# Patient Record
Sex: FEMALE | Race: WHITE | NOT HISPANIC OR LATINO | Employment: OTHER | URBAN - NONMETROPOLITAN AREA
[De-identification: names, ages, dates, MRNs, and addresses within clinical notes are randomized per-mention and may not be internally consistent; named-entity substitution may affect disease eponyms.]

---

## 2018-01-08 ENCOUNTER — OFFICE VISIT CONVERTED (OUTPATIENT)
Dept: FAMILY MEDICINE CLINIC | Age: 78
End: 2018-01-08
Attending: NURSE PRACTITIONER

## 2018-02-21 ENCOUNTER — OFFICE VISIT CONVERTED (OUTPATIENT)
Dept: FAMILY MEDICINE CLINIC | Age: 78
End: 2018-02-21
Attending: FAMILY MEDICINE

## 2018-04-06 ENCOUNTER — OFFICE VISIT CONVERTED (OUTPATIENT)
Dept: FAMILY MEDICINE CLINIC | Age: 78
End: 2018-04-06
Attending: FAMILY MEDICINE

## 2018-07-17 ENCOUNTER — OFFICE VISIT CONVERTED (OUTPATIENT)
Dept: FAMILY MEDICINE CLINIC | Age: 78
End: 2018-07-17
Attending: FAMILY MEDICINE

## 2018-12-11 ENCOUNTER — OFFICE VISIT CONVERTED (OUTPATIENT)
Dept: FAMILY MEDICINE CLINIC | Age: 78
End: 2018-12-11
Attending: FAMILY MEDICINE

## 2019-01-09 ENCOUNTER — OFFICE VISIT CONVERTED (OUTPATIENT)
Dept: FAMILY MEDICINE CLINIC | Age: 79
End: 2019-01-09
Attending: FAMILY MEDICINE

## 2019-05-28 ENCOUNTER — OFFICE VISIT CONVERTED (OUTPATIENT)
Dept: FAMILY MEDICINE CLINIC | Age: 79
End: 2019-05-28
Attending: FAMILY MEDICINE

## 2019-08-28 ENCOUNTER — HOSPITAL ENCOUNTER (OUTPATIENT)
Dept: OTHER | Facility: HOSPITAL | Age: 79
Discharge: HOME | End: 2019-08-28
Attending: FAMILY MEDICINE

## 2019-08-28 ENCOUNTER — OFFICE VISIT CONVERTED (OUTPATIENT)
Dept: FAMILY MEDICINE CLINIC | Age: 79
End: 2019-08-28
Attending: FAMILY MEDICINE

## 2019-08-28 LAB
ALBUMIN SERPL-MCNC: 4.4 G/DL (ref 3.5–5)
ALBUMIN/GLOB SERPL: 1.8 {RATIO} (ref 1.4–2.6)
ALP SERPL-CCNC: 79 U/L (ref 43–160)
ALT SERPL-CCNC: 10 U/L (ref 10–40)
ANION GAP SERPL CALC-SCNC: 17 MMOL/L (ref 8–19)
AST SERPL-CCNC: 17 U/L (ref 15–50)
BASOPHILS # BLD AUTO: 0.05 10*3/UL (ref 0–0.2)
BASOPHILS NFR BLD AUTO: 0.8 % (ref 0–3)
BILIRUB SERPL-MCNC: 0.41 MG/DL (ref 0.2–1.3)
BUN SERPL-MCNC: 17 MG/DL (ref 5–25)
BUN/CREAT SERPL: 20 {RATIO} (ref 6–20)
CALCIUM SERPL-MCNC: 9.2 MG/DL (ref 8.7–10.4)
CHLORIDE SERPL-SCNC: 103 MMOL/L (ref 99–111)
CHOLEST SERPL-MCNC: 240 MG/DL (ref 107–200)
CHOLEST/HDLC SERPL: 3.9 {RATIO} (ref 3–6)
CONV ABS IMM GRAN: 0.01 10*3/UL (ref 0–0.2)
CONV CO2: 24 MMOL/L (ref 22–32)
CONV IMMATURE GRAN: 0.2 % (ref 0–1.8)
CONV TOTAL PROTEIN: 6.9 G/DL (ref 6.3–8.2)
CREAT UR-MCNC: 0.86 MG/DL (ref 0.5–0.9)
DEPRECATED RDW RBC AUTO: 43.4 FL (ref 36.4–46.3)
EOSINOPHIL # BLD AUTO: 0.07 10*3/UL (ref 0–0.7)
EOSINOPHIL # BLD AUTO: 1.2 % (ref 0–7)
ERYTHROCYTE [DISTWIDTH] IN BLOOD BY AUTOMATED COUNT: 12.8 % (ref 11.7–14.4)
GFR SERPLBLD BASED ON 1.73 SQ M-ARVRAT: >60 ML/MIN/{1.73_M2}
GLOBULIN UR ELPH-MCNC: 2.5 G/DL (ref 2–3.5)
GLUCOSE SERPL-MCNC: 93 MG/DL (ref 65–99)
HCT VFR BLD AUTO: 37.9 % (ref 37–47)
HDLC SERPL-MCNC: 61 MG/DL (ref 40–60)
HGB BLD-MCNC: 12.7 G/DL (ref 12–16)
LDLC SERPL CALC-MCNC: 140 MG/DL (ref 70–100)
LYMPHOCYTES # BLD AUTO: 2.04 10*3/UL (ref 1–5)
LYMPHOCYTES NFR BLD AUTO: 34.3 % (ref 20–45)
MCH RBC QN AUTO: 30.9 PG (ref 27–31)
MCHC RBC AUTO-ENTMCNC: 33.5 G/DL (ref 33–37)
MCV RBC AUTO: 92.2 FL (ref 81–99)
MONOCYTES # BLD AUTO: 0.53 10*3/UL (ref 0.2–1.2)
MONOCYTES NFR BLD AUTO: 8.9 % (ref 3–10)
NEUTROPHILS # BLD AUTO: 3.25 10*3/UL (ref 2–8)
NEUTROPHILS NFR BLD AUTO: 54.6 % (ref 30–85)
NRBC CBCN: 0 % (ref 0–0.7)
OSMOLALITY SERPL CALC.SUM OF ELEC: 291 MOSM/KG (ref 273–304)
PLATELET # BLD AUTO: 231 10*3/UL (ref 130–400)
PMV BLD AUTO: 10.6 FL (ref 9.4–12.3)
POTASSIUM SERPL-SCNC: 4.2 MMOL/L (ref 3.5–5.3)
RBC # BLD AUTO: 4.11 10*6/UL (ref 4.2–5.4)
SODIUM SERPL-SCNC: 140 MMOL/L (ref 135–147)
TRIGL SERPL-MCNC: 196 MG/DL (ref 40–150)
TSH SERPL-ACNC: 0.6 M[IU]/L (ref 0.27–4.2)
VLDLC SERPL-MCNC: 39 MG/DL (ref 5–37)
WBC # BLD AUTO: 5.95 10*3/UL (ref 4.8–10.8)

## 2019-09-30 ENCOUNTER — OFFICE VISIT CONVERTED (OUTPATIENT)
Dept: FAMILY MEDICINE CLINIC | Age: 79
End: 2019-09-30
Attending: FAMILY MEDICINE

## 2019-12-04 ENCOUNTER — HOSPITAL ENCOUNTER (OUTPATIENT)
Dept: OTHER | Facility: HOSPITAL | Age: 79
Discharge: HOME | End: 2019-12-04
Attending: FAMILY MEDICINE

## 2019-12-04 LAB
CHOLEST SERPL-MCNC: 189 MG/DL (ref 107–200)
CHOLEST/HDLC SERPL: 2.9 {RATIO} (ref 3–6)
HDLC SERPL-MCNC: 65 MG/DL (ref 40–60)
LDLC SERPL CALC-MCNC: 96 MG/DL (ref 70–100)
TRIGL SERPL-MCNC: 138 MG/DL (ref 40–150)
VLDLC SERPL-MCNC: 28 MG/DL (ref 5–37)

## 2019-12-18 ENCOUNTER — OFFICE VISIT CONVERTED (OUTPATIENT)
Dept: FAMILY MEDICINE CLINIC | Age: 79
End: 2019-12-18
Attending: FAMILY MEDICINE

## 2020-02-08 ENCOUNTER — OFFICE VISIT CONVERTED (OUTPATIENT)
Dept: FAMILY MEDICINE CLINIC | Age: 80
End: 2020-02-08
Attending: FAMILY MEDICINE

## 2020-02-08 ENCOUNTER — HOSPITAL ENCOUNTER (OUTPATIENT)
Dept: OTHER | Facility: HOSPITAL | Age: 80
Discharge: HOME | End: 2020-02-08
Attending: FAMILY MEDICINE

## 2020-02-08 LAB
APPEARANCE UR: ABNORMAL
BACTERIA UR CULT: NORMAL
BACTERIA UR QL AUTO: ABNORMAL
BILIRUB UR QL: NEGATIVE
CASTS URNS QL MICRO: ABNORMAL /[LPF]
COLOR UR: ABNORMAL
CONV LEUKOCYTE ESTERASE: ABNORMAL
CONV UROBILINOGEN IN URINE BY AUTOMATED TEST STRIP: 0.2 {EHRLICHU}/DL (ref 0.1–1)
EPI CELLS #/AREA URNS HPF: ABNORMAL /[HPF]
GLUCOSE 24H UR-MCNC: NEGATIVE MG/DL
HGB UR QL STRIP: ABNORMAL
KETONES UR QL STRIP: NEGATIVE MG/DL
MUCOUS THREADS URNS QL MICRO: ABNORMAL
NITRITE UR-MCNC: POSITIVE MG/ML
PH UR STRIP.AUTO: 7 [PH] (ref 5–8)
PROT UR-MCNC: NEGATIVE MG/DL
RBC # BLD AUTO: ABNORMAL /[HPF]
SP GR UR STRIP: 1.02 (ref 1–1.03)
SPECIMEN SOURCE: ABNORMAL
UNIDENT CRYS URNS QL MICRO: ABNORMAL /[HPF]
WBC #/AREA URNS HPF: ABNORMAL /[HPF]

## 2020-02-10 LAB
AMOXICILLIN+CLAV SUSC ISLT: <=2
AMPICILLIN SUSC ISLT: 16
AMPICILLIN+SULBAC SUSC ISLT: 8
BACTERIA UR CULT: ABNORMAL
CEFAZOLIN SUSC ISLT: <=4
CEFEPIME SUSC ISLT: <=1
CEFTAZIDIME SUSC ISLT: <=1
CEFTRIAXONE SUSC ISLT: <=1
CEFUROXIME ORAL SUSC ISLT: 2
CEFUROXIME PARENTER SUSC ISLT: 2
CIPROFLOXACIN SUSC ISLT: <=0.25
ERTAPENEM SUSC ISLT: <=0.5
GENTAMICIN SUSC ISLT: <=1
LEVOFLOXACIN SUSC ISLT: <=0.12
NITROFURANTOIN SUSC ISLT: <=16
TETRACYCLINE SUSC ISLT: <=1
TMP SMX SUSC ISLT: <=20
TOBRAMYCIN SUSC ISLT: <=1

## 2020-02-19 ENCOUNTER — OFFICE VISIT CONVERTED (OUTPATIENT)
Dept: FAMILY MEDICINE CLINIC | Age: 80
End: 2020-02-19
Attending: FAMILY MEDICINE

## 2020-03-27 ENCOUNTER — OFFICE VISIT CONVERTED (OUTPATIENT)
Dept: FAMILY MEDICINE CLINIC | Age: 80
End: 2020-03-27
Attending: FAMILY MEDICINE

## 2020-06-26 ENCOUNTER — OFFICE VISIT CONVERTED (OUTPATIENT)
Dept: FAMILY MEDICINE CLINIC | Age: 80
End: 2020-06-26
Attending: FAMILY MEDICINE

## 2020-09-21 ENCOUNTER — CONVERSION ENCOUNTER (OUTPATIENT)
Dept: FAMILY MEDICINE CLINIC | Age: 80
End: 2020-09-21

## 2020-09-21 ENCOUNTER — HOSPITAL ENCOUNTER (OUTPATIENT)
Dept: OTHER | Facility: HOSPITAL | Age: 80
Discharge: HOME | End: 2020-09-21
Attending: FAMILY MEDICINE

## 2020-09-22 LAB — SARS-COV-2 RNA SPEC QL NAA+PROBE: NOT DETECTED

## 2020-09-24 ENCOUNTER — OFFICE VISIT CONVERTED (OUTPATIENT)
Dept: FAMILY MEDICINE CLINIC | Age: 80
End: 2020-09-24
Attending: FAMILY MEDICINE

## 2020-12-16 ENCOUNTER — OFFICE VISIT CONVERTED (OUTPATIENT)
Dept: FAMILY MEDICINE CLINIC | Age: 80
End: 2020-12-16
Attending: FAMILY MEDICINE

## 2021-01-26 ENCOUNTER — HOSPITAL ENCOUNTER (OUTPATIENT)
Dept: OTHER | Facility: HOSPITAL | Age: 81
Discharge: HOME | End: 2021-01-26
Attending: INTERNAL MEDICINE

## 2021-02-23 ENCOUNTER — HOSPITAL ENCOUNTER (OUTPATIENT)
Dept: VACCINE CLINIC | Facility: HOSPITAL | Age: 81
Discharge: HOME | End: 2021-02-23
Attending: INTERNAL MEDICINE

## 2021-04-01 ENCOUNTER — OFFICE VISIT CONVERTED (OUTPATIENT)
Dept: FAMILY MEDICINE CLINIC | Age: 81
End: 2021-04-01
Attending: FAMILY MEDICINE

## 2021-05-18 NOTE — PROGRESS NOTES
Paige Cantu 1940     Office/Outpatient Visit    Visit Date:  03:38 pm    Provider: Reza Dougherty MD (Assistant: Maddie Bucio MA)    Location: [facility]        Electronically signed by Reza Dougherty MD on  2018 05:53:15 PM                             SUBJECTIVE:        CC:     Ms. Cantu is a 77 year old White female.  Workmans Comp;         HPI:         Chronic intermittent low back pain noted.  The discomfort is most prominent in the lumbar spine.  This radiates to the left buttock,  left posterior thigh, and left calf.  She characterizes it as intermittent, moderate in intensity, burning, and WAXES AND WANES.  She states that the current episode of pain started years ago.  The event which precipitated this pain was lifting.  This occurred at work.  Associated symptoms include numbness in the legs and LEGS CRAMP, MOSTLY AT NIGHT.  She denies weakness of the legs.  She notes some pain relief with narcotic pain medication, NSAIDs, muscle relaxants, USES NARCOTICS SAPARINGLY, and LIDODERM.  Other details: X-RAYS SHOWED SOME DJD.  HAS NOT HAD AN MRI FOR SEVERAL YEARS..      ROS:         MUSCULOSKELETAL:  Positive for back pain ( recurrent ).      NEUROLOGICAL:  Positive for paresthesia ( left lower extremity ).          PMH/FMH/SH:     Last Reviewed on 2018 03:55 PM by Reza Dougherty    Past Medical History:                 PAST MEDICAL HISTORY             GYNECOLOGICAL HISTORY:                 ADVANCE DIRECTIVES: Living will         PREVENTIVE HEALTH MAINTENANCE             BONE DENSITY: was last done 2018 with normal results     COLORECTAL CANCER SCREENING: Up to date (colonoscopy q10y; sigmoidoscopy q5y; Cologuard q3y) was last done 08, Results are in chart; colonoscopy with normal results     MAMMOGRAM: Done within last 2 years and results in are chart was last done 2018 normal         Surgical History:         Hysterectomy       BSO;         Family History:     Unremarkable         Social History:         Marital Status:      Children: 2 children         Tobacco/Alcohol/Supplements:     Last Reviewed on 7/17/2018 03:54 PM by Reza Dougherty    Tobacco: She has never smoked.      Caffeine:  She admits to consuming caffeine via coffee ( 3 servings per day ) and tea ( 1 serving per day ).          Substance Abuse History:     Last Reviewed on 7/17/2018 03:54 PM by Reza Dougherty    NEGATIVE             Current Problems:     Last Reviewed on 7/17/2018 03:57 PM by Reza Dougherty    Chronic intermittent low back pain     Allergies     Restless legs syndrome (RLS)     HTN     High cholesterol     Varicose veins of lower extremities, with Pain         Immunizations:     Td adult 10/7/2010     zzPrevnar-13 8/11/2015     Fluzone (3 + years dose) 10/24/2011     Fluzone (3 + years dose) 10/26/2012     Fluzone High-Dose pf (>=65 yr) 11/19/2015     Fluzone High-Dose pf (>=65 yr) 3/14/2017     Fluzone High-Dose pf (>=65 yr) 1/29/2018     PNEUMOVAX 23 (Pneumococcal PPV23) 0/0/2006         Allergies:     Last Reviewed on 7/17/2018 03:54 PM by Reza Dougherty      No Known Drug Allergies.         Current Medications:     Last Reviewed on 7/17/2018 03:56 PM by Reza Dougherty    Hydrocodone/Acetaminophen 5mg/325mg Tablet 1 PO Qdaily PRN for pain     Lisinopril 10mg Tablet 1 tab daily     Meloxicam 15mg Tablet 1 tab daily     Hydrochlorothiazide (HCTZ) 25mg Tablet one a day     Baclofen 20mg Tablet one po Qhs     Lidoderm 5% Adhesive Patch 1 patch q 12 hours on and 12 hours off     Pravastatin 80mg Tablet Take 1 tablet(s) by mouth at bedtime     Fenugreek     abad 500mg 1 tab daily     cranberrry pills 800mg DAILY     Vitamin D3 1,000IU Tablet Take 1 tablet(s) by mouth daily     Vitamin B12     Vitamin B6     fish oil 1000mg qday         OBJECTIVE:        Vitals:         Current: 7/17/2018 3:42:17 PM    Ht:  5  ft, 2.25 in;  Wt: 135.9 lbs;  BMI: 24.7    T: 97.3 F (oral);  BP: 133/61 mm Hg (left arm, sitting);  P: 70 bpm (left arm (BP Cuff), sitting);  sCr: 0.99 mg/dL;  GFR: 44.79        Exams:     PHYSICAL EXAM:     GENERAL: vital signs recorded - well developed, well nourished;  no apparent distress;     NEUROLOGIC: GROSSLY INTACT     PSYCHIATRIC:  appropriate affect and demeanor; normal speech pattern; grossly normal memory; LOW BACK examination: Inspection: normal;     Palpation: lumbar tenderness;     Neuro-vascular: normal;     Muscular Strength: normal;     Range of Motion: LAROM;     Maneuvers: (+) left straight leg raise.              ASSESSMENT           724.2   M54.17  Chronic intermittent low back pain              DDx:         ORDERS:         Meds Prescribed:       Refill of: Hydrocodone/Acetaminophen 5mg/325mg Tablet 1 PO Qdaily PRN for pain  #30 (Thirty) tablet(s) Refills: 0                 PLAN:          Chronic intermittent low back pain         MEDICATIONS: (no change to current medication regimen)     FOLLOW-UP: Schedule a follow-up visit in 6 months.      CONSIDER REPEAT P.T. EVAL           Prescriptions:       Refill of: Hydrocodone/Acetaminophen 5mg/325mg Tablet 1 PO Qdaily PRN for pain  #30 (Thirty) tablet(s) Refills: 0             Patient Recommendations:        For  Chronic intermittent low back pain:     Schedule a follow-up visit in 6 months.              CHARGE CAPTURE           **Please note: ICD descriptions below are intended for billing purposes only and may not represent clinical diagnoses**        Primary Diagnosis:         724.2 Chronic intermittent low back pain            M54.17    Radiculopathy, lumbosacral region              Orders:          96202   Office/outpatient visit; established patient, level 3  (In-House)

## 2021-05-18 NOTE — PROGRESS NOTES
Paige Cantu  1940     Office/Outpatient Visit    Visit Date:  03:43 pm    Provider: Reza Dougherty MD (Assistant: Gale Carias LPN)    Location: [facility]        Electronically signed by Reza Dougherty MD on  2021 09:48:53 PM                             Subjective:        CC: Ms. Cantu is a 80 year old White female.  This is a follow-up visit.          HPI:           Patient to be evaluated for radiculopathy, lumbosacral region.  The discomfort is most prominent in the lumbar spine.  This radiates to the left buttock,  left posterior thigh, and left calf.  She characterizes it as intermittent, moderate in intensity, burning, and WAXES AND WANES.  She states that the current episode of pain started years ago.  The event which precipitated this pain was lifting.  This occurred at work.  Associated symptoms include numbness in the legs and LEGS CRAMP, MOSTLY AT NIGHT.  She denies weakness of the legs.  She notes some pain relief with narcotic pain medication, NSAIDs, muscle relaxants, and LIDODERM.  Other details: X-RAYS SHOWED SOME DJD.  HAS NOT HAD AN MRI FOR SEVERAL YEARS..      ROS:     CONSTITUTIONAL:  Negative for chills, fatigue and fever.      E/N/T:  Negative for ear pain, diminished hearing, frequent rhinorrhea and sore throat.      CARDIOVASCULAR:  Negative for chest pain, palpitations and pedal edema.      RESPIRATORY:  Negative for recent cough and dyspnea.      GASTROINTESTINAL:  Negative for abdominal pain, anorexia, nausea and vomiting.      MUSCULOSKELETAL:  Negative for myalgias.      NEUROLOGICAL:  Negative for headaches, paresthesias and RECENT FALLING.          Past Medical History / Family History / Social History:         Last Reviewed on 2021 03:53 PM by Reza Dougherty    Past Medical History:                 PAST MEDICAL HISTORY             GYNECOLOGICAL HISTORY:             CURRENT MEDICAL PROVIDERS:    Oncologist              ADVANCE DIRECTIVES: Living will         PREVENTIVE HEALTH MAINTENANCE             BONE DENSITY: was last done 06- with normal results     COLORECTAL CANCER SCREENING: Up to date (colonoscopy q10y; sigmoidoscopy q5y; Cologuard q3y) was last done 2019, Results are in chart; colonoscopy with normal results     DENTAL CLEANING: has full dentures     EYE EXAM: was last done 2020     Hepatitis C Medicare Screening: was last done 2018     MAMMOGRAM: Done within last 2 years and results in are chart was last done 10/12/20 which was abnormal S/P LEFT LUMECTOMY     PAP SMEAR: No longer indicated due to age and history         Surgical History:         Hysterectomy     BSO;    LUMPECTOMY LEFT BREAST;;;         Family History:     Unremarkable         Social History:         Marital Status:      Children: 2 children         Tobacco/Alcohol/Supplements:     Last Reviewed on 4/01/2021 03:53 PM by Reza Dougherty    Tobacco: She has never smoked.      Caffeine:  She admits to consuming caffeine via coffee ( 3 servings per day ) and tea ( 1 serving per day ).          Substance Abuse History:     Last Reviewed on 4/01/2021 03:53 PM by Reza Dougherty    NEGATIVE         Current Problems:     Last Reviewed on 4/01/2021 03:53 PM by Reza Dougherty    Pure hypercholesterolemia    Essential (primary) hypertension    Varicose veins of lower extremities, with Pain    Radiculopathy, lumbosacral region    Allergies    Other long term (current) drug therapy    Personal history of malignant neoplasm of breast        Immunizations:     influenza, high-dose, quadrivalent 9/30/2020    Td adult 10/7/2010    zzPrevnar-13 8/11/2015    Fluzone (3 + years dose) 10/24/2011    Fluzone (3 + years dose) 10/26/2012    Fluzone High-Dose pf (>=65 yr) 11/19/2015    Fluzone High-Dose pf (>=65 yr) 3/14/2017    Fluzone High-Dose pf (>=65 yr) 1/29/2018    Fluzone High-Dose pf (>=65 yr) 12/28/2018    Fluzone  High-Dose pf (>=65 yr) 11/4/2019    PNEUMOVAX 23 (Pneumococcal PPV23) 0/0/2006        Allergies:     Last Reviewed on 4/01/2021 03:53 PM by Reza Dougherty    No Known Allergies.        Current Medications:     Last Reviewed on 4/01/2021 03:53 PM by Reza Dougherty    zinc 50 mg oral tablet [1 daily]    anastrozole 1 mg oral tablet [take 1 tablet (1 mg) by oral route once daily]    hydroCHLOROthiazide 25 mg oral tablet [one a day]    Vitamin B6     Vitamin B12     cranberrry pills 800mg DAILY     Vitamin D3 25 mcg (1,000 unit) oral tablet [Take 1 tablet(s) by mouth daily]    Pravastatin 80mg Tablet [Take 1 tablet(s) by mouth at bedtime]    abad 500mg 1 tab daily      Lidoderm 5% Adhesive Patch [1 patch q 12 hours on and 12 hours off]    Meloxicam 15mg Tablet [1 tab daily]    Baclofen 20mg Tablet [one po Qhs]    lisinopriL 10 mg oral tablet [TAKE 1 TABLET BY MOUTH EVERY DAY]    Fenugreek     HYDROcodone-acetaminophen 5-325 mg oral tablet [1 PO Qdaily PRN for pain]        Objective:        Vitals:         Current: 4/1/2021 3:49:34 PM    Ht:  5 ft, 2.25 in;  Wt: 145.6 lbs;  BMI: 26.4T: 96.7 F (temporal);  BP: 148/50 mm Hg (right arm, sitting);  P: 72 bpm (right arm (BP Cuff), sitting);  sCr: 0.86 mg/dL;  GFR: 50.67        Exams:     PHYSICAL EXAM:     GENERAL: vital signs recorded - well developed, well nourished;  no apparent distress;     RESPIRATORY: normal respiratory rate and pattern with no distress; normal breath sounds with no rales, rhonchi, wheezes or rubs;     CARDIOVASCULAR: normal rate; rhythm is regular;     NEUROLOGIC: GROSSLY INTACT     PSYCHIATRIC:  appropriate affect and demeanor; normal speech pattern; grossly normal memory; LOW BACK examination: Inspection: normal;     Palpation: lumbar tenderness;     Neuro-vascular: normal;     Muscular Strength: normal;     Range of Motion: LAROM;     Maneuvers: (+) left straight leg raise.              Lab/Test Results:         Urine temperature:  confirmed (04/01/2021),     All urine drug screen levels confirmed negative: yes (04/01/2021),     Date and time of last pill: hydrocodone 3/29/21 (04/01/2021),     Performed by: raji (04/01/2021),     Collection Time: 1623 (04/01/2021),             Assessment:         M54.17   Radiculopathy, lumbosacral region       Z79.899   Other long term (current) drug therapy           ORDERS:         Radiology/Test Orders:       3017F  Colorectal CA screen results documented and reviewed (PV)  (In-House)              Lab Orders:       [id number]  Drug test prsmv qual dir optical obs per day  (In-House)              Other Orders:         Screening mammogram results documented  (Send-Out)                      Plan:         Radiculopathy, lumbosacral region        MEDICATIONS: (no change to current medication regimen)     FOLLOW-UP: Schedule a follow-up visit in 3 months.      CONSIDER REPEAT P.T. EVAL MIPS Screening mammomgram done within last 2 years and results in are chart Colorectal Cancer Screening is up to date and the results are in the chart Controlled substance documentation: Sidney reviewed; drug screen performed and appropriate; consent is reviewed and signed and on the chart.  She is aware of risk of addiction on this medication, understands that she will need to follow up for a review every 3 months and her medications will be adjusted or decreased as deemed appropriate at each visit.  No history of drug or alcohol abuse.  No concerns about diversion or abuse. She denies side effects related to the medication.  She is aware that she may be called in for pill counts.  The dosing of this medication will be reviewed on a regular basis and reduced if possible..  Ongoing use of a controlled substance is necessary for this patient to have a normal quality of life           Orders:         Screening mammogram results documented  (Send-Out)            3017F  Colorectal CA screen results documented and reviewed (PV)   (In-House)              Other long term (current) drug therapy    LABORATORY:  Labs ordered to be performed today include Drug screen.            Orders:       [id number]  Drug test prsmv qual dir optical obs per day  (In-House)                  Patient Recommendations:        For  Radiculopathy, lumbosacral region:    Schedule a follow-up visit in 3 months.              Charge Capture:         Primary Diagnosis:     M54.17  Radiculopathy, lumbosacral region           Orders:      [id number]  Office/outpatient visit; established patient, level 3  (In-House)            3017F  Colorectal CA screen results documented and reviewed (PV)  (In-House)              Z79.899  Other long term (current) drug therapy           Orders:      [id number]  Drug test prsmv qual dir optical obs per day  (In-House)

## 2021-05-18 NOTE — PROGRESS NOTES
Paige Cantu  1940     Office/Outpatient Visit    Visit Date: Wed, Dec 16, 2020 02:46 pm    Provider: Reza Dougherty MD (Assistant: Karla Abdul,  )    Location: [facility]        Electronically signed by Reza Dougherty MD on  12/16/2020 06:24:21 PM                             Subjective:        CC: Ms. Cantu is a 79 year old White female.  MCW and update on breast cancer treatment;         HPI:           Ms. Cantu is here for a Medicare wellness visit.  The required HRA questions are integrated within this visit note. Family medical history and individual medical/surgical history were reviewed and updated.  A current height, weight, BMI, blood pressure, and pulse were recorded in the vitals section of the note and have been reviewed. Patient's medications, including supplements, were recorded in the chart and reviewed.  Current providers and suppliers were reviewed and updated.          Self-Assessment of Health: She rates her health as very good. She rates her confidence of being able to control/manage most of her health problems as very confident. Her physical/emotional health has limited her social activites not at all.  A review of cognitive impairment was performed, including ability to drive a car, manage finances, and any memory changes, and was found to be negative.  A review of functional ability, including bathing, dressing, walking, and urine/bowel continence as well as level of safety was performed and was found to be negative.  Falls Risk: Has not had any falls or only one fall without injury in the past year.  She denies having trouble hearing the TV/radio when others do not, having to strain to hear or understand conversations and wearing hearing aid(s).  Concerning home safety, she reports that at home she DOES have adequate lighting, a skid resistant shower/tub, grab bars in the bath, handrails on stairs, functioning smoke alarms and absence of throw rugs.           Immunization Status: Up to date; Physical Activity: She exercises for at least 20 minutes 3 or more days/week.; Type of diet patient normally eats is described as well-balanced with fruits and vegetables Tobacco: She has never smoked.  Preventative Health updated today           PHQ-9 Depression Screening: Completed form scanned and in chart; Total Score 0     ROS:     CONSTITUTIONAL:  Negative for chills and fever.      EYES:  Negative for blurred vision and eye drainage.      E/N/T:  Negative for ear pain, diminished hearing, frequent rhinorrhea and sore throat.      CARDIOVASCULAR:  Negative for chest pain, palpitations and pedal edema.      RESPIRATORY:  Negative for recent cough and dyspnea.      GASTROINTESTINAL:  Negative for abdominal pain, anorexia, constipation, diarrhea, nausea and vomiting.      GENITOURINARY:  Negative for dysuria and hematuria.      MUSCULOSKELETAL:  Positive for back pain ( chronic; INTERMITTENT ).      INTEGUMENTARY/BREAST:  Negative for breast mass and breast tenderness.      NEUROLOGICAL:  Negative for headaches and paresthesias.          Past Medical History / Family History / Social History:         Last Reviewed on 2020 03:01 PM by Reza Dougherty    Past Medical History:                 PAST MEDICAL HISTORY             GYNECOLOGICAL HISTORY:             CURRENT MEDICAL PROVIDERS:    Oncologist             ADVANCE DIRECTIVES: Living will         PREVENTIVE HEALTH MAINTENANCE             BONE DENSITY: was last done 2018 with normal results     COLORECTAL CANCER SCREENING: Up to date (colonoscopy q10y; sigmoidoscopy q5y; Cologuard q3y) was last done , Results are in chart; colonoscopy with normal results     DENTAL CLEANING: has full dentures     EYE EXAM: was last done      Hepatitis C Medicare Screening: was last done      MAMMOGRAM: Done within last 2 years and results in are chart was last done 10/12/20 which was abnormal S/P LEFT  LUMECTOMY     PAP SMEAR: No longer indicated due to age and history         Surgical History:         Hysterectomy     BSO;    LUMPECTOMY LEFT BREAST;;;         Family History:     Unremarkable         Social History:         Marital Status:      Children: 2 children         Tobacco/Alcohol/Supplements:     Last Reviewed on 12/16/2020 03:01 PM by Reza Dougherty    Tobacco: She has never smoked.      Caffeine:  She admits to consuming caffeine via coffee ( 3 servings per day ) and tea ( 1 serving per day ).          Substance Abuse History:     Last Reviewed on 12/16/2020 03:01 PM by Reza Dougherty    NEGATIVE         Current Problems:     Last Reviewed on 12/16/2020 03:01 PM by Reza Dougherty    Pure hypercholesterolemia    Essential (primary) hypertension    Varicose veins of lower extremities, with Pain    Allergies    Radiculopathy, lumbosacral region    Other long term (current) drug therapy    Encounter for screening for depression    Personal history of malignant neoplasm of breast    Encounter for general adult medical examination without abnormal findings        Immunizations:     Td adult 10/7/2010    zzPrevnar-13 8/11/2015    Fluzone (3 + years dose) 10/24/2011    Fluzone (3 + years dose) 10/26/2012    Fluzone High-Dose pf (>=65 yr) 11/19/2015    Fluzone High-Dose pf (>=65 yr) 3/14/2017    Fluzone High-Dose pf (>=65 yr) 1/29/2018    Fluzone High-Dose pf (>=65 yr) 12/28/2018    Fluzone High-Dose pf (>=65 yr) 11/4/2019    PNEUMOVAX 23 (Pneumococcal PPV23) 0/0/2006    influenza, high-dose, quadrivalent 9/30/2020        Allergies:     Last Reviewed on 12/16/2020 03:01 PM by Reza Dougherty    No Known Allergies.        Current Medications:     Last Reviewed on 12/16/2020 03:03 PM by Karla Abdul    hydroCHLOROthiazide 25 mg oral tablet [one a day]    Vitamin B6     Vitamin B12     cranberrry pills 800mg DAILY     Vitamin D3 25 mcg (1,000 unit) oral tablet [Take 1  tablet(s) by mouth daily]    Pravastatin 80mg Tablet [Take 1 tablet(s) by mouth at bedtime]    abad 500mg 1 tab daily      Lidoderm 5% Adhesive Patch [1 patch q 12 hours on and 12 hours off]    Meloxicam 15mg Tablet [1 tab daily]    Baclofen 20mg Tablet [one po Qhs]    lisinopriL 10 mg oral tablet [TAKE 1 TABLET BY MOUTH DAILY]    Fenugreek     HYDROcodone-acetaminophen 5-325 mg oral tablet [1 PO Qdaily PRN for pain]    zinc 50 mg oral tablet [1 daily]        Objective:        Vitals:         Current: 12/16/2020 3:05:59 PM    Ht:  5 ft, 2.25 in;  Wt: 145.8 lbs;  BMI: 26.5T: 97.4 F (temporal);  BP: 147/67 mm Hg (left arm, sitting);  P: 74 bpm (left arm (BP Cuff), sitting);  sCr: 0.86 mg/dL;  GFR: 51.51VA: 20/50 OD, 20/50 OS (near, without correction)        Exams:     PHYSICAL EXAM:     GENERAL: vital signs recorded - well developed, well nourished;  no apparent distress;     EYES: conjunctiva and cornea are normal;     E/N/T:  normal EACs, TMs, nasal/oral mucosa, teeth, gingiva, and oropharynx;     NECK: trachea is midline; thyroid is non-palpable; carotid exam reveals no bruits;     RESPIRATORY: normal respiratory rate and pattern with no distress; normal breath sounds with no rales, rhonchi, wheezes or rubs;     CARDIOVASCULAR: normal rate; rhythm is regular;  no systolic murmur; no edema;     GASTROINTESTINAL: nontender;     LYMPHATIC: no enlargement of cervical or facial nodes; no supraclavicular nodes;     MUSCULOSKELETAL: normal gait; normal overall tone     NEUROLOGIC: GROSSLY INTACT     PSYCHIATRIC:  appropriate affect and demeanor; normal speech pattern; grossly normal memory;         Assessment:         Z00.00   Encounter for general adult medical examination without abnormal findings       Z13.31   Encounter for screening for depression           ORDERS:         Radiology/Test Orders:       3017F  Colorectal CA screen results documented and reviewed (PV)  (In-House)              Procedures Ordered:          Annual wellness visit, includes a PPPS, subsequent visit  (In-House)              Other Orders:         Depression screen negative  (In-House)            1101F  Pt screen for fall risk; document no falls in past year or only 1 fall w/o injury in past year (JUANA)  (In-House)              Screening mammogram results documented  (Send-Out)                      Plan:         Encounter for general adult medical examination without abnormal findings    MIPS Negative Depression Screen ADVANCE DIRECTIVES (Please update in PMH): Living will home     COUNSELING provided on: fall prevention, healthy eating habits, Medicare Preventative Services Guide given to patient., regular exercise, use of seat belts, and ADVISED TO SEE AN EYE DOCTOR AND DENTIST REGULARLY.      FOLLOW-UP: Schedule a follow-up visit in 3 months.            Orders:         Depression screen negative  (In-House)            1101F  Pt screen for fall risk; document no falls in past year or only 1 fall w/o injury in past year (JUANA)  (In-House)              Screening mammogram results documented  (Send-Out)            3017F  Colorectal CA screen results documented and reviewed (PV)  (In-House)              Annual wellness visit, includes a PPPS, subsequent visit  (In-House)                  Patient Recommendations:        For  Encounter for general adult medical examination without abnormal findings:    I also recommend home.      Regularly exercise within recommended guidelines, especially to maintain balance. Remove obstacles in walkways at home.  Use non-skid material for bathtub safety.  Remove loose throw rugs from floor. Use nightlights in bedrooms, hallways, and bathrooms.    Limit dietary intake of fat (especially saturated fat) and cholesterol.  Eat a variety of foods, including plenty of fruits, vegetables, and grain containg fiber, limit fat intake to 30% of total calories. Balance caloric intake with energy expended.     Maintaining regular physical activity is advised to help prevent heart disease, hypertension, diabetes, and obesity.    Always use shoulder/lap restraints when driving or riding in a vehicle, even those equipped with air bags.  Schedule a follow-up visit in 3 months.              Charge Capture:         Primary Diagnosis:     Z00.00  Encounter for general adult medical examination without abnormal findings           Orders:      08944  Preventive medicine, established patient, age 65+ years  (In-House)              Depression screen negative  (In-House)            1101F  Pt screen for fall risk; document no falls in past year or only 1 fall w/o injury in past year (JUANA)  (In-House)            3017F  Colorectal CA screen results documented and reviewed (PV)  (In-House)              Annual wellness visit, includes a PPPS, subsequent visit  (In-House)              Z13.31  Encounter for screening for depression

## 2021-05-18 NOTE — PROGRESS NOTES
Paige Cantu  1940     Office/Outpatient Visit    Visit Date:  02:15 pm    Provider: Reza Dougherty MD (Assistant: Nicolasa Bailey MA)    Location: [facility]        Electronically signed by Reza Dougherty MD on  2020 04:58:15 PM                             Subjective:        CC: Ms. Cantu is a 79 year old White female.  This is a follow-up visit.  3 month check up;         HPI:           Patient presents with pure hypercholesterolemia.  Current treatment includes a lipid lowering agent.  Compliance with treatment has been good.  Most recent lab tests include Total Cholesterol:  189 (mg/dL) (2019), HDL:  65 (mg/dL) (2019), Triglycerides:  138 (mg/dL) (2019), LDL:  96 (mg/dL) (2019).            Essential (primary) hypertension details; her current cardiac medication regimen includes a diuretic and an ACE inhibitor.  Compliance with treatment has been good.  She has not kept a blood pressure diary, but states that pressures have been well controlled.      ROS:     CONSTITUTIONAL:  Negative for chills and fever.      E/N/T:  Negative for ear pain, diminished hearing, frequent rhinorrhea and sore throat.      CARDIOVASCULAR:  Negative for chest pain, palpitations and pedal edema.      RESPIRATORY:  Negative for recent cough and dyspnea.      GASTROINTESTINAL:  Negative for abdominal pain, nausea and vomiting.      MUSCULOSKELETAL:  Positive for arthralgias and (LEFT SHOULDER FOR THE PAST FEW DAYS) back pain ( chronic; INTERMITTENT ).      NEUROLOGICAL:  Negative for headaches and paresthesias.          Past Medical History / Family History / Social History:         Last Reviewed on 2020 02:25 PM by Reza Dougherty    Past Medical History:                 PAST MEDICAL HISTORY             GYNECOLOGICAL HISTORY:                 ADVANCE DIRECTIVES: Living will         PREVENTIVE HEALTH MAINTENANCE             BONE DENSITY:  was last done 06- with normal results     COLORECTAL CANCER SCREENING: Up to date (colonoscopy q10y; sigmoidoscopy q5y; Cologuard q3y) was last done 2019, Results are in chart; colonoscopy with normal results     Hepatitis C Medicare Screening: was last done 2018     MAMMOGRAM: Done within last 2 years and results in are chart was last done 7/23/19 with normal results     PAP SMEAR: No longer indicated due to age and history         Surgical History:         Hysterectomy     BSO;         Family History:     Unremarkable         Social History:         Marital Status:      Children: 2 children         Tobacco/Alcohol/Supplements:     Last Reviewed on 2/19/2020 02:25 PM by Reza Dougherty    Tobacco: She has never smoked.      Caffeine:  She admits to consuming caffeine via coffee ( 3 servings per day ) and tea ( 1 serving per day ).          Substance Abuse History:     Last Reviewed on 2/19/2020 02:25 PM by Reza Dougherty    NEGATIVE         Current Problems:     Last Reviewed on 2/19/2020 02:26 PM by Reza Dougherty    Restless legs syndrome (RLS)    Pure hypercholesterolemia    Essential (primary) hypertension    Varicose veins of lower extremities, with Pain    Allergies    Radiculopathy, lumbosacral region    Other long term (current) drug therapy    Encounter for screening for depression        Immunizations:     Td adult 10/7/2010    zzPrevnar-13 8/11/2015    Fluzone (3 + years dose) 10/24/2011    Fluzone (3 + years dose) 10/26/2012    Fluzone High-Dose pf (>=65 yr) 11/19/2015    Fluzone High-Dose pf (>=65 yr) 3/14/2017    Fluzone High-Dose pf (>=65 yr) 1/29/2018    Fluzone High-Dose pf (>=65 yr) 12/28/2018    Fluzone High-Dose pf (>=65 yr) 11/4/2019    PNEUMOVAX 23 (Pneumococcal PPV23) 0/0/2006        Allergies:     Last Reviewed on 2/19/2020 02:26 PM by Reza Dougherty    No Known Allergies.        Current Medications:     Last Reviewed on 2/19/2020 02:25 PM by  Reza Dougherty    hydroCHLOROthiazide 25 mg oral tablet [one a day]    Vitamin B6     Vitamin B12     cranberrry pills 800mg DAILY     Vitamin D3 25 mcg (1,000 unit) oral tablet [Take 1 tablet(s) by mouth daily]    Pravastatin 80mg Tablet [Take 1 tablet(s) by mouth at bedtime]    abad 500mg 1 tab daily      Lidoderm 5% Adhesive Patch [1 patch q 12 hours on and 12 hours off]    Meloxicam 15mg Tablet [1 tab daily]    Baclofen 20mg Tablet [one po Qhs]    Lisinopril 10 mg oral tablet [1 tab daily]    Fenugreek     HYDROcodone-acetaminophen 5-325 mg oral tablet [1 PO Qdaily PRN for pain]        Objective:        Vitals:         Current: 2/19/2020 2:19:07 PM    Ht:  5 ft, 2.25 in;  Wt: 143.2 lbs;  BMI: 26.0T: 98.1 F (oral);  BP: 117/57 mm Hg (left arm, sitting);  P: 78 bpm (left arm (BP Cuff), sitting);  sCr: 0.86 mg/dL;  GFR: 51.11        Exams:     PHYSICAL EXAM:     GENERAL: vital signs recorded - well developed, well nourished;  no apparent distress;     NECK: trachea is midline; thyroid is non-palpable;     RESPIRATORY: normal respiratory rate and pattern with no distress; normal breath sounds with no rales, rhonchi, wheezes or rubs;     CARDIOVASCULAR: normal rate; rhythm is regular;  no systolic murmur; no edema;     LYMPHATIC: no enlargement of cervical or facial nodes; no supraclavicular nodes;     MUSCULOSKELETAL: normal gait; normal overall tone NEGATIVE LEFT SHOULDER EXAM.;     NEUROLOGIC: GROSSLY INTACT     PSYCHIATRIC:  appropriate affect and demeanor; normal speech pattern; grossly normal memory;         Assessment:         E78.0   Pure hypercholesterolemia       I10   Essential (primary) hypertension       M25.512   Pain in left shoulder           ORDERS:         Meds Prescribed:       [Refilled] HYDROcodone-acetaminophen 5-325 mg oral tablet [1 PO Qdaily PRN for pain], #30 (thirty) tablets, Refills: 0 (zero)                 Plan:         Pure hypercholesterolemia        MEDICATIONS: (no change to  current medication regimen)     FOLLOW-UP: Schedule a follow-up visit in 3 months.          Essential (primary) hypertension        MEDICATIONS: (no change to current medication regimen)     RECOMMENDATIONS given include: perform routine monitoring of blood pressure with home blood pressure cuff.          Pain in left shoulder        MEDICATIONS: (no change to current medication regimen)     RECOMMENDATIONS given include: heat therapy and active ROM.      CONSIDER X-RAYS/P.T. EVAL             Other Prescriptions:       [Refilled] HYDROcodone-acetaminophen 5-325 mg oral tablet [1 PO Qdaily PRN for pain], #30 (thirty) tablets, Refills: 0 (zero)         Patient Recommendations:        For  Pure hypercholesterolemia:    Schedule a follow-up visit in 3 months.          For  Essential (primary) hypertension:    Begin monitoring your blood pressure by brief nurse visits at our office, a home blood pressure monitor, or by checking on the machines in pharmacies or stores.  Keep a log of the readings.          For  Pain in left shoulder:    Apply heat to the affected area. Begin actively moving the joint in full range of motion; exercise it by flexing and extending and rotating the joint in circles.              Charge Capture:         Primary Diagnosis:     E78.0  Pure hypercholesterolemia           Orders:      19080  Office/outpatient visit; established patient, level 4  (In-House)              I10  Essential (primary) hypertension     M25.512  Pain in left shoulder

## 2021-05-18 NOTE — PROGRESS NOTES
Paige Cantu 1940     Office/Outpatient Visit    Visit Date:  03:15 pm    Provider: Fabi Boyd N.P. (Assistant: Radha Tariq RN)    Location: [facility]        Electronically signed by Fabi Boyd N.P. on  2018 10:41:18 AM                             SUBJECTIVE:        CC:     Ms. Cantu is a 77 year old White female.  Body aches, cough;         HPI:         Ms. Cantu presents with upper respiratory illness.  These have been present for the past 2 weeks.  Fever just over the last 2 days The symptoms include body aches, Chills, cough and fever.  She reports recent exposure to illness from family members.  She has already tried to relieve the symptoms with Hot tea with honey.  Pertinent medical history is unremarkable.      ROS:     CONSTITUTIONAL:  Positive for chills, fatigue and fever.      CARDIOVASCULAR:  Negative for chest pain, orthopnea, paroxysmal nocturnal dyspnea and pedal edema.      RESPIRATORY:  Positive for recent cough.   Negative for dyspnea, pleuritic chest pain or frequent wheezing.      GASTROINTESTINAL:  Negative for abdominal pain, constipation, diarrhea, nausea and vomiting.      MUSCULOSKELETAL:  Positive for myalgias.      NEUROLOGICAL:  Positive for headaches.      PSYCHIATRIC:  Negative for anxiety, depression, and sleep disturbances.          PM/FM/SH:     Last Reviewed on 2017 01:31 PM by Reza Dougherty    Past Medical History:                 PAST MEDICAL HISTORY             GYNECOLOGICAL HISTORY:        Last mammogram was              ADVANCE DIRECTIVES: Living will         PREVENTIVE HEALTH MAINTENANCE             BONE DENSITY: was last done 7/10/15 with normal results     COLORECTAL CANCER SCREENING: Up to date (colonoscopy q10y; sigmoidoscopy q5y; Cologuard q3y) was last done 08, Results are in chart; colonoscopy with normal results     MAMMOGRAM: was last done 7/15/15 with normal results          Surgical History:         Hysterectomy      BSO; Procedures: colonoscopy 2008-NEG;;         Family History:     Unremarkable         Social History:         Marital Status:      Children: 2 children         Tobacco/Alcohol/Supplements:     Last Reviewed on 11/16/2017 01:31 PM by Reza Dougherty    Tobacco: She has never smoked.      Caffeine:  She admits to consuming caffeine via coffee ( 3 servings per day ) and tea ( 1 serving per day ).          Substance Abuse History:     Last Reviewed on 11/16/2017 01:31 PM by Reza Dougherty    NEGATIVE             Current Problems:     Last Reviewed on 11/16/2017 01:32 PM by Reza Dougherty    Chronic intermittent low back pain     Allergies     Restless legs syndrome (RLS)     HTN     High cholesterol     Varicose veins of lower extremities, with Pain         Immunizations:     Td adult 10/7/2010     Prevnar-13 8/11/2015     Fluzone (3 + years dose) 10/24/2011     Fluzone (3 + years dose) 10/26/2012     Fluzone High-Dose pf (>=65 yr) 11/19/2015     Fluzone High-Dose pf (>=65 yr) 3/14/2017     PNEUMOVAX 23 (Pneumococcal PPV23) 0/0/2006         Allergies:     Last Reviewed on 1/08/2018 03:21 PM by Radha Tariq      No Known Drug Allergies.         Current Medications:     Last Reviewed on 1/08/2018 03:22 PM by Radha Tariq    Baclofen 20mg Tablet one po Qhs     Hydrocodone/Acetaminophen 5mg/325mg Tablet 1 PO Qdaily PRN for pain     Lidoderm 5% Adhesive Patch 1 patch q 12 hours on and 12 hours off     Meloxicam 15mg Tablet 1 tab daily     Pravastatin 80mg Tablet Take 1 tablet(s) by mouth at bedtime     Lisinopril 10mg Tablet 1 tab daily     Hydrochlorothiazide (HCTZ) 25mg Tablet one a day     Fenugreek     abad 500mg 1 tab daily     cranberrry pills 800mg DAILY     Vitamin D3 1,000IU Tablet Take 1 tablet(s) by mouth daily     Vitamin B12     Vitamin B6     fish oil 1000mg qday         OBJECTIVE:        Vitals:         Current: 1/8/2018  3:21:23 PM    Ht:  5 ft, 2.25 in;  Wt: 136 lbs;  BMI: 24.7    T: 99 F (oral);  BP: 138/75 mm Hg (left arm, sitting);  P: 81 bpm (left arm (BP Cuff), sitting);  sCr: 0.86 mg/dL;  GFR: 51.58        Exams:     PHYSICAL EXAM:     GENERAL: vital signs recorded - well developed, well nourished;  no apparent distress, appears minimally ill;     NECK: range of motion is normal; thyroid is non-palpable;     RESPIRATORY: normal respiratory rate and pattern with no distress; coarse breath sounds in the VERÓNICA and RUL;     CARDIOVASCULAR: normal rate; rhythm is regular;  no systolic murmur; no edema;     MUSCULOSKELETAL: normal gait; normal range of motion of all major muscle groups; no limb or joint pain with range of motion;     NEUROLOGICAL:  cranial nerves, motor and sensory function, reflexes, gait and coordination are all intact;     PSYCHIATRIC:  appropriate affect and demeanor; normal speech pattern; grossly normal memory;         Lab/Test Results:             Influenza A:  Negative (01/08/2018),     Influenza B:  Positive (01/08/2018),     Performed by::  sandra (01/08/2018),             ASSESSMENT:           487.8   J10.1  Influenza, with other manifestations              DDx:         ORDERS:         Meds Prescribed:       Benzonatate 200mg Capsules 1 capsule tid  #30 (Thirty) capsule(s) Refills: 0       Guiatuss-DM  (Dextromethorphan/Guaifenesin) Oral Liquid Take 2 teaspoon by mouth q4h prn for cough  #6 (Six) oz Refills: 0       Tamiflu (Oseltamivir) 75mg Capsules Take 1 capsule(s) by mouth bid for 5 days  #10 (Ten) capsule(s) Refills: 0         Lab Orders:       [id number]  Infectious agent antigen detection by immunoassay; Influenza  (In-House)         08327  Infectious agent antigen detection by immunoassay; Influenza  (In-House)                   PLAN:          Influenza, with other manifestations         RECOMMENDATIONS given include: instructed if no better or worsening over the next 3-5 days, will need to return to  offic e for evaluation.            Prescriptions:       Benzonatate 200mg Capsules 1 capsule tid  #30 (Thirty) capsule(s) Refills: 0       Guiatuss-DM  (Dextromethorphan/Guaifenesin) Oral Liquid Take 2 teaspoon by mouth q4h prn for cough  #6 (Six) oz Refills: 0       Tamiflu (Oseltamivir) 75mg Capsules Take 1 capsule(s) by mouth bid for 5 days  #10 (Ten) capsule(s) Refills: 0           Orders:       [id number]  Infectious agent antigen detection by immunoassay; Influenza  (In-House)         79724  Infectious agent antigen detection by immunoassay; Influenza  (In-House)               Patient Recommendations:        For  Influenza, with other manifestations:     I also recommend instructed if no better or worsening over the next 3-5 days, will need to return to offic e for evaluation.              CHARGE CAPTURE:           Primary Diagnosis:     487.8 Influenza, with other manifestations            J10.1    Influenza due to other identified influenza virus with other respiratory manifestations              Orders:          [id number]   Office/outpatient visit; established patient, level 3  (In-House)             29548 -59  Infectious agent antigen detection by immunoassay; Influenza  (In-House)             54821   Infectious agent antigen detection by immunoassay; Influenza  (In-House)

## 2021-05-18 NOTE — PROGRESS NOTES
Paige Cantu PIYUSH  1940     Office/Outpatient Visit    Visit Date: Fri, Mar 27, 2020 02:12 pm    Provider: Reza Dougherty MD (Assistant: Sharon Cavazos MA)    Location: [facility]        Electronically signed by Reza Dougherty MD on  2020 03:19:38 PM                             Subjective:        CC: Ms. Cantu is a 79 year old White female.  This is a follow-up visit.  workmans comp  PRESENT:  REZA BELTRÁN MD;         HPI:           Patient to be evaluated for radiculopathy, lumbosacral region.  The discomfort is most prominent in the lumbar spine.  This radiates to the left buttock,  left posterior thigh, and left calf.  She characterizes it as intermittent, moderate in intensity, burning, and WAXES AND WANES.  She states that the current episode of pain started years ago.  The event which precipitated this pain was lifting.  This occurred at work.  Associated symptoms include numbness in the legs and LEGS CRAMP, MOSTLY AT NIGHT.  She denies weakness of the legs.  She notes some pain relief with narcotic pain medication, NSAIDs, muscle relaxants, and LIDODERM.  Other details: X-RAYS SHOWED SOME DJD.  HAS NOT HAD AN MRI FOR SEVERAL YEARS..      ROS:     CONSTITUTIONAL:  Negative for chills, fatigue and fever.      E/N/T:  Negative for ear pain, diminished hearing, frequent rhinorrhea and sore throat.      CARDIOVASCULAR:  Negative for chest pain, palpitations and pedal edema.      RESPIRATORY:  Negative for recent cough and dyspnea.      GASTROINTESTINAL:  Negative for abdominal pain, anorexia, nausea and vomiting.      MUSCULOSKELETAL:  Negative for myalgias.      NEUROLOGICAL:  Negative for headaches and paresthesias.          Past Medical History / Family History / Social History:         Last Reviewed on 3/27/2020 02:51 PM by Reza Dougherty    Past Medical History:                 PAST MEDICAL HISTORY             GYNECOLOGICAL HISTORY:                  ADVANCE DIRECTIVES: Living will         PREVENTIVE HEALTH MAINTENANCE             BONE DENSITY: was last done 06- with normal results     COLORECTAL CANCER SCREENING: Up to date (colonoscopy q10y; sigmoidoscopy q5y; Cologuard q3y) was last done 2019, Results are in chart; colonoscopy with normal results     DENTAL CLEANING: has full dentures     EYE EXAM: was last done 2019     Hepatitis C Medicare Screening: was last done 2018     MAMMOGRAM: Done within last 2 years and results in are chart was last done 7/23/19 with normal results     PAP SMEAR: No longer indicated due to age and history         Surgical History:         Hysterectomy     BSO;         Family History:     Unremarkable         Social History:         Marital Status:      Children: 2 children         Tobacco/Alcohol/Supplements:     Last Reviewed on 3/27/2020 02:51 PM by Reza Dougherty    Tobacco: She has never smoked.      Caffeine:  She admits to consuming caffeine via coffee ( 3 servings per day ) and tea ( 1 serving per day ).          Substance Abuse History:     Last Reviewed on 3/27/2020 02:51 PM by Reza Dougherty    NEGATIVE         Current Problems:     Last Reviewed on 3/27/2020 02:51 PM by Reza Dougherty    Restless legs syndrome (RLS)    Pure hypercholesterolemia    Essential (primary) hypertension    Varicose veins of lower extremities, with Pain    Allergies    Radiculopathy, lumbosacral region    Other long term (current) drug therapy    Pain in left shoulder        Immunizations:     Td adult 10/7/2010    zzPrevnar-13 8/11/2015    Fluzone (3 + years dose) 10/24/2011    Fluzone (3 + years dose) 10/26/2012    Fluzone High-Dose pf (>=65 yr) 11/19/2015    Fluzone High-Dose pf (>=65 yr) 3/14/2017    Fluzone High-Dose pf (>=65 yr) 1/29/2018    Fluzone High-Dose pf (>=65 yr) 12/28/2018    Fluzone High-Dose pf (>=65 yr) 11/4/2019    PNEUMOVAX 23 (Pneumococcal PPV23) 0/0/2006        Allergies:      Last Reviewed on 3/27/2020 02:51 PM by Reza Dougherty    No Known Allergies.        Current Medications:     Last Reviewed on 3/27/2020 02:51 PM by Reza Dougherty    hydroCHLOROthiazide 25 mg oral tablet [one a day]    Vitamin B6     Vitamin B12     cranberrry pills 800mg DAILY     Vitamin D3 25 mcg (1,000 unit) oral tablet [Take 1 tablet(s) by mouth daily]    Pravastatin 80mg Tablet [Take 1 tablet(s) by mouth at bedtime]    abad 500mg 1 tab daily      Meloxicam 15mg Tablet [1 tab daily]    Lidoderm 5% Adhesive Patch [1 patch q 12 hours on and 12 hours off]    Baclofen 20mg Tablet [one po Qhs]    lisinopriL 10 mg oral tablet [TAKE 1 TABLET BY MOUTH DAILY]    Fenugreek     HYDROcodone-acetaminophen 5-325 mg oral tablet [1 PO Qdaily PRN for pain]        Objective:        Vitals: PATIENT TOOK VITALS AT HOME        Current: 3/27/2020 2:15:06 PM    Ht:  5 ft, 2.25 inT: 98 F (oral);  BP: 116/63 mm Hg (left arm, sitting);  sCr: 0.86 mg/dL;  GFR: 53.16        Assessment:         M54.17   Radiculopathy, lumbosacral region           Plan:         Radiculopathy, lumbosacral region        MEDICATIONS: (no change to current medication regimen)     FOLLOW-UP: Schedule a follow-up visit in 3 months.      CONSIDER REPEAT P.T. EVAL Telehealth: Verbal consent obtained for visit to occur via phone call; Total time spent was 5 minutes; [id number]--[id number] E/M 5-10 minutes Controlled substance documentation: [place] reviewed; prior drug screen consistent; consent is reviewed and signed and on the chart.  She is aware of risk of addiction on this medication, understands that she will need to follow up for a review every 3 months and her medications will be adjusted or decreased as deemed appropriate at each visit.  No history of drug or alcohol abuse.  No concerns about diversion or abuse. She denies side effects related to the medication.  She is aware that she may be called in for pill counts.  The dosing of this  medication will be reviewed on a regular basis and reduced if possible..  Ongoing use of a controlled substance is necessary for this patient to have a normal quality of life             Patient Recommendations:        For  Radiculopathy, lumbosacral region:    Schedule a follow-up visit in 3 months.              Charge Capture:         Primary Diagnosis:     M54.17  Radiculopathy, lumbosacral region           Orders:      [id number]  Phys/QHP telephone evaluation 5-10 min  (In-House)

## 2021-05-18 NOTE — PROGRESS NOTES
Paige Cantu 1940     Office/Outpatient Visit    Visit Date:  03:50 pm    Provider: Reza Dougherty MD (Assistant: Bryce Zepeda)    Location: [facility]        Electronically signed by Reza Dougherty MD on  2019 12:39:53 PM                             SUBJECTIVE:        CC:     Ms. Cantu is a 78 year old White female.  left eye red and itchy;         HPI:         Complaint of eye pain..  The symptom began 1 week ago.  The severity is of mild intensity.  WENT TO [facility] LAST WEEK, STATES NEG FLOURESCEIN AND SLIT LAMP EXAM.  IRRITATION PERSISTS     ROS:     CONSTITUTIONAL:  Negative for chills and fever.      EYES:  Negative for blurred vision.      E/N/T:  Negative for ear pain and nasal congestion.      GASTROINTESTINAL:  Negative for abdominal pain, nausea and vomiting.      MUSCULOSKELETAL:  Negative for arthralgias and myalgias.      INTEGUMENTARY/BREAST:  Negative for rash.      NEUROLOGICAL:  Negative for paresthesias and weakness.          PMH/FM/SH:     Last Reviewed on 2019 04:05 PM by Reza Dougherty    Past Medical History:                 PAST MEDICAL HISTORY             GYNECOLOGICAL HISTORY:                 ADVANCE DIRECTIVES: Living will         PREVENTIVE HEALTH MAINTENANCE             BONE DENSITY: was last done 2018 with normal results     COLORECTAL CANCER SCREENING: Up to date (colonoscopy q10y; sigmoidoscopy q5y; Cologuard q3y) was last done 08, Results are in chart; colonoscopy with normal results     Hepatitis C Medicare Screening: was last done      MAMMOGRAM: Done within last 2 years and results in are chart was last done 2018 normal         Surgical History:         Hysterectomy      BSO;         Family History:     Unremarkable         Social History:         Marital Status:      Children: 2 children         Tobacco/Alcohol/Supplements:     Last Reviewed on 2019 04:04 PM by Reza Dougherty     Tobacco: She has never smoked.      Caffeine:  She admits to consuming caffeine via coffee ( 3 servings per day ) and tea ( 1 serving per day ).          Substance Abuse History:     Last Reviewed on 1/09/2019 04:04 PM by Reza Dougherty    NEGATIVE             Current Problems:     Last Reviewed on 1/09/2019 04:06 PM by Reza Dougherty    Chronic intermittent low back pain     Allergies     Restless legs syndrome (RLS)     HTN     High cholesterol     Varicose veins of lower extremities, with Pain         Immunizations:     Td adult 10/7/2010     zzPrevnar-13 8/11/2015     Fluzone (3 + years dose) 10/24/2011     Fluzone (3 + years dose) 10/26/2012     Fluzone High-Dose pf (>=65 yr) 11/19/2015     Fluzone High-Dose pf (>=65 yr) 3/14/2017     Fluzone High-Dose pf (>=65 yr) 1/29/2018     Fluzone High-Dose pf (>=65 yr) 12/28/2018     PNEUMOVAX 23 (Pneumococcal PPV23) 0/0/2006         Allergies:     Last Reviewed on 1/09/2019 04:04 PM by Reza Dougherty      No Known Drug Allergies.         Current Medications:     Last Reviewed on 12/11/2018 03:19 PM by Reza Dougherty    Hydrochlorothiazide (HCTZ) 25mg Tablet one a day     Hydrocodone/Acetaminophen 5mg/325mg Tablet 1 PO Qdaily PRN for pain     Lisinopril 10mg Tablet 1 tab daily     Pravastatin 80mg Tablet Take 1 tablet(s) by mouth at bedtime     Meloxicam 15mg Tablet 1 tab daily     Baclofen 20mg Tablet one po Qhs     Lidoderm 5% Adhesive Patch 1 patch q 12 hours on and 12 hours off     Fenugreek     abad 500mg 1 tab daily     cranberrry pills 800mg DAILY     Vitamin D3 1,000IU Tablet Take 1 tablet(s) by mouth daily     Vitamin B12     Vitamin B6     fish oil 1000mg qday         OBJECTIVE:        Vitals:         Current: 1/9/2019 3:56:35 PM    Ht:  5 ft, 2.25 in;  Wt: 142.4 lbs;  BMI: 25.8    T: 97.7 F (oral);  BP: 158/64 mm Hg (right arm, sitting);  P: 71 bpm (right arm (BP Cuff), sitting);  sCr: 0.99 mg/dL;  GFR: 44.99        Exams:      PHYSICAL EXAM:     GENERAL: vital signs recorded - well developed, well nourished;  no apparent distress;     EYES: lids and lacrimal system are normal in appearance; extraocular movements intact; hyperemic left conjunctiva;  PERRL;     E/N/T:  normal EACs, TMs, nasal/oral mucosa, teeth, gingiva, and oropharynx;     LYMPHATIC: no enlargement of cervical or facial nodes;     NEUROLOGIC: GROSSLY INTACT         ASSESSMENT           372.00   H10.33  Left Acute conjunctivitis, unspecified NONSPECIFIC, PERSISTENT              DDx:         ORDERS:         Meds Prescribed:       Erythromycin Ophthalmic 0.5% Ophthalmic Ointment Apply 1/2 inch ribbon of ointment to affected eye(s) tid  #3.5 (Three point Five) gm Refills: 0       Keflex (Cephalexin) 500mg Capsules 1 po TID  #21 (Twenty One) capsule(s) Refills: 0                 PLAN:         Left Acute conjunctivitis, unspecified         FOLLOW-UP: Schedule follow-up appointments on a p.r.n. basis.      ADVISED THAT SHE NEEDS TO SEE AN EYE DOCTOR ASAP.  Go to ER if worse.            Prescriptions:       Erythromycin Ophthalmic 0.5% Ophthalmic Ointment Apply 1/2 inch ribbon of ointment to affected eye(s) tid  #3.5 (Three point Five) gm Refills: 0       Keflex (Cephalexin) 500mg Capsules 1 po TID  #21 (Twenty One) capsule(s) Refills: 0             Patient Recommendations:        For Left Acute conjunctivitis, unspecified:     Schedule follow-up appointments as needed.  I also recommend ADVISED THAT SHE NEEDS TO SEE AN EYE DOCTOR ASAP..              CHARGE CAPTURE           **Please note: ICD descriptions below are intended for billing purposes only and may not represent clinical diagnoses**        Primary Diagnosis:         372.00 Left Acute conjunctivitis, unspecified            H10.33    Unspecified acute conjunctivitis, bilateral              Orders:          [id number]   Office/outpatient visit; established patient, level 3  (In-House)               ADDENDUMS:       ____________________________________    Addendum: 01/10/2019 02:09 PM - Two, Team         Visit Note Faxed to:        Ranjana Miller  (Optometrist); Number [phone]       Ranjana Miller  (Optometrist); Number [phone]

## 2021-05-18 NOTE — PROGRESS NOTES
Paige Cantu 1940     Office/Outpatient Visit    Visit Date: Tue, Dec 11, 2018 02:50 pm    Provider: Reza Dougherty MD (Assistant: Nicolasa Bailey MA)    Location: [facility]        Electronically signed by Reza Dougherty MD on  2018 02:37:34 PM                             SUBJECTIVE:        CC:     Ms. Cantu is a 77 year old White female.  This visit is covered under Worker's Compensation.  check up;         HPI:         Patient to be evaluated for chronic intermittent low back pain.  The discomfort is most prominent in the lumbar spine.  This radiates to the left buttock,  left posterior thigh, and left calf.  She characterizes it as intermittent, moderate in intensity, burning, and WAXES AND WANES.  She states that the current episode of pain started years ago.  The event which precipitated this pain was lifting.  This occurred at work.  Associated symptoms include numbness in the legs and LEGS CRAMP, MOSTLY AT NIGHT.  She denies weakness of the legs.  She notes some pain relief with narcotic pain medication, NSAIDs, muscle relaxants, USES NARCOTICS SAPARINGLY, and LIDODERM.  Other details: X-RAYS SHOWED SOME DJD.  HAS NOT HAD AN MRI FOR SEVERAL YEARS..      ROS:         MUSCULOSKELETAL:  Positive for back pain ( recurrent ).      NEUROLOGICAL:  Positive for paresthesia ( left lower extremity ).          PMH/FMH/SH:     Last Reviewed on 2018 03:19 PM by Reza Dougherty    Past Medical History:                 PAST MEDICAL HISTORY             GYNECOLOGICAL HISTORY:                 ADVANCE DIRECTIVES: Living will         PREVENTIVE HEALTH MAINTENANCE             BONE DENSITY: was last done 2018 with normal results     COLORECTAL CANCER SCREENING: Up to date (colonoscopy q10y; sigmoidoscopy q5y; Cologuard q3y) was last done 08, Results are in chart; colonoscopy with normal results     MAMMOGRAM: Done within last 2 years and results in are chart was last done  06- normal         Surgical History:         Hysterectomy      BSO;         Family History:     Unremarkable         Social History:         Marital Status:      Children: 2 children         Tobacco/Alcohol/Supplements:     Last Reviewed on 12/11/2018 03:18 PM by Reza Dougherty    Tobacco: She has never smoked.      Caffeine:  She admits to consuming caffeine via coffee ( 3 servings per day ) and tea ( 1 serving per day ).          Substance Abuse History:     Last Reviewed on 12/11/2018 03:18 PM by Reza Dougherty    NEGATIVE             Current Problems:     Last Reviewed on 12/11/2018 03:20 PM by Reza Dougherty    Chronic intermittent low back pain     Allergies     Restless legs syndrome (RLS)     HTN     High cholesterol     Varicose veins of lower extremities, with Pain         Immunizations:     Td adult 10/7/2010     zzPrevnar-13 8/11/2015     Fluzone (3 + years dose) 10/24/2011     Fluzone (3 + years dose) 10/26/2012     Fluzone High-Dose pf (>=65 yr) 11/19/2015     Fluzone High-Dose pf (>=65 yr) 3/14/2017     Fluzone High-Dose pf (>=65 yr) 1/29/2018     PNEUMOVAX 23 (Pneumococcal PPV23) 0/0/2006         Allergies:     Last Reviewed on 12/11/2018 03:18 PM by Reza Dougherty      No Known Drug Allergies.         Current Medications:     Last Reviewed on 12/11/2018 03:19 PM by Reza Dougherty    Hydrochlorothiazide (HCTZ) 25mg Tablet one a day     Lisinopril 10mg Tablet 1 tab daily     Hydrocodone/Acetaminophen 5mg/325mg Tablet 1 PO Qdaily PRN for pain     Meloxicam 15mg Tablet 1 tab daily     Baclofen 20mg Tablet one po Qhs     Lidoderm 5% Adhesive Patch 1 patch q 12 hours on and 12 hours off     Pravastatin 80mg Tablet Take 1 tablet(s) by mouth at bedtime     Fenugreek     abad 500mg 1 tab daily     cranberrry pills 800mg DAILY     Vitamin D3 1,000IU Tablet Take 1 tablet(s) by mouth daily     Vitamin B12     Vitamin B6     fish oil 1000mg qday          OBJECTIVE:        Vitals:         Current: 12/11/2018 2:59:20 PM    Ht:  5 ft, 2.25 in;  Wt: 140 lbs;  BMI: 25.4    T: 98.6 F (oral);  BP: 151/70 mm Hg (left arm, sitting);  P: 80 bpm (left arm (BP Cuff), sitting);  sCr: 0.99 mg/dL;  GFR: 45.36        Exams:     PHYSICAL EXAM:     GENERAL: vital signs recorded - well developed, well nourished;  no apparent distress;     NEUROLOGIC: GROSSLY INTACT     PSYCHIATRIC:  appropriate affect and demeanor; normal speech pattern; grossly normal memory; LOW BACK examination: Inspection: normal;     Palpation: lumbar tenderness;     Neuro-vascular: normal;     Muscular Strength: normal;     Range of Motion: LAROM;     Maneuvers: (+) left straight leg raise.              ASSESSMENT           724.2   M54.17  Chronic intermittent low back pain              DDx:         ORDERS:         Meds Prescribed:       Refill of: Hydrocodone/Acetaminophen 5mg/325mg Tablet 1 PO Qdaily PRN for pain  #30 (Thirty) tablet(s) Refills: 0                 PLAN:          Chronic intermittent low back pain         MEDICATIONS: (no change to current medication regimen)     FOLLOW-UP: Schedule a follow-up visit in 6 months.      CONSIDER REPEAT P.T. EVAL           Prescriptions:       Refill of: Hydrocodone/Acetaminophen 5mg/325mg Tablet 1 PO Qdaily PRN for pain  #30 (Thirty) tablet(s) Refills: 0             Patient Recommendations:        For  Chronic intermittent low back pain:     Schedule a follow-up visit in 6 months.              CHARGE CAPTURE           **Please note: ICD descriptions below are intended for billing purposes only and may not represent clinical diagnoses**        Primary Diagnosis:         724.2 Chronic intermittent low back pain            M54.17    Radiculopathy, lumbosacral region              Orders:          06545   Office/outpatient visit; established patient, level 3  (In-House)

## 2021-05-18 NOTE — PROGRESS NOTES
Paige Cantu 1940     Office/Outpatient Visit    Visit Date: Fri, Apr 6, 2018 03:23 pm    Provider: Reza Dougherty MD (Assistant: Nicolasa Bailey MA)    Location: [facility]        Electronically signed by Reza Dougherty MD on  04/07/2018 04:37:40 PM                             SUBJECTIVE:        CC:     Ms. Cantu is a 77 year old White female.  MEDICARE WELLNESS;         HPI:         Ms. Cantu is here for a Medicare wellness visit.  Individual and family medical history was reviewed and updated A list of current providers and suppliers reviewed and updated A current height, weight, BMI, blood pressure, and pulse were recorded in the vitals section of the note and have been reviewed A review of cognitive impairment was performed and was negative.  A review of functional ability and level of safety was performed and was negative She denies having trouble hearing the TV/radio when others do not, having to strain to hear or understand conversations and wearing hearing aid(s).  Concerning home safety, She denies her home having throw rugs, poor lighting and a slippery bath or shower.   She has grab bars in the bath, handrails on stairs and functioning smoke alarms.  Has not had any falls or only one fall without injury in the past year.  Advance Care Directive updated today in [facility] Tobacco: She has never smoked.          She denies dissatisfaction with her life, getting bored often, feeling helpless, preferring to stay at home rather than going out and doing new things and feeling worthless the way she is now.  Total score is 0     ROS:     CONSTITUTIONAL:  Negative for chills and fever.      EYES:  Negative for blurred vision and eye drainage.      E/N/T:  Negative for ear pain, diminished hearing, frequent rhinorrhea and sore throat.      CARDIOVASCULAR:  Negative for chest pain, palpitations and pedal edema.      RESPIRATORY:  Negative for recent cough and dyspnea.      GASTROINTESTINAL:  Negative  for abdominal pain, anorexia, constipation, diarrhea, nausea and vomiting.      GENITOURINARY:  Negative for dysuria and hematuria.      MUSCULOSKELETAL:  Positive for back pain ( chronic; INTERMITTENT ).      INTEGUMENTARY/BREAST:  Negative for breast mass and breast tenderness.      NEUROLOGICAL:  Negative for headaches and paresthesias.          PMH/FMH/SH:     Last Reviewed on 2018 03:37 PM by Reza Dougherty    Past Medical History:                 PAST MEDICAL HISTORY             GYNECOLOGICAL HISTORY:                 ADVANCE DIRECTIVES: Living will         PREVENTIVE HEALTH MAINTENANCE             BONE DENSITY: was last done 7/10/15 with normal results     COLORECTAL CANCER SCREENING: Up to date (colonoscopy q10y; sigmoidoscopy q5y; Cologuard q3y) was last done 08, Results are in chart; colonoscopy with normal results     MAMMOGRAM: was last done 7/15/15 with normal results         Surgical History:         Hysterectomy      BSO; Procedures: colonoscopy -NEG;;         Family History:     Unremarkable         Social History:         Marital Status:      Children: 2 children         Tobacco/Alcohol/Supplements:     Last Reviewed on 2018 03:35 PM by Reza Dougherty    Tobacco: She has never smoked.      Caffeine:  She admits to consuming caffeine via coffee ( 3 servings per day ) and tea ( 1 serving per day ).          Substance Abuse History:     Last Reviewed on 2018 03:35 PM by Reza Dougherty    NEGATIVE             Current Problems:     Last Reviewed on 2018 03:38 PM by Reza Dougherty    Chronic intermittent low back pain     Allergies     Restless legs syndrome (RLS)     HTN     High cholesterol     Varicose veins of lower extremities, with Pain     Screening for depression     Other problems related to lifestyle     Screening for osteoporosis     Screening breast exam - other         Immunizations:     Td adult 10/7/2010      zzPrevnar-13 8/11/2015     Fluzone (3 + years dose) 10/24/2011     Fluzone (3 + years dose) 10/26/2012     Fluzone High-Dose pf (>=65 yr) 11/19/2015     Fluzone High-Dose pf (>=65 yr) 3/14/2017     Fluzone High-Dose pf (>=65 yr) 1/29/2018     PNEUMOVAX 23 (Pneumococcal PPV23) 0/0/2006         Allergies:     Last Reviewed on 4/06/2018 03:25 PM by Nicolasa Bailey      No Known Drug Allergies.         Current Medications:     Last Reviewed on 4/06/2018 03:37 PM by Reza Dougherty    Hydrocodone/Acetaminophen 5mg/325mg Tablet 1 PO Qdaily PRN for pain     Lisinopril 10mg Tablet 1 tab daily     Meloxicam 15mg Tablet 1 tab daily     Hydrochlorothiazide (HCTZ) 25mg Tablet one a day     Baclofen 20mg Tablet one po Qhs     Lidoderm 5% Adhesive Patch 1 patch q 12 hours on and 12 hours off     Pravastatin 80mg Tablet Take 1 tablet(s) by mouth at bedtime     Fenugreek     abad 500mg 1 tab daily     cranberrry pills 800mg DAILY     Vitamin D3 1,000IU Tablet Take 1 tablet(s) by mouth daily     Vitamin B12     Vitamin B6     fish oil 1000mg qday         OBJECTIVE:        Vitals:         Current: 4/6/2018 3:25:36 PM    Ht:  5 ft, 2.25 in;  Wt: 136.8 lbs;  BMI: 24.8    T: 98.1 F (oral);  BP: 131/74 mm Hg (left arm, sitting);  P: 77 bpm (left arm (BP Cuff), sitting);  sCr: 0.86 mg/dL;  GFR: 51.71        Exams:     PHYSICAL EXAM:     GENERAL: vital signs recorded - well developed, well nourished;  no apparent distress;     EYES: conjunctiva and cornea are normal;     E/N/T:  normal EACs, TMs, nasal/oral mucosa, teeth, gingiva, and oropharynx;     NECK: trachea is midline; thyroid is non-palpable; carotid exam reveals no bruits;     RESPIRATORY: normal respiratory rate and pattern with no distress; normal breath sounds with no rales, rhonchi, wheezes or rubs;     CARDIOVASCULAR: normal rate; rhythm is regular;  no systolic murmur; no edema;     GASTROINTESTINAL: nontender;     LYMPHATIC: no enlargement of cervical or facial  nodes; no supraclavicular nodes; no axillary adenopathy;     BREAST/INTEGUMENT: BREASTS: breast exam is normal without masses, skin changes, or nipple discharge;     MUSCULOSKELETAL: normal gait; normal overall tone     NEUROLOGIC: GROSSLY INTACT     PSYCHIATRIC:  appropriate affect and demeanor; normal speech pattern; grossly normal memory;         ASSESSMENT           Adult annual physical    V70.0   Z00.00  Annual exam              DDx:     V76.19   Z12.39  Screening breast exam - other              DDx:     V82.81   Z13.820  Screening for osteoporosis              DDx:     V69.8   Z72.89  Other problems related to lifestyle              DDx:     V79.0   Z13.89  Screening for depression              DDx:     272.0   E78.0  High cholesterol              DDx:     401.1   I10  HTN              DDx:         ORDERS:         Radiology/Test Orders:       40440  Screening mammography, bilateral (2-view film study of each breast)  (Send-Out)         75746  DXA, bone density study, 1 or more sites; axial skeleton (eg hips, pelvis, spine)  (Send-Out)           Lab Orders:       FUTURE  Future order to be done at patients convenience  (Send-Out)         14790  [facility] Hepatitis C Ab (Medicare Screen)  (Send-Out)         19323  COMP [facility] Comp. Metabolic Panel  (Send-Out)         49791  LPDP [facility] Lipid Panel  (Send-Out)         95685  BDCBC [facility] CBC with 3 part diff  (Send-Out)         98435  TSH - [facility] TSH  (Send-Out)           Procedures Ordered:         Annual wellness visit, includes a PPPS, subsequent visit  (In-House)           Other Orders:         Depression screen negative  (In-House)                   PLAN:          Annual exam         COUNSELING provided on: fall prevention, healthy eating habits, Medicare Preventative Services Guide given to patient., regular exercise, use of seat belts, and ADVISED TO SEE AN EYE DOCTOR AND DENTIST REGULARLY.      FOLLOW-UP: Schedule a follow-up visit in 6 months.  SHE WILL NEED REPEAT COLONOSCOPY LATER THIS YEAR MIPS Negative Depression Screen           Orders:         Annual wellness visit, includes a PPPS, subsequent visit  (In-House)           Depression screen negative  (In-House)            Screening breast exam - other         FOLLOW-UP TESTING #1:    RADIOLOGY:  I have ordered screening mammogram to be done today.            Orders:       79610  Screening mammography, bilateral (2-view film study of each breast)  (Send-Out)            Screening for osteoporosis         FOLLOW-UP TESTING #1:    RADIOLOGY:  I have ordered Dexa Scan to be done today.            Orders:       61237  DXA, bone density study, 1 or more sites; axial skeleton (eg hips, pelvis, spine)  (Send-Out)            Other problems related to lifestyle         FOLLOW-UP TESTING #1: FOLLOW-UP LABORATORY:  Labs to be scheduled in the future include Hepatitis C Ab (Medicare Screen).            Orders:       FUTURE  Future order to be done at patients convenience  (Send-Out)         19394  [facility] Hepatitis C Ab (Medicare Screen)  (Send-Out)            High cholesterol         FOLLOW-UP TESTING #1: FOLLOW-UP LABORATORY:  Labs to be scheduled in the future include CMP and lipid panel.            Orders:       90548  COMP [facility] Comp. Metabolic Panel  (Send-Out)         45501  LPDP [facility] Lipid Panel  (Send-Out)             Patient Education Handouts:       Hyperlipidemia (Hyperlipoproteinemia)           HTN         FOLLOW-UP TESTING #1: FOLLOW-UP LABORATORY:  Labs to be scheduled in the future include CBC and TSH.            Orders:       15682  BDCBC [facility] CBC with 3 part diff  (Send-Out)         94145  TSH [facility] TSH  (Send-Out)               Patient Recommendations:        For  Annual exam:         Regularly exercise within recommended guidelines, especially to maintain balance. Remove obstacles in walkways at home.  Use non-skid material for bathtub safety.  Remove loose throw rugs from floor.  Use nightlights in bedrooms, hallways, and bathrooms.    Limit dietary intake of fat (especially saturated fat) and cholesterol.  Eat a variety of foods, including plenty of fruits, vegetables, and grain containg fiber, limit fat intake to 30% of total calories. Balance caloric intake with energy expended.    Maintaining regular physical activity is advised to help prevent heart disease, hypertension, diabetes, and obesity.    Always use shoulder/lap restraints when driving or riding in a vehicle, even those equipped with air bags.  Schedule a follow-up visit in 6 months.          For  Other problems related to lifestyle:             The following laboratory testing has been ordered:         For  High cholesterol:             The following laboratory testing has been ordered: metabolic panel, comprehensive lipid panel         For  HTN:             The following laboratory testing has been ordered: CBC TSH             CHARGE CAPTURE           **Please note: ICD descriptions below are intended for billing purposes only and may not represent clinical diagnoses**        Primary Diagnosis:         Adult annual physical    V70.0 Annual exam            Z00.00    Encounter for general adult medical examination without abnormal findings              Orders:             Annual wellness visit, includes a PPPS, subsequent visit  (In-House)                Depression screen negative  (In-House)           V76.19 Screening breast exam - other            Z12.39    Encounter for other screening for malignant neoplasm of breast    V82.81 Screening for osteoporosis            Z13.820    Encounter for screening for osteoporosis    V69.8 Other problems related to lifestyle            Z72.89    Other problems related to lifestyle    V79.0 Screening for depression            Z13.89    Encounter for screening for other disorder    272.0 High cholesterol            E78.0    Pure hypercholesterolemia    401.1 HTN            I10     Essential (primary) hypertension

## 2021-05-18 NOTE — PROGRESS NOTES
"Paige Cantu  1940     Office/Outpatient Visit    Visit Date: Fri, Jun 26, 2020 02:51 pm    Provider: Reza Dougherty MD (Assistant: Nuris Emery MA)    Location: [facility]        Electronically signed by Reza Dougherty MD on  06/26/2020 04:30:23 PM                             Subjective:        CC: Ms. Cantu is a 79 year old White female.  This is a follow-up visit.          HPI:           Ms. Cantu presents with pure hypercholesterolemia.  Current treatment includes a lipid lowering agent.  Compliance with treatment has been good.  Most recent lab tests include Total Cholesterol:  189 (mg/dL) (12/04/2019), HDL:  65 (mg/dL) (12/04/2019), Triglycerides:  138 (mg/dL) (12/04/2019), LDL:  96 (mg/dL) (12/04/2019), VLDL Cholesterol:  28 (mg/dl) (12/04/2019), CHOL/HDLC RATIO:  2.9 (NA) (12/04/2019).            With regard to the essential (primary) hypertension, her current cardiac medication regimen includes a diuretic and an ACE inhibitor.  Compliance with treatment has been good.  She has not kept a blood pressure diary, but states that pressures have been well controlled.      ROS:     CONSTITUTIONAL:  Negative for chills and fever.      EYES:  Positive for SEES \"SPOTS\" ON OCCASION.   Negative for eye drainage or eye pain.      E/N/T:  Negative for ear pain, diminished hearing, frequent rhinorrhea and sore throat.      CARDIOVASCULAR:  Negative for chest pain, palpitations and pedal edema.      RESPIRATORY:  Negative for recent cough and dyspnea.      GASTROINTESTINAL:  Negative for abdominal pain, nausea and vomiting.      MUSCULOSKELETAL:  Positive for back pain ( chronic; INTERMITTENT ).   Negative for myalgias.      NEUROLOGICAL:  Negative for headaches and paresthesias.          Past Medical History / Family History / Social History:         Last Reviewed on 6/26/2020 03:00 PM by Reza Dougherty    Past Medical History:                 PAST MEDICAL HISTORY             " GYNECOLOGICAL HISTORY:                 ADVANCE DIRECTIVES: Living will         PREVENTIVE HEALTH MAINTENANCE             BONE DENSITY: was last done 2018 with normal results     COLORECTAL CANCER SCREENING: Up to date (colonoscopy q10y; sigmoidoscopy q5y; Cologuard q3y) was last done , Results are in chart; colonoscopy with normal results     DENTAL CLEANING: has full dentures     EYE EXAM: was last done      Hepatitis C Medicare Screening: was last done      MAMMOGRAM: Done within last 2 years and results in are chart was last done 19 with normal results     PAP SMEAR: No longer indicated due to age and history         Surgical History:         Hysterectomy     BSO;         Family History:     Unremarkable         Social History:         Marital Status:      Children: 2 children         Tobacco/Alcohol/Supplements:     Last Reviewed on 2020 03:00 PM by Reza Dougherty    Tobacco: She has never smoked.      Caffeine:  She admits to consuming caffeine via coffee ( 3 servings per day ) and tea ( 1 serving per day ).          Substance Abuse History:     Last Reviewed on 2020 03:00 PM by Reza Dougherty    NEGATIVE         Current Problems:     Last Reviewed on 2020 03:00 PM by Reza Dougherty    Restless legs syndrome (RLS)    Pure hypercholesterolemia    Essential (primary) hypertension    Varicose veins of lower extremities, with Pain    Allergies    Radiculopathy, lumbosacral region    Other long term (current) drug therapy        Immunizations:     Td adult 10/7/2010    zzPrevnar-13 2015    Fluzone (3 + years dose) 10/24/2011    Fluzone (3 + years dose) 10/26/2012    Fluzone High-Dose pf (>=65 yr) 2015    Fluzone High-Dose pf (>=65 yr) 3/14/2017    Fluzone High-Dose pf (>=65 yr) 2018    Fluzone High-Dose pf (>=65 yr) 2018    Fluzone High-Dose pf (>=65 yr) 2019    PNEUMOVAX 23 (Pneumococcal PPV23)          Allergies:     Last Reviewed on 6/26/2020 03:00 PM by Reza Dougherty    No Known Allergies.        Current Medications:     Last Reviewed on 6/26/2020 03:00 PM by Reza Dougherty    hydroCHLOROthiazide 25 mg oral tablet [one a day]    Vitamin B6     Vitamin B12     cranberrry pills 800mg DAILY     Vitamin D3 25 mcg (1,000 unit) oral tablet [Take 1 tablet(s) by mouth daily]    Pravastatin 80mg Tablet [Take 1 tablet(s) by mouth at bedtime]    abad 500mg 1 tab daily      Lidoderm 5% Adhesive Patch [1 patch q 12 hours on and 12 hours off]    Meloxicam 15mg Tablet [1 tab daily]    Baclofen 20mg Tablet [one po Qhs]    lisinopriL 10 mg oral tablet [TAKE 1 TABLET BY MOUTH DAILY]    Fenugreek     HYDROcodone-acetaminophen 5-325 mg oral tablet [1 PO Qdaily PRN for pain]        Objective:        Vitals:         Current: 6/26/2020 2:58:12 PM    Ht:  5 ft, 2.25 in;  Wt: 142.8 lbs;  BMI: 25.9T: 98 F (oral);  BP: 150/62 mm Hg (left arm, sitting);  P: 68 bpm (left arm (BP Cuff), sitting);  sCr: 0.86 mg/dL;  GFR: 51.05        Exams:     PHYSICAL EXAM:     GENERAL: vital signs recorded - well developed, well nourished;  no apparent distress;     EYES: lids and lacrimal system are normal in appearance; extraocular movements intact; conjunctiva and cornea are normal; PERRL;     NECK: trachea is midline; thyroid is non-palpable;     RESPIRATORY: normal respiratory rate and pattern with no distress; normal breath sounds with no rales, rhonchi, wheezes or rubs;     CARDIOVASCULAR: normal rate; rhythm is regular;  no systolic murmur; no edema;     LYMPHATIC: no enlargement of cervical or facial nodes; no supraclavicular nodes;     MUSCULOSKELETAL: normal gait; normal overall tone     NEUROLOGIC: GROSSLY INTACT     PSYCHIATRIC:  appropriate affect and demeanor; normal speech pattern; grossly normal memory;         Assessment:         E78.0   Pure hypercholesterolemia       I10   Essential (primary) hypertension       H53.8    Other visual disturbances           ORDERS:         Meds Prescribed:       [Refilled] hydroCHLOROthiazide 25 mg oral tablet [one a day], #90 (ninety) tablets, Refills: 1 (one)       [Refilled] lisinopriL 10 mg oral tablet [TAKE 1 TABLET BY MOUTH DAILY], #90 (ninety) tablets, Refills: 1 (one)       [Refilled] HYDROcodone-acetaminophen 5-325 mg oral tablet [1 PO Qdaily PRN for pain], #30 (thirty) tablets, Refills: 0 (zero)         Radiology/Test Orders:       3017F  Colorectal CA screen results documented and reviewed (PV)  (In-House)              Other Orders:         Screening mammogram results documented  (Send-Out)                      Plan:         Pure hypercholesterolemia        MEDICATIONS: (no change to current medication regimen)     FOLLOW-UP: Schedule a follow-up visit in 3 months.  MIPS Vaccines Flu and Pneumonia updated in Shot record Screening mammomgram done within last 2 years and results in are chart Colorectal Cancer Screening is up to date and the results are in the chart           Orders:         Screening mammogram results documented  (Send-Out)            3017F  Colorectal CA screen results documented and reviewed (PV)  (In-House)              Essential (primary) hypertension          Prescriptions:       [Refilled] hydroCHLOROthiazide 25 mg oral tablet [one a day], #90 (ninety) tablets, Refills: 1 (one)       [Refilled] lisinopriL 10 mg oral tablet [TAKE 1 TABLET BY MOUTH DAILY], #90 (ninety) tablets, Refills: 1 (one)         Other visual disturbances        ADVISED TO SEE HER EYE DOCTOR ASAP.  WE WILL TRY TO GET HER SEEN.              Other Prescriptions:       [Refilled] HYDROcodone-acetaminophen 5-325 mg oral tablet [1 PO Qdaily PRN for pain], #30 (thirty) tablets, Refills: 0 (zero)         Patient Recommendations:        For  Pure hypercholesterolemia:    Schedule a follow-up visit in 3 months.          For  Other visual disturbances:    I also recommend ADVISED TO SEE HER EYE  DOCTOR ASAP.  WE WILL TRY TO GET HER SEEN..              Charge Capture:         Primary Diagnosis:     E78.0  Pure hypercholesterolemia           Orders:      [id number]  Office/outpatient visit; established patient, level 4  (In-House)            3017F  Colorectal CA screen results documented and reviewed (PV)  (In-House)              I10  Essential (primary) hypertension     H53.8  Other visual disturbances         ADDENDUMS:      ____________________________________    Addendum: 06/29/2020 09:28 AM - Two, Team         Visit Note Faxed to:        Ranjana Miller  (Optometrist); Number [phone]

## 2021-05-18 NOTE — PROGRESS NOTES
Paige Cantu 1940     Office/Outpatient Visit    Visit Date:  02:28 pm    Provider: Reza Dougherty MD (Assistant: Maddie Bucio MA)    Location: [facility]        Electronically signed by Reza Dougherty MD on  2018 05:13:54 PM                             SUBJECTIVE:        CC:     Ms. Cantu is a 77 year old White female.  This is a follow-up visit.  CHECK UP;         HPI:         Ms. Cantu presents with chronic intermittent low back pain.  The discomfort is most prominent in the lumbar spine.  This radiates to the left buttock,  left posterior thigh, and left calf.  She characterizes it as intermittent, moderate in intensity, burning, and WAXES AND WANES.  She states that the current episode of pain started years ago.  The event which precipitated this pain was lifting.  This occurred at work.  Associated symptoms include numbness in the legs and LEGS CRAMP, MOSTLY AT NIGHT.  She denies weakness of the legs.  She notes some pain relief with narcotic pain medication, NSAIDs, muscle relaxants, USES NARCOTICS SAPARINGLY, and LIDODERM AND GABAPENTIN.  Other details: X-RAYS SHOWED SOME DJD.  HAS NOT HAD AN MRI FOR SEVERAL YEARS..      ROS:         MUSCULOSKELETAL:  Positive for back pain ( recurrent ).      NEUROLOGICAL:  Positive for paresthesia ( left lower extremity ).          PMH/FMH/SH:     Last Reviewed on 2018 03:03 PM by Reza Dougherty    Past Medical History:                 PAST MEDICAL HISTORY             GYNECOLOGICAL HISTORY:        Last mammogram was              ADVANCE DIRECTIVES: Living will         PREVENTIVE HEALTH MAINTENANCE             BONE DENSITY: was last done 7/10/15 with normal results     COLORECTAL CANCER SCREENING: Up to date (colonoscopy q10y; sigmoidoscopy q5y; Cologuard q3y) was last done 08, Results are in chart; colonoscopy with normal results     MAMMOGRAM: was last done 7/15/15 with normal results          Surgical History:         Hysterectomy      BSO; Procedures: colonoscopy 2008-NEG;;         Family History:     Unremarkable         Social History:         Marital Status:      Children: 2 children         Tobacco/Alcohol/Supplements:     Last Reviewed on 2/21/2018 03:01 PM by Reza Dougherty    Tobacco: She has never smoked.      Caffeine:  She admits to consuming caffeine via coffee ( 3 servings per day ) and tea ( 1 serving per day ).          Substance Abuse History:     Last Reviewed on 2/21/2018 03:01 PM by Reza Dougherty    NEGATIVE             Current Problems:     Last Reviewed on 2/21/2018 03:04 PM by Reza Dougherty    Chronic intermittent low back pain     Allergies     Restless legs syndrome (RLS)     HTN     High cholesterol     Varicose veins of lower extremities, with Pain         Immunizations:     Td adult 10/7/2010     Prevnar-13 8/11/2015     Fluzone (3 + years dose) 10/24/2011     Fluzone (3 + years dose) 10/26/2012     Fluzone High-Dose pf (>=65 yr) 11/19/2015     Fluzone High-Dose pf (>=65 yr) 3/14/2017     Fluzone High-Dose pf (>=65 yr) 1/29/2018     PNEUMOVAX 23 (Pneumococcal PPV23) 0/0/2006         Allergies:     Last Reviewed on 2/21/2018 03:01 PM by Reza Doughetry      No Known Drug Allergies.         Current Medications:     Last Reviewed on 2/21/2018 03:03 PM by Reza Dougherty    Lisinopril 10mg Tablet 1 tab daily     Meloxicam 15mg Tablet 1 tab daily     Hydrochlorothiazide (HCTZ) 25mg Tablet one a day     Baclofen 20mg Tablet one po Qhs     Hydrocodone/Acetaminophen 5mg/325mg Tablet 1 PO Qdaily PRN for pain     Lidoderm 5% Adhesive Patch 1 patch q 12 hours on and 12 hours off     Pravastatin 80mg Tablet Take 1 tablet(s) by mouth at bedtime     Fenugreek     abad 500mg 1 tab daily     cranberrry pills 800mg DAILY     Vitamin D3 1,000IU Tablet Take 1 tablet(s) by mouth daily     Vitamin B12     Vitamin B6     fish oil 1000mg qday          OBJECTIVE:        Vitals:         Current: 2/21/2018 2:30:16 PM    Ht:  5 ft, 2.25 in;  Wt: 137.8 lbs;  BMI: 25.0    T: 98.1 F (oral);  BP: 137/70 mm Hg (left arm, sitting);  P: 76 bpm (left arm (BP Cuff), sitting);  sCr: 0.86 mg/dL;  GFR: 51.87        Exams:     PHYSICAL EXAM:     GENERAL: vital signs recorded - well developed, well nourished;  no apparent distress;     NEUROLOGIC: GROSSLY INTACT     PSYCHIATRIC:  appropriate affect and demeanor; normal speech pattern; grossly normal memory; LOW BACK examination: Inspection: normal;     Palpation: lumbar tenderness;     Neuro-vascular: normal;     Muscular Strength: normal;     Range of Motion: LAROM;     Maneuvers: (+) left straight leg raise.              ASSESSMENT           724.2   M54.17  Chronic intermittent low back pain              DDx:         ORDERS:         Meds Prescribed:       Refill of: Hydrocodone/Acetaminophen 5mg/325mg Tablet 1 PO Qdaily PRN for pain  #30 (Thirty) tablet(s) Refills: 0                 PLAN:          Chronic intermittent low back pain           Prescriptions:       Refill of: Hydrocodone/Acetaminophen 5mg/325mg Tablet 1 PO Qdaily PRN for pain  #30 (Thirty) tablet(s) Refills: 0             CHARGE CAPTURE           **Please note: ICD descriptions below are intended for billing purposes only and may not represent clinical diagnoses**        Primary Diagnosis:         724.2 Chronic intermittent low back pain            M54.17    Radiculopathy, lumbosacral region              Orders:          [id number]   Office/outpatient visit; established patient, level 3  (In-House)

## 2021-05-18 NOTE — PROGRESS NOTES
Paige Cantu 1940     Office/Outpatient Visit    Visit Date: Wed, Aug 28, 2019 01:39 pm    Provider: Reza Dougherty MD (Assistant: Sharon Cavazos MA)    Location: [facility]        Electronically signed by Reza Dougherty MD on  08/30/2019 06:27:57 PM                             SUBJECTIVE:        CC: (NOT USING GENTAMICIN)     Ms. Cantu is a 78 year old White female.  This is a follow-up visit.  MCW;         HPI:         Ms. Cantu is here for a Medicare wellness visit.  The required HRA questions are integrated within this visit note. Family medical history and individual medical/surgical history were reviewed and updated.  A current height, weight, BMI, blood pressure, and pulse were recorded in the vitals section of the note and have been reviewed. Patient's medications, including supplements, were recorded in the chart and reviewed.  Current providers and suppliers were reviewed and updated.          Self-Assessment of Health:. She rates her confidence of being able to control/manage most of her health problems as very confident. Her physical/emotional health has limited her social activites not at all.  A review of possible cognitive impairment was performed and the following was noted: Memory Impairment Screen is normal.  A review of functional ability, including bathing, dressing, walking, and urine/bowel continence as well as level of safety was performed and was found to be negative.  Falls Risk: Has not had any falls or only one fall without injury in the past year.  She denies having trouble hearing the TV/radio when others do not, having to strain to hear or understand conversations and wearing hearing aid(s).  Concerning home safety, she reports that at home she DOES have adequate lighting, a skid resistant shower/tub, grab bars in the bath, handrails on stairs, functioning smoke alarms and absence of throw rugs.          Immunization Status: ** >10 years since last Td booster;  Age>60, no shingles vaccination; Physical Activity: She exercises for at least 20 minutes 3 or more days/week.; Type of diet patient normally eats is described as well-balanced with fruits and vegetables Preventative Health updated today         PHQ-9 Depression Screening: Completed form scanned and in chart; Total Score 0 Alcohol Consumption Screening: Completed form scanned and in chart; Total Score 1     ROS:     CONSTITUTIONAL:  Negative for chills and fever.      EYES:  Negative for blurred vision and eye drainage.      E/N/T:  Negative for ear pain, diminished hearing, frequent rhinorrhea and sore throat.      CARDIOVASCULAR:  Negative for chest pain, palpitations and pedal edema.      RESPIRATORY:  Negative for recent cough and dyspnea.      GASTROINTESTINAL:  Negative for abdominal pain, anorexia, constipation, diarrhea, nausea and vomiting.      GENITOURINARY:  Negative for dysuria and hematuria.      MUSCULOSKELETAL:  Positive for back pain ( chronic; INTERMITTENT ).      INTEGUMENTARY/BREAST:  Negative for breast mass and breast tenderness.      NEUROLOGICAL:  Negative for headaches and paresthesias.          PMH/FMH/SH:     Last Reviewed on 2019 01:46 PM by Reza Dougherty    Past Medical History:                 PAST MEDICAL HISTORY             GYNECOLOGICAL HISTORY:                 ADVANCE DIRECTIVES: Living will         PREVENTIVE HEALTH MAINTENANCE             BONE DENSITY: was last done 2018 with normal results     COLORECTAL CANCER SCREENING: Up to date (colonoscopy q10y; sigmoidoscopy q5y; Cologuard q3y) was last done 08, Results are in chart; colonoscopy with normal results     Hepatitis C Medicare Screening: was last done      MAMMOGRAM: Done within last 2 years and results in are chart was last done 2018 normal         Surgical History:         Hysterectomy      BSO;         Family History:     Unremarkable         Social History:         Marital Status:       Children: 2 children         Tobacco/Alcohol/Supplements:     Last Reviewed on 8/28/2019 01:43 PM by Reza Dougherty    Tobacco: She has never smoked.      Caffeine:  She admits to consuming caffeine via coffee ( 3 servings per day ) and tea ( 1 serving per day ).          Substance Abuse History:     Last Reviewed on 8/28/2019 01:43 PM by Reza Dougherty    NEGATIVE             Current Problems:     Last Reviewed on 8/28/2019 01:49 PM by Reza Dougherty    Chronic intermittent low back pain     Allergies     Restless legs syndrome (RLS)     HTN     High cholesterol     Varicose veins of lower extremities, with Pain     Screening for depression         Immunizations:     Td adult 10/7/2010     zzPrevnar-13 8/11/2015     Fluzone (3 + years dose) 10/24/2011     Fluzone (3 + years dose) 10/26/2012     Fluzone High-Dose pf (>=65 yr) 11/19/2015     Fluzone High-Dose pf (>=65 yr) 3/14/2017     Fluzone High-Dose pf (>=65 yr) 1/29/2018     Fluzone High-Dose pf (>=65 yr) 12/28/2018     PNEUMOVAX 23 (Pneumococcal PPV23) 0/0/2006         Allergies:     Last Reviewed on 8/28/2019 01:43 PM by Reza Dougherty      No Known Drug Allergies.         Current Medications:     Last Reviewed on 8/28/2019 01:49 PM by Reza Dougherty    Hydrochlorothiazide (HCTZ) 25mg Tablet one a day     Lisinopril 10mg Tablet 1 tab daily     Hydrocodone/Acetaminophen 5mg/325mg Tablet 1 PO Qdaily PRN for pain     Pravastatin 80mg Tablet Take 1 tablet(s) by mouth at bedtime     Meloxicam 15mg Tablet 1 tab daily     Baclofen 20mg Tablet one po Qhs     Lidoderm 5% Adhesive Patch 1 patch q 12 hours on and 12 hours off     Gentamicin Sulfate 3mg/1gm Ophthalmic Ointment     Fenugreek     abad 500mg 1 tab daily     cranberrry pills 800mg DAILY     Vitamin D3 1,000IU Tablet Take 1 tablet(s) by mouth daily     Vitamin B12     Vitamin B6         OBJECTIVE:        Vitals:         Historical: DID NOT BRING HER  GLASSES         Current: 8/28/2019 1:45:42 PM    Ht:  5 ft, 2.25 in;  Wt: 137.2 lbs;  BMI: 24.9    T: 98.5 F (oral);  BP: 142/58 mm Hg (left arm, sitting);  P: 71 bpm (left arm (BP Cuff), sitting);  sCr: 0.99 mg/dL;  GFR: 44.29    VA: 20/70 OD, 20/70 OS (without correction)        Exams:     PHYSICAL EXAM:     GENERAL: vital signs recorded - well developed, well nourished;  no apparent distress;     EYES: conjunctiva and cornea are normal;     E/N/T:  normal EACs, TMs, nasal/oral mucosa, teeth, gingiva, and oropharynx;     NECK: trachea is midline; thyroid is non-palpable; carotid exam reveals no bruits;     RESPIRATORY: normal respiratory rate and pattern with no distress; normal breath sounds with no rales, rhonchi, wheezes or rubs;     CARDIOVASCULAR: normal rate; rhythm is regular;  no systolic murmur; no edema;     GASTROINTESTINAL: nontender;     LYMPHATIC: no enlargement of cervical or facial nodes; no supraclavicular nodes;     MUSCULOSKELETAL: normal gait; normal overall tone     NEUROLOGIC: GROSSLY INTACT     PSYCHIATRIC:  appropriate affect and demeanor; normal speech pattern; grossly normal memory;         ASSESSMENT           V70.0   Z00.00  Health checkup              DDx:     272.0   E78.0  High cholesterol              DDx:     401.1   I10  HTN              DDx:     V76.51   Z12.11  Screening for cancer of colon              DDx:     V79.0   Z13.89  Screening for depression              DDx:         ORDERS:         Meds Prescribed:       Refill of: Lisinopril 10mg Tablet 1 tab daily  #90 (Ninety) tablet(s) Refills: 1         Radiology/Test Orders:       3014F  Screening mammography results documented and reviewed (PV)1  (In-House)         3017F  Colorectal CA screen results documented and reviewed (PV)  (In-House)           Lab Orders:       77650  COMP - [facility] Comp. Metabolic Panel  (Send-Out)         08554  LPDP - [facility] Lipid Panel  (Send-Out)         63120  [facility] - [facility] CBC with 3 part diff  (Send-Out)          19587  [facility] TSH  (Send-Out)           Procedures Ordered:         Annual wellness visit, includes a PPPS, subsequent visit  (In-House)         REFER  Referral to Specialist or Other Facility  (Send-Out)           Other Orders:         Depression screen negative  (In-House)         1101F  Pt screen for fall risk; document no falls in past year or only 1 fall w/o injury in past year (JUANA)  (In-House)           Negative EtOH screen  (In-House)                   PLAN:          Health checkup         COUNSELING provided on: fall prevention, healthy eating habits, Medicare Preventative Services Guide given to patient., regular exercise, use of seat belts, and ADVISED TO SEE AN EYE DOCTOR AND DENTIST REGULARLY.      FOLLOW-UP: Schedule a follow-up visit in 6 months.  MIPS Negative Depression Screen Negative alcohol screen           Orders:         Depression screen negative  (In-House)         1101F  Pt screen for fall risk; document no falls in past year or only 1 fall w/o injury in past year (JUANA)  (In-House)           Negative EtOH screen  (In-House)         3014F  Screening mammography results documented and reviewed (PV)1  (In-House)         3017F  Colorectal CA screen results documented and reviewed (PV)  (In-House)           Annual wellness visit, includes a PPPS, subsequent visit  (In-House)            High cholesterol     LABORATORY:  Labs ordered to be performed today include Comprehensive metabolic panel and lipid panel.            Orders:       27486  COMP - [facility] Comp. Metabolic Panel  (Send-Out)         99545  DP [facility] Lipid Panel  (Send-Out)            HTN     LABORATORY:  Labs ordered to be performed today include CBC and TSH.            Prescriptions:       Refill of: Lisinopril 10mg Tablet 1 tab daily  #90 (Ninety) tablet(s) Refills: 1           Orders:       21326  [facility] CBC with 3 part diff  (Send-Out)         44656  TSH [facility] TSH  (Send-Out)             Screening for cancer of colon         REFERRALS:  Referral initiated to a general surgeon ( Dr. Favio Alvarez; a colonoscopy ) and PATIENT PREFERENCE.            Orders:       REFER  Referral to Specialist or Other Facility  (Send-Out)               Patient Recommendations:        For  Health checkup:         Regularly exercise within recommended guidelines, especially to maintain balance. Remove obstacles in walkways at home.  Use non-skid material for bathtub safety.  Remove loose throw rugs from floor. Use nightlights in bedrooms, hallways, and bathrooms.    Limit dietary intake of fat (especially saturated fat) and cholesterol.  Eat a variety of foods, including plenty of fruits, vegetables, and grain containg fiber, limit fat intake to 30% of total calories. Balance caloric intake with energy expended.    Maintaining regular physical activity is advised to help prevent heart disease, hypertension, diabetes, and obesity.    Always use shoulder/lap restraints when driving or riding in a vehicle, even those equipped with air bags.  Schedule a follow-up visit in 6 months.              CHARGE CAPTURE           **Please note: ICD descriptions below are intended for billing purposes only and may not represent clinical diagnoses**        Primary Diagnosis:         V70.0 Health checkup            Z00.00    Encounter for general adult medical examination without abnormal findings              Orders:             Depression screen negative  (In-House)             1101F   Pt screen for fall risk; document no falls in past year or only 1 fall w/o injury in past year (JUANA)  (In-House)                Negative EtOH screen  (In-House)             3014F   Screening mammography results documented and reviewed (PV)1  (In-House)             3017F   Colorectal CA screen results documented and reviewed (PV)  (In-House)                Annual wellness visit, includes a PPPS, subsequent visit  (In-House)           272.0  High cholesterol            E78.0    Pure hypercholesterolemia    401.1 HTN            I10    Essential (primary) hypertension    V76.51 Screening for cancer of colon            Z12.11    Encounter for screening for malignant neoplasm of colon    V79.0 Screening for depression            Z13.89    Encounter for screening for other disorder

## 2021-05-18 NOTE — PROGRESS NOTES
Paige Cantu 1940     Office/Outpatient Visit    Visit Date: Mon, Sep 30, 2019 02:34 pm    Provider: Reza Dougherty MD (Assistant: Nicolasa Bailey MA)    Location: [facility]        Electronically signed by Reaz Dougherty MD on  2019 08:36:16 PM                             SUBJECTIVE:        CC:     Ms. Cantu is a 78 year old White female.  This is a follow-up visit.  This visit is covered under Worker's Compensation.          HPI:         Ms. Cantu presents with chronic intermittent low back pain.  The discomfort is most prominent in the lumbar spine.  This radiates to the left buttock,  left posterior thigh, and left calf.  She characterizes it as intermittent, moderate in intensity, burning, and WAXES AND WANES.  She states that the current episode of pain started years ago.  The event which precipitated this pain was lifting.  This occurred at work.  Associated symptoms include numbness in the legs and LEGS CRAMP, MOSTLY AT NIGHT.  She denies weakness of the legs.  She notes some pain relief with narcotic pain medication, NSAIDs, muscle relaxants, USES NARCOTICS SAPARINGLY, and LIDODERM.  Other details: X-RAYS SHOWED SOME DJD.  HAS NOT HAD AN MRI FOR SEVERAL YEARS..      ROS:         MUSCULOSKELETAL:  Positive for back pain ( recurrent ).      NEUROLOGICAL:  Positive for paresthesia ( left lower extremity ).          PMH/FMH/SH:     Last Reviewed on 2019 02:47 PM by Reza Dougherty    Past Medical History:                 PAST MEDICAL HISTORY             GYNECOLOGICAL HISTORY:                 ADVANCE DIRECTIVES: Living will         PREVENTIVE HEALTH MAINTENANCE             BONE DENSITY: was last done 2018 with normal results     COLORECTAL CANCER SCREENING: Up to date (colonoscopy q10y; sigmoidoscopy q5y; Cologuard q3y) was last done 08, Results are in chart; colonoscopy with normal results     Hepatitis C Medicare Screening: was last done      MAMMOGRAM:  Done within last 2 years and results in are chart was last done 06- normal         Surgical History:         Hysterectomy      BSO;         Family History:     Unremarkable         Social History:         Marital Status:      Children: 2 children         Tobacco/Alcohol/Supplements:     Last Reviewed on 9/30/2019 02:46 PM by Reaz Dougherty    Tobacco: She has never smoked.      Caffeine:  She admits to consuming caffeine via coffee ( 3 servings per day ) and tea ( 1 serving per day ).          Substance Abuse History:     Last Reviewed on 9/30/2019 02:46 PM by Reza Dougherty    NEGATIVE             Current Problems:     Last Reviewed on 9/30/2019 02:48 PM by Reza Dougherty    Chronic intermittent low back pain     Allergies     Restless legs syndrome (RLS)     HTN     High cholesterol     Varicose veins of lower extremities, with Pain         Immunizations:     Td adult 10/7/2010     zzPrevnar-13 8/11/2015     Fluzone (3 + years dose) 10/24/2011     Fluzone (3 + years dose) 10/26/2012     Fluzone High-Dose pf (>=65 yr) 11/19/2015     Fluzone High-Dose pf (>=65 yr) 3/14/2017     Fluzone High-Dose pf (>=65 yr) 1/29/2018     Fluzone High-Dose pf (>=65 yr) 12/28/2018     PNEUMOVAX 23 (Pneumococcal PPV23) 0/0/2006         Allergies:     Last Reviewed on 9/30/2019 02:46 PM by Reza Dougherty      No Known Drug Allergies.         Current Medications:     Last Reviewed on 9/30/2019 02:47 PM by Reza Dougherty    Lisinopril 10mg Tablet 1 tab daily     Hydrochlorothiazide (HCTZ) 25mg Tablet one a day     Hydrocodone/Acetaminophen 5mg/325mg Tablet 1 PO Qdaily PRN for pain     Pravastatin 80mg Tablet Take 1 tablet(s) by mouth at bedtime     Meloxicam 15mg Tablet 1 tab daily     Baclofen 20mg Tablet one po Qhs     Lidoderm 5% Adhesive Patch 1 patch q 12 hours on and 12 hours off     Fenugreek     abad 500mg 1 tab daily     cranberrry pills 800mg DAILY     Vitamin D3  1,000IU Tablet Take 1 tablet(s) by mouth daily     Vitamin B12     Vitamin B6         OBJECTIVE:        Vitals:         Current: 9/30/2019 2:40:27 PM    Ht:  5 ft, 2.25 in;  Wt: 138.8 lbs;  BMI: 25.2    T: 98.2 F (oral);  BP: 139/62 mm Hg (left arm, sitting);  P: 83 bpm (left arm (BP Cuff), sitting);  sCr: 0.86 mg/dL;  GFR: 51.24        Exams:     PHYSICAL EXAM:     GENERAL: vital signs recorded - well developed, well nourished;  no apparent distress;     NEUROLOGIC: GROSSLY INTACT     PSYCHIATRIC:  appropriate affect and demeanor; normal speech pattern; grossly normal memory; LOW BACK examination: Inspection: normal;     Palpation: lumbar tenderness;     Neuro-vascular: normal;     Muscular Strength: normal;     Range of Motion: LAROM;     Maneuvers: (+) left straight leg raise.              ASSESSMENT           724.2   M54.17  Chronic intermittent low back pain              DDx:         ORDERS:         Meds Prescribed:       Refill of: Hydrocodone/Acetaminophen (Hydrocodone/Acetaminophen)  5mg/325mg Tablet 1 PO Qdaily PRN for pain  #30 (Thirty) tablet(s) Refills: 0         Lab Orders:       APPTO  Appointment need  (In-House)                   PLAN:          Chronic intermittent low back pain         FOLLOW-UP: Schedule a follow-up visit in 3 months.:.            Prescriptions:       Refill of: Hydrocodone/Acetaminophen (Hydrocodone/Acetaminophen)  5mg/325mg Tablet 1 PO Qdaily PRN for pain  #30 (Thirty) tablet(s) Refills: 0           Orders:       APPTO  Appointment need  (In-House)               Patient Recommendations:        For  Chronic intermittent low back pain:     Schedule a follow-up visit in 3 months.                APPOINTMENT INFORMATION:        Monday Tuesday Wednesday Thursday Friday Saturday Sunday            Time:___________________AM  PM   Date:_____________________             CHARGE CAPTURE           **Please note: ICD descriptions below are intended for billing purposes only and may not  represent clinical diagnoses**        Primary Diagnosis:         724.2 Chronic intermittent low back pain            M54.17    Radiculopathy, lumbosacral region              Orders:          27271   Office/outpatient visit; established patient, level 3  (In-House)             APPTO   Appointment need  (In-House)

## 2021-05-18 NOTE — PROGRESS NOTES
Paige Cantu  1940     Office/Outpatient Visit    Visit Date: Sat 10:22 am    Provider: Elysia Gomez MD (Assistant: Bryce Zepeda, )    Location: [facility]        Electronically signed by Elysia Gomez MD on  02/10/2020 01:23:57 PM                             Subjective:        CC: dysuria    HPI:           Urinary tract infection, site not specified noted.  This began within the last 3 days.  Associated symptoms include urgency, urinary frequency, burning with urination and frequency and urgency of urination.  She denies associated abdominal pain, chills, fever, flank pain, nausea, urinary incontinence or vaginal discharge.  Ms. Cantu states that she had multiple prior episodes of similar discomfort.  Medical history is pertinent for recurrent UTIs.  Treatments tried thus far include urostat and Increasing fluid intake.  Treatment effectivenss has been generally ineffective.      ROS:     CONSTITUTIONAL:  Negative for chills, fatigue and fever.      CARDIOVASCULAR:  Negative for chest pain, palpitations, tachycardia, orthopnea, and edema.      RESPIRATORY:  Negative for cough, dyspnea, and hemoptysis.      GASTROINTESTINAL:  Negative for abdominal pain, heartburn, constipation, diarrhea, and stool changes.          Past Medical History / Family History / Social History:         Last Reviewed on 2020 10:48 AM by Elysia Gomez    Past Medical History:                 PAST MEDICAL HISTORY             GYNECOLOGICAL HISTORY:                 ADVANCE DIRECTIVES: Living will         PREVENTIVE HEALTH MAINTENANCE             BONE DENSITY: was last done 2018 with normal results     COLORECTAL CANCER SCREENING: Up to date (colonoscopy q10y; sigmoidoscopy q5y; Cologuard q3y) was last done , Results are in chart; colonoscopy with normal results     Hepatitis C Medicare Screening: was last done      MAMMOGRAM: Done within last 2 years and results in are  chart was last done 06- normal         Surgical History:         Hysterectomy     BSO;         Family History:     Unremarkable         Social History:         Marital Status:      Children: 2 children         Tobacco/Alcohol/Supplements:     Last Reviewed on 2/08/2020 10:24 AM by Bryce Zepeda    Tobacco: She has never smoked.      Caffeine:  She admits to consuming caffeine via coffee ( 3 servings per day ) and tea ( 1 serving per day ).          Substance Abuse History:     Last Reviewed on 12/18/2019 02:15 PM by Reza Dougherty    NEGATIVE         Allergies:     Last Reviewed on 12/18/2019 02:15 PM by Reza Dougherty    No Known Allergies.        Current Medications:     Last Reviewed on 12/18/2019 02:15 PM by Reza Dougherty    hydroCHLOROthiazide 25 mg oral tablet [one a day]    Vitamin B6     Vitamin B12     cranberrry pills 800mg DAILY     Vitamin D3 25 mcg (1,000 unit) oral tablet [Take 1 tablet(s) by mouth daily]    Pravastatin 80mg Tablet [Take 1 tablet(s) by mouth at bedtime]    abad 500mg 1 tab daily      Lidoderm 5% Adhesive Patch [1 patch q 12 hours on and 12 hours off]    Meloxicam 15mg Tablet [1 tab daily]    Baclofen 20mg Tablet [one po Qhs]    Lisinopril 10 mg oral tablet [1 tab daily]    Fenugreek     HYDROcodone-acetaminophen 5-325 mg oral tablet [1 PO Qdaily PRN for pain]        Objective:        Vitals:         Current: 2/8/2020 10:27:48 AM    Ht:  5 ft, 2.25 in;  Wt: 143.6 lbs;  BMI: 26.1T: 98.6 F (oral);  BP: 136/76 mm Hg (left arm, sitting);  P: 83 bpm (left arm (BP Cuff), sitting);  sCr: 0.86 mg/dL;  GFR: 51.17        Exams:     PHYSICAL EXAM:     GENERAL: vital signs recorded - well developed, well nourished;  no apparent distress;     E/N/T:  normal EACs, TMs, nasal/oral mucosa, teeth, gingiva, and oropharynx;     RESPIRATORY: normal respiratory rate and pattern with no distress; normal breath sounds with no rales, rhonchi, wheezes or rubs;      CARDIOVASCULAR: normal rate; rhythm is regular;  no systolic murmur; no edema;     GASTROINTESTINAL: nontender; no CVT        Lab/Test Results:         collection source: Clean Catch (NA) (02/08/2020),     Color: Dark yellow (NA) (02/08/2020),     Appearance: Slightly Cloudy (NA) (02/08/2020),     Specific Gravity, urine: 1.020 (NA) (02/08/2020),     pH, urine: 7.0 (pH UNIT) (02/08/2020),     Protein urine screen: NEGATIVE (mg/dl) (02/08/2020),     Glucose, Urine: NEGATIVE (mg/dl) (02/08/2020),     Ketones, Urine Strip: NEGATIVE (mg/dl) (02/08/2020),     Bilirubin, urine: NEGATIVE (NA) (02/08/2020),     blood urine: Trace-intact (NA) (02/08/2020),     Nitrite, Urine: POSITIVE (NA) (02/08/2020),     Leukoctyes, urine: SMALL (NA) (02/08/2020),     WBC, urine: MANY (21-50) (/HPF) (02/08/2020),     RBC, urine: OCC (2-5) (/HPF) (02/08/2020),     Epithelial Cells: 2-5 Squamous (/HPF) (02/08/2020),     Bacteria: 3+ (NA) (02/08/2020),             Assessment:         R30.0   Dysuria       N39.0   Urinary tract infection, site not specified       Z13.31   Encounter for screening for depression           ORDERS:         Meds Prescribed:       [Recorded] ciprofloxacin HCl 500 mg oral tablet [take 1 tablet (500 mg) by oral route 2 times per day]       [Refilled] ciprofloxacin HCl 500 mg oral tablet [take 1 tablet (500 mg) by oral route 2 times per day X 3 days], #6 (six) tablets, Refills: 0 (zero)         Lab Orders:       [id number]  [facility] - [facility] Urinalysis, automated, with micro  (Send-Out)              Other Orders:         Depression screen negative  (In-House)                      Plan:         Dysuriawill treat for UTI          Orders:       [id number]  [facility] - [facility] Urinalysis, automated, with micro  (Send-Out)              Urinary tract infection, site not specified          Prescriptions:       [Recorded] ciprofloxacin HCl 500 mg oral tablet [take 1 tablet (500 mg) by oral route 2 times per day]       [Refilled] ciprofloxacin  HCl 500 mg oral tablet [take 1 tablet (500 mg) by oral route 2 times per day X 3 days], #6 (six) tablets, Refills: 0 (zero)         Encounter for screening for depression    MIPS PHQ-9 Depression Screening: Completed form scanned and in chart; Total Score 1; Negative Depression Screen           Orders:         Depression screen negative  (In-House)                  Charge Capture:         Primary Diagnosis:     R30.0  Dysuria           Orders:      [id number]  Office/outpatient visit; established patient, level 3  (In-House)              N39.0  Urinary tract infection, site not specified     Z13.31  Encounter for screening for depression           Orders:        Depression screen negative  (In-House)

## 2021-05-18 NOTE — PROGRESS NOTES
Paige Cantu  1940     Office/Outpatient Visit    Visit Date: Thu, Sep 24, 2020 02:46 pm    Provider: Reza Dougherty MD (Assistant: Sharon Cavazos MA)    Location: [facility]        Electronically signed by Reza Dougherty MD on  09/24/2020 03:33:42 PM                             Subjective:        CC: Ms. Cantu is a 79 year old White female.  This is a follow-up visit.  check up, pt wants to wait on flu vaccine;         HPI:           PHQ-9 Depression Screening: Completed form scanned and in chart; Total Score 0           Additionally, she presents with history of radiculopathy, lumbosacral region.  the discomfort is most prominent in the lumbar spine.  This radiates to the left buttock,  left posterior thigh, and left calf.  She characterizes it as intermittent, moderate in intensity, burning, and WAXES AND WANES.  She states that the current episode of pain started years ago.  The event which precipitated this pain was lifting.  This occurred at work.  Associated symptoms include numbness in the legs and LEGS CRAMP, MOSTLY AT NIGHT.  She denies weakness of the legs.  She notes some pain relief with narcotic pain medication, NSAIDs, muscle relaxants, and LIDODERM.  Other details: X-RAYS SHOWED SOME DJD.  HAS NOT HAD AN MRI FOR SEVERAL YEARS..      ROS:     CONSTITUTIONAL:  Negative for chills, fatigue and fever.      E/N/T:  Negative for ear pain, diminished hearing, frequent rhinorrhea and sore throat.      CARDIOVASCULAR:  Negative for chest pain, palpitations and pedal edema.      RESPIRATORY:  Negative for recent cough and dyspnea.      GASTROINTESTINAL:  Negative for abdominal pain, anorexia, nausea and vomiting.      MUSCULOSKELETAL:  Negative for myalgias.      NEUROLOGICAL:  Negative for headaches, paresthesias and RECENT FALLING.          Past Medical History / Family History / Social History:         Last Reviewed on 9/24/2020 03:26 PM by Reza Dougherty     Past Medical History:                 PAST MEDICAL HISTORY             GYNECOLOGICAL HISTORY:                 ADVANCE DIRECTIVES: Living will         PREVENTIVE HEALTH MAINTENANCE             BONE DENSITY: was last done 2018 with normal results     COLORECTAL CANCER SCREENING: Up to date (colonoscopy q10y; sigmoidoscopy q5y; Cologuard q3y) was last done , Results are in chart; colonoscopy with normal results     DENTAL CLEANING: has full dentures     EYE EXAM: was last done      Hepatitis C Medicare Screening: was last done      MAMMOGRAM: Done within last 2 years and results in are chart was last done 19 with normal results     PAP SMEAR: No longer indicated due to age and history         Surgical History:         Hysterectomy     BSO;         Family History:     Unremarkable         Social History:         Marital Status:      Children: 2 children         Tobacco/Alcohol/Supplements:     Last Reviewed on 2020 03:26 PM by Reza Dougherty    Tobacco: She has never smoked.      Caffeine:  She admits to consuming caffeine via coffee ( 3 servings per day ) and tea ( 1 serving per day ).          Substance Abuse History:     Last Reviewed on 2020 03:26 PM by Reza Dougherty    NEGATIVE         Current Problems:     Last Reviewed on 2020 03:26 PM by Reza Dougherty    Pure hypercholesterolemia    Essential (primary) hypertension    Varicose veins of lower extremities, with Pain    Allergies    Radiculopathy, lumbosacral region    Other long term (current) drug therapy    Encounter for screening for depression        Immunizations:     Td adult 10/7/2010    zzPrevnar-13 2015    Fluzone (3 + years dose) 10/24/2011    Fluzone (3 + years dose) 10/26/2012    Fluzone High-Dose pf (>=65 yr) 2015    Fluzone High-Dose pf (>=65 yr) 3/14/2017    Fluzone High-Dose pf (>=65 yr) 2018    Fluzone High-Dose pf (>=65 yr) 2018    Fluzone  High-Dose pf (>=65 yr) 11/4/2019    PNEUMOVAX 23 (Pneumococcal PPV23) 0/0/2006        Allergies:     Last Reviewed on 9/24/2020 03:26 PM by Reza Dougherty    No Known Allergies.        Current Medications:     Last Reviewed on 9/24/2020 03:26 PM by Reza Dougherty    hydroCHLOROthiazide 25 mg oral tablet [one a day]    Vitamin B6     Vitamin B12     cranberrry pills 800mg DAILY     Vitamin D3 25 mcg (1,000 unit) oral tablet [Take 1 tablet(s) by mouth daily]    Pravastatin 80mg Tablet [Take 1 tablet(s) by mouth at bedtime]    abad 500mg 1 tab daily      Lidoderm 5% Adhesive Patch [1 patch q 12 hours on and 12 hours off]    Meloxicam 15mg Tablet [1 tab daily]    Baclofen 20mg Tablet [one po Qhs]    lisinopriL 10 mg oral tablet [TAKE 1 TABLET BY MOUTH DAILY]    Fenugreek     HYDROcodone-acetaminophen 5-325 mg oral tablet [1 PO Qdaily PRN for pain]        Objective:        Vitals:         Current: 9/24/2020 2:57:26 PM    Ht:  5 ft, 2.25 in;  Wt: 144.2 lbs;  BMI: 26.2T: 97.6 F (temporal);  BP: 151/65 mm Hg (left arm, sitting);  P: 83 bpm (left arm (BP Cuff), sitting);  sCr: 0.86 mg/dL;  GFR: 51.27        Exams:     PHYSICAL EXAM:     GENERAL: vital signs recorded - well developed, well nourished;  no apparent distress;     RESPIRATORY: normal respiratory rate and pattern with no distress; normal breath sounds with no rales, rhonchi, wheezes or rubs;     CARDIOVASCULAR: normal rate; rhythm is regular;     NEUROLOGIC: GROSSLY INTACT     PSYCHIATRIC:  appropriate affect and demeanor; normal speech pattern; grossly normal memory; LOW BACK examination: Inspection: normal;     Palpation: lumbar tenderness;     Neuro-vascular: normal;     Muscular Strength: normal;     Range of Motion: LAROM;     Maneuvers: (+) left straight leg raise.              Assessment:         M54.17   Radiculopathy, lumbosacral region       Z13.31   Encounter for screening for depression           ORDERS:         Radiology/Test Orders:        3017F  Colorectal CA screen results documented and reviewed (PV)  (In-House)              Lab Orders:       APPTO  Appointment need  (In-House)              Other Orders:         Depression screen negative  (In-House)            1101F  Pt screen for fall risk; document no falls in past year or only 1 fall w/o injury in past year (JUANA)  (In-House)              Screening mammogram results documented  (Send-Out)                      Plan:         Radiculopathy, lumbosacral region        FOLLOW-UP: Schedule a follow-up visit in 3 months.:.:for Medicare Wellness Visit Controlled substance documentation: Sidney reviewed; prior drug screen consistent; consent is reviewed and signed and on the chart.  She is aware of risk of addiction on this medication, understands that she will need to follow up for a review every 3 months and her medications will be adjusted or decreased as deemed appropriate at each visit.  No history of drug or alcohol abuse.  No concerns about diversion or abuse. She denies side effects related to the medication.  She is aware that she may be called in for pill counts.  The dosing of this medication will be reviewed on a regular basis and reduced if possible..  Ongoing use of a controlled substance is necessary for this patient to have a normal quality of life           Orders:       APPTO  Appointment need  (In-House)              Encounter for screening for depression        RECOMMENDATIONS given include: need for yearly flu shots.  MIPS Negative Depression Screen           Orders:         Depression screen negative  (In-House)            1101F  Pt screen for fall risk; document no falls in past year or only 1 fall w/o injury in past year (JUANA)  (In-House)              Screening mammogram results documented  (Send-Out)            3017F  Colorectal CA screen results documented and reviewed (PV)  (In-House)                  Patient Recommendations:        For  Radiculopathy,  lumbosacral region:    Schedule a follow-up visit in 3 months.                APPOINTMENT INFORMATION:        Monday Tuesday Wednesday Thursday Friday Saturday Sunday            Time:___________________AM  PM   Date:_____________________         For  Encounter for screening for depression:    Obtain a flu shot each year.              Charge Capture:         Primary Diagnosis:     M54.17  Radiculopathy, lumbosacral region           Orders:      [id number]  Office/outpatient visit; established patient, level 3  (In-House)            APPTO  Appointment need  (In-House)              Z13.31  Encounter for screening for depression           Orders:        Depression screen negative  (In-House)            1101F  Pt screen for fall risk; document no falls in past year or only 1 fall w/o injury in past year (JUANA)  (In-House)            3017F  Colorectal CA screen results documented and reviewed (PV)  (In-House)

## 2021-05-18 NOTE — PROGRESS NOTES
Paige Cantu 1940     Office/Outpatient Visit    Visit Date: Tue, May 28, 2019 03:20 pm    Provider: Reza Dougherty MD (Assistant: Juana Hagen)    Location: [facility]        Electronically signed by Reza Dougherty MD on  2019 05:17:09 PM                             SUBJECTIVE:        CC:     Ms. Cantu is a 78 year old White female.  This is a follow-up visit.  This visit is covered under Worker's Compensation.  Follow up on work related back injury;         HPI:         Patient presents with screening for depression.          PHQ-9 Depression Screening: Completed form scanned and in chart; Total Score 1 Alcohol Consumption Screening: Completed form scanned and in chart; Total Score 3         With regard to the chronic intermittent low back pain, the discomfort is most prominent in the lumbar spine.  This radiates to the left buttock,  left posterior thigh, and left calf.  She characterizes it as intermittent, moderate in intensity, burning, and WAXES AND WANES.  She states that the current episode of pain started years ago.  The event which precipitated this pain was lifting.  This occurred at work.  Associated symptoms include numbness in the legs and LEGS CRAMP, MOSTLY AT NIGHT.  She denies weakness of the legs.  She notes some pain relief with narcotic pain medication, NSAIDs, muscle relaxants, USES NARCOTICS SAPARINGLY, and LIDODERM.  Other details: X-RAYS SHOWED SOME DJD.  HAS NOT HAD AN MRI FOR SEVERAL YEARS..      ROS:         MUSCULOSKELETAL:  Positive for back pain ( recurrent ).      NEUROLOGICAL:  Positive for paresthesia ( left lower extremity ).          PMH/FMH/SH:     Last Reviewed on 2019 05:02 PM by Reza Dougherty    Past Medical History:                 PAST MEDICAL HISTORY             GYNECOLOGICAL HISTORY:                 ADVANCE DIRECTIVES: Living will         PREVENTIVE HEALTH MAINTENANCE             BONE DENSITY: was last done 2018  with normal results     COLORECTAL CANCER SCREENING: Up to date (colonoscopy q10y; sigmoidoscopy q5y; Cologuard q3y) was last done 9/7/08, Results are in chart; colonoscopy with normal results     Hepatitis C Medicare Screening: was last done 2018     MAMMOGRAM: Done within last 2 years and results in are chart was last done 06- normal         Surgical History:         Hysterectomy      BSO;         Family History:     Unremarkable         Social History:         Marital Status:      Children: 2 children         Tobacco/Alcohol/Supplements:     Last Reviewed on 6/01/2019 05:01 PM by Reza Dougherty    Tobacco: She has never smoked.      Caffeine:  She admits to consuming caffeine via coffee ( 3 servings per day ) and tea ( 1 serving per day ).          Substance Abuse History:     Last Reviewed on 6/01/2019 05:01 PM by Reza Dougherty    NEGATIVE             Current Problems:     Last Reviewed on 6/01/2019 05:04 PM by Reza Dougherty    Chronic intermittent low back pain     Allergies     Restless legs syndrome (RLS)     HTN     High cholesterol     Varicose veins of lower extremities, with Pain     Screening for depression         Immunizations:     Td adult 10/7/2010     zzPrevnar-13 8/11/2015     Fluzone (3 + years dose) 10/24/2011     Fluzone (3 + years dose) 10/26/2012     Fluzone High-Dose pf (>=65 yr) 11/19/2015     Fluzone High-Dose pf (>=65 yr) 3/14/2017     Fluzone High-Dose pf (>=65 yr) 1/29/2018     Fluzone High-Dose pf (>=65 yr) 12/28/2018     PNEUMOVAX 23 (Pneumococcal PPV23) 0/0/2006         Allergies:     Last Reviewed on 6/01/2019 05:01 PM by Reza Dougherty      No Known Drug Allergies.         Current Medications:     Last Reviewed on 6/01/2019 05:04 PM by Reza Dougherty    Lisinopril 10mg Tablet 1 tab daily     Pravastatin 80mg Tablet Take 1 tablet(s) by mouth at bedtime     Meloxicam 15mg Tablet 1 tab daily     Baclofen 20mg Tablet one po  Qhs     Lidoderm 5% Adhesive Patch 1 patch q 12 hours on and 12 hours off     Gentamicin Sulfate 3mg/1gm Ophthalmic Ointment     Fenugreek     abad 500mg 1 tab daily     cranberrry pills 800mg DAILY     Vitamin D3 1,000IU Tablet Take 1 tablet(s) by mouth daily     Vitamin B12     Vitamin B6     Hydrochlorothiazide (HCTZ) 25mg Tablet one a day     Hydrocodone/Acetaminophen 5mg/325mg Tablet 1 PO Qdaily PRN for pain         OBJECTIVE:        Vitals:         Current: 5/28/2019 3:31:42 PM    Ht:  5 ft, 2.25 in;  Wt: 138.6 lbs;  BMI: 25.1    T: 98.2 F (oral);  BP: 116/43 mm Hg (left arm, sitting);  P: 66 bpm (left arm (BP Cuff), sitting);  sCr: 0.99 mg/dL;  GFR: 44.48    O2 Sat: 100 % (room air)        Exams:     PHYSICAL EXAM:     GENERAL: vital signs recorded - well developed, well nourished;  no apparent distress;     NEUROLOGIC: GROSSLY INTACT     PSYCHIATRIC:  appropriate affect and demeanor; normal speech pattern; grossly normal memory; LOW BACK examination: Inspection: normal;     Palpation: lumbar tenderness;     Neuro-vascular: normal;     Muscular Strength: normal;     Range of Motion: LAROM;     Maneuvers: (+) left straight leg raise.              ASSESSMENT           724.2   M54.17  Chronic intermittent low back pain              DDx:     V79.0   Z13.89  Screening for depression              DDx:         ORDERS:         Meds Prescribed:       Refill of: Hydrocodone/Acetaminophen 5mg/325mg Tablet 1 PO Qdaily PRN for pain  #30 (Thirty) tablet(s) Refills: 0         Radiology/Test Orders:       3014F  Screening mammography results documented and reviewed (PV)1  (In-House)         3017F  Colorectal CA screen results documented and reviewed (PV)  (In-House)           Other Orders:         Depression screen negative  (In-House)           Negative EtOH screen  (In-House)                   PLAN:          Chronic intermittent low back pain         MEDICATIONS: (no change to current medication regimen)      FOLLOW-UP: Schedule a follow-up visit in 6 months.      CONSIDER REPEAT P.T. EVAL           Prescriptions:       Refill of: Hydrocodone/Acetaminophen 5mg/325mg Tablet 1 PO Qdaily PRN for pain  #30 (Thirty) tablet(s) Refills: 0          Screening for depression     MIPS Negative Depression Screen Negative alcohol screen     MAMMOGRAM: Done within last 2 years and results in are chart     COLORECTAL CANCER SCREENING: Results are in chart           Orders:         Depression screen negative  (In-House)           Negative EtOH screen  (In-House)         3014F  Screening mammography results documented and reviewed (PV)1  (In-House)         3017F  Colorectal CA screen results documented and reviewed (PV)  (In-House)               Patient Recommendations:        For  Chronic intermittent low back pain:     Schedule a follow-up visit in 6 months.              CHARGE CAPTURE           **Please note: ICD descriptions below are intended for billing purposes only and may not represent clinical diagnoses**        Primary Diagnosis:         724.2 Chronic intermittent low back pain            M54.17    Radiculopathy, lumbosacral region              Orders:          [id number]   Office/outpatient visit; established patient, level 3  (In-House)           V79.0 Screening for depression            Z13.89    Encounter for screening for other disorder              Orders:             Depression screen negative  (In-House)                Negative EtOH screen  (In-House)             3014F   Screening mammography results documented and reviewed (PV)1  (In-House)             3017F   Colorectal CA screen results documented and reviewed (PV)  (In-House)

## 2021-05-18 NOTE — PROGRESS NOTES
Paige Cantu  1940     Office/Outpatient Visit    Visit Date: Wed, Dec 18, 2019 02:07 pm    Provider: Reza Dougherty MD (Assistant: Sharon Cavazos MA)    Location: [facility]        Electronically signed by Reza Dougherty MD on  2019 03:32:02 PM                             Subjective:        CC: Ms. Cantu is a 78 year old White female.  This is a follow-up visit.  workmans comp;         HPI:           Patient complains of radiculopathy, lumbosacral region.  The discomfort is most prominent in the lumbar spine.  This radiates to the left buttock,  left posterior thigh, and left calf.  She characterizes it as intermittent, moderate in intensity, burning, and WAXES AND WANES.  She states that the current episode of pain started years ago.  The event which precipitated this pain was lifting.  This occurred at work.  Associated symptoms include numbness in the legs and LEGS CRAMP, MOSTLY AT NIGHT.  She denies weakness of the legs.  She notes some pain relief with narcotic pain medication, NSAIDs, muscle relaxants, USES NARCOTICS SAPARINGLY, and LIDODERM.  Other details: X-RAYS SHOWED SOME DJD.  HAS NOT HAD AN MRI FOR SEVERAL YEARS.  TAKING HER PAIN MEDS MORE REGULARY SINCE COLDER WEATHER.      ROS:         MUSCULOSKELETAL:  Positive for back pain ( recurrent ).      NEUROLOGICAL:  Positive for paresthesia ( left lower extremity ).          Past Medical History / Family History / Social History:         Last Reviewed on 2019 02:15 PM by Reza Dougherty    Past Medical History:                 PAST MEDICAL HISTORY             GYNECOLOGICAL HISTORY:                 ADVANCE DIRECTIVES: Living will         PREVENTIVE HEALTH MAINTENANCE             BONE DENSITY: was last done 2018 with normal results     COLORECTAL CANCER SCREENING: Up to date (colonoscopy q10y; sigmoidoscopy q5y; Cologuard q3y) was last done , Results are in chart; colonoscopy with normal  results     Hepatitis C Medicare Screening: was last done 2018     MAMMOGRAM: Done within last 2 years and results in are chart was last done 06- normal         Surgical History:         Hysterectomy     BSO;         Family History:     Unremarkable         Social History:         Marital Status:      Children: 2 children         Tobacco/Alcohol/Supplements:     Last Reviewed on 12/18/2019 02:15 PM by Reza Dougherty    Tobacco: She has never smoked.      Caffeine:  She admits to consuming caffeine via coffee ( 3 servings per day ) and tea ( 1 serving per day ).          Substance Abuse History:     Last Reviewed on 12/18/2019 02:15 PM by Reza Dougherty    NEGATIVE         Current Problems:     Last Reviewed on 12/18/2019 02:15 PM by Reza Dougherty    Restless legs syndrome (RLS)    High cholesterol    Pure hypercholesterolemia    Essential (primary) hypertension    HTN    Varicose veins of lower extremities, with Pain    Radiculopathy, lumbosacral region    Chronic intermittent low back pain    Allergies        Immunizations:     Td adult 10/7/2010    zzPrevnar-13 8/11/2015    Fluzone (3 + years dose) 10/24/2011    Fluzone (3 + years dose) 10/26/2012    Fluzone High-Dose pf (>=65 yr) 11/19/2015    Fluzone High-Dose pf (>=65 yr) 3/14/2017    Fluzone High-Dose pf (>=65 yr) 1/29/2018    Fluzone High-Dose pf (>=65 yr) 12/28/2018    Fluzone High-Dose pf (>=65 yr) 11/4/2019    PNEUMOVAX 23 (Pneumococcal PPV23) 0/0/2006        Allergies:     Last Reviewed on 12/18/2019 02:15 PM by Reza Dougherty    No Known Allergies.        Current Medications:     Last Reviewed on 12/18/2019 02:15 PM by Reza Dougherty    hydroCHLOROthiazide 25 mg oral tablet [one a day]    Vitamin B6     Vitamin B12     cranberrry pills 800mg DAILY     Vitamin D3 25 mcg (1,000 unit) oral tablet [Take 1 tablet(s) by mouth daily]    Pravastatin 80mg Tablet [Take 1 tablet(s) by mouth at bedtime]     abad 500mg 1 tab daily      Lidoderm 5% Adhesive Patch [1 patch q 12 hours on and 12 hours off]    Meloxicam 15mg Tablet [1 tab daily]    Baclofen 20mg Tablet [one po Qhs]    Lisinopril 10 mg oral tablet [1 tab daily]    Fenugreek     HYDROcodone-acetaminophen 5-325 mg oral tablet [1 PO Qdaily PRN for pain]        Objective:        Vitals:         Current: 12/18/2019 2:12:35 PM    Ht:  5 ft, 2.25 in;  Wt: 142.6 lbs;  BMI: 25.9T: 98.7 F (oral);  BP: 146/55 mm Hg (left arm, sitting);  P: 78 bpm (left arm (BP Cuff), sitting);  sCr: 0.86 mg/dL;  GFR: 51.83        Lab/Test Results:         Amphetamines Screen, Urin: Negative (12/18/2019),     BAR-Barbiturates Screen, Urin: Negative (12/18/2019),     Buprenorphine: Negative (12/18/2019),     BZO-Benzodiazepines Screen,Ur: Negative (12/18/2019),     Cocaine(Metab.)Screen, Ur: Negative (12/18/2019),     MDMA-Ecstasy: Negative (12/18/2019),     Met-Methamphetamine: Negative (12/18/2019),     MTD-Methadone Screen, Urine: Negative (12/18/2019),     Opiate Screen, Urine: Positive (12/18/2019),     OXY-Oxycodone: Negative (12/18/2019),     PCP-Phencyclidine Screen, Uri: Negative (12/18/2019),     THC Cannabinoids Screen, Urin: Negative (12/18/2019),     Urine temperature: confirmed (12/18/2019),     Date and time of last pill: hydrocodone 12-17-19 at 2300/kh (12/18/2019),     Performed by: pr (12/18/2019),     Collection Time: 14:54 (12/18/2019),             Assessment:         M54.17   Radiculopathy, lumbosacral region       Z79.899   Other long term (current) drug therapy           ORDERS:         Lab Orders:       APPTO  Appointment need  (In-House)            14225  Drug test prsmv qual dir optical obs per day  (In-House)                      Plan:         Radiculopathy, lumbosacral region        FOLLOW-UP: Schedule a follow-up visit in 3 months.:.  Controlled substance documentation: Sidney reviewed; drug screen performed and appropriate; consent is reviewed and signed and  on the chart.  She is aware of risk of addiction on this medication, understands that she will need to follow up for a review every 3 months and her medications will be adjusted or decreased as deemed appropriate at each visit.  No history of drug or alcohol abuse.  No concerns about diversion or abuse. She denies side effects related to the medication.  She is aware that she may be called in for pill counts.  The dosing of this medication will be reviewed on a regular basis and reduced if possible..  Ongoing use of a controlled substance is necessary for this patient to have a normal quality of life           Orders:       APPTO  Appointment need  (In-House)              Other long term (current) drug therapy    LABORATORY:  Labs ordered to be performed today include Drug screen.            Orders:       [id number]  Drug test prsmv qual dir optical obs per day  (In-House)                  Patient Recommendations:        For  Radiculopathy, lumbosacral region:    Schedule a follow-up visit in 3 months.                APPOINTMENT INFORMATION:        Monday Tuesday Wednesday Thursday Friday Saturday Sunday            Time:___________________AM  PM   Date:_____________________             Charge Capture:         Primary Diagnosis:     M54.17  Radiculopathy, lumbosacral region           Orders:      [id number]  Office/outpatient visit; established patient, level 3  (In-House)            APPTO  Appointment need  (In-House)              Z79.899  Other long term (current) drug therapy           Orders:      [id number]  Drug test prsmv qual dir optical obs per day  (In-House)

## 2021-06-23 RX ORDER — LISINOPRIL 10 MG/1
TABLET ORAL
Qty: 30 TABLET | Refills: 0 | Status: SHIPPED | OUTPATIENT
Start: 2021-06-23 | End: 2021-07-29

## 2021-06-23 RX ORDER — LISINOPRIL 10 MG/1
TABLET ORAL
Qty: 30 TABLET | Refills: 0 | Status: SHIPPED | OUTPATIENT
Start: 2021-06-23 | End: 2021-06-23

## 2021-06-23 NOTE — TELEPHONE ENCOUNTER
LAST OV: 4/1/21  NEXT OV: 7/1/21  LAST LAB: 12/4/19    PLEASE SIGN OR ADVISE    Lipid Panel  Order: [id number]  Status:  Final result   Visible to patient:  No (not released)   Next appt:  07/01/2021 at 03:15 PM in Family Medicine (Reza Dougherty MD)       Component   Ref Range & Units 1 yr ago   Triglycerides   40 - 150 mg/dL 138    Comment: <150 mg/dL-Normal   150-199 mg/dL-Borderline High   200-499 mg/dL-High   >500 mg/dL- Very High    Total Cholesterol   107 - 200 mg/dL 189    Comment: <200 mg/dL-Desirable   200-239 mg/dL-Borderline High   >240 mg/dL-High   >500 mg/dL-Very High    HDL Cholesterol   40 - 60 mg/dL 65High     Comment: <40 mg/dL-Low   >60 mg/dL- Desirable    Chol/HDL Ratio   3.0 - 6.0 NA 2.9Low     LDL Cholesterol    70 - 100 mg/dL 96    Comment: Recommended  LDL Levels   <100 mg/dL-Optimal   100-129 mg/dL-Near Optimal/above optimal   130-159 mg/dL-Borderline High   160-189 mg/dL-High   >190 mg/dL-Very High    VLDL Cholesterol   5 - 37 mg/dL 28          Specimen Collected: 12/04/19 15:16 Last Resulted: 12/04/19 20:57

## 2021-06-23 NOTE — TELEPHONE ENCOUNTER
LAST OV: 4/1/21  NEXT OV: 7/1/21  LAST LAB: 12/4/19 LIPID 8/28/19 CMP- unable to pull up EMDS    PLEASE SIGN OR ADVISE  Contains abnormal data Lipid Panel  Order: [id number]  Status:  Final result   Visible to patient:  No (not released)   Next appt:  07/01/2021 at 03:15 PM in Family Medicine (Reza Dougherty MD)       Component   Ref Range & Units 1 yr ago   Triglycerides   40 - 150 mg/dL 196High     Comment: <150 mg/dL-Normal   150-199 mg/dL-Borderline High   200-499 mg/dL-High   >500 mg/dL- Very High    Total Cholesterol   107 - 200 mg/dL 240High     Comment: <200 mg/dL-Desirable   200-239 mg/dL-Borderline High   >240 mg/dL-High   >500 mg/dL-Very High    HDL Cholesterol   40 - 60 mg/dL 61High     Comment: <40 mg/dL-Low   >60 mg/dL- Desirable    Chol/HDL Ratio   3.0 - 6.0 NA 3.9    LDL Cholesterol    70 - 100 mg/dL 140High     Comment: Recommended  LDL Levels   <100 mg/dL-Optimal   100-129 mg/dL-Near Optimal/above optimal   130-159 mg/dL-Borderline High   160-189 mg/dL-High   >190 mg/dL-Very High    VLDL Cholesterol   5 - 37 mg/dL 39High           Specimen Collected: 08/28/19 14:28 Last Resulted: 08/28/19 20:51               Lab Results   Component Value Date    BUN 17 08/28/2019    CREATININE 0.86 08/28/2019    BCR 20 08/28/2019    K 4.2 08/28/2019    CO2 24 08/28/2019    CALCIUM 9.2 08/28/2019    ALBUMIN 4.4 08/28/2019    LABIL2 1.8 08/28/2019    AST 17 08/28/2019    ALT 10 08/28/2019     \

## 2021-07-01 ENCOUNTER — OFFICE VISIT (OUTPATIENT)
Dept: FAMILY MEDICINE CLINIC | Age: 81
End: 2021-07-01

## 2021-07-01 VITALS
WEIGHT: 138.2 LBS | SYSTOLIC BLOOD PRESSURE: 149 MMHG | TEMPERATURE: 98.5 F | HEART RATE: 72 BPM | DIASTOLIC BLOOD PRESSURE: 62 MMHG | BODY MASS INDEX: 25.43 KG/M2 | HEIGHT: 62 IN

## 2021-07-01 VITALS
HEIGHT: 62 IN | DIASTOLIC BLOOD PRESSURE: 58 MMHG | WEIGHT: 137.2 LBS | HEART RATE: 71 BPM | BODY MASS INDEX: 25.25 KG/M2 | SYSTOLIC BLOOD PRESSURE: 142 MMHG | TEMPERATURE: 98.5 F

## 2021-07-01 VITALS
HEART RATE: 78 BPM | DIASTOLIC BLOOD PRESSURE: 55 MMHG | WEIGHT: 142.6 LBS | BODY MASS INDEX: 26.24 KG/M2 | HEIGHT: 62 IN | SYSTOLIC BLOOD PRESSURE: 146 MMHG | TEMPERATURE: 98.7 F

## 2021-07-01 VITALS
WEIGHT: 138.6 LBS | HEART RATE: 66 BPM | HEIGHT: 62 IN | BODY MASS INDEX: 25.51 KG/M2 | SYSTOLIC BLOOD PRESSURE: 116 MMHG | OXYGEN SATURATION: 100 % | TEMPERATURE: 98.2 F | DIASTOLIC BLOOD PRESSURE: 43 MMHG

## 2021-07-01 VITALS
HEIGHT: 62 IN | WEIGHT: 140 LBS | DIASTOLIC BLOOD PRESSURE: 70 MMHG | TEMPERATURE: 98.6 F | HEART RATE: 80 BPM | SYSTOLIC BLOOD PRESSURE: 151 MMHG | BODY MASS INDEX: 25.76 KG/M2

## 2021-07-01 VITALS
SYSTOLIC BLOOD PRESSURE: 137 MMHG | BODY MASS INDEX: 25.36 KG/M2 | TEMPERATURE: 98.1 F | HEIGHT: 62 IN | WEIGHT: 137.8 LBS | DIASTOLIC BLOOD PRESSURE: 70 MMHG | HEART RATE: 76 BPM

## 2021-07-01 VITALS
BODY MASS INDEX: 25.17 KG/M2 | HEIGHT: 62 IN | HEART RATE: 77 BPM | WEIGHT: 136.8 LBS | TEMPERATURE: 98.1 F | DIASTOLIC BLOOD PRESSURE: 74 MMHG | SYSTOLIC BLOOD PRESSURE: 131 MMHG

## 2021-07-01 VITALS
HEIGHT: 62 IN | HEART RATE: 71 BPM | TEMPERATURE: 97.7 F | WEIGHT: 142.4 LBS | BODY MASS INDEX: 26.2 KG/M2 | SYSTOLIC BLOOD PRESSURE: 158 MMHG | DIASTOLIC BLOOD PRESSURE: 64 MMHG

## 2021-07-01 VITALS
DIASTOLIC BLOOD PRESSURE: 62 MMHG | HEART RATE: 83 BPM | TEMPERATURE: 98.2 F | WEIGHT: 138.8 LBS | BODY MASS INDEX: 25.54 KG/M2 | SYSTOLIC BLOOD PRESSURE: 139 MMHG | HEIGHT: 62 IN

## 2021-07-01 VITALS
HEART RATE: 81 BPM | HEIGHT: 62 IN | SYSTOLIC BLOOD PRESSURE: 138 MMHG | DIASTOLIC BLOOD PRESSURE: 75 MMHG | TEMPERATURE: 99 F | BODY MASS INDEX: 25.03 KG/M2 | WEIGHT: 136 LBS

## 2021-07-01 VITALS
DIASTOLIC BLOOD PRESSURE: 61 MMHG | HEART RATE: 70 BPM | BODY MASS INDEX: 25.01 KG/M2 | WEIGHT: 135.9 LBS | HEIGHT: 62 IN | SYSTOLIC BLOOD PRESSURE: 133 MMHG | TEMPERATURE: 97.3 F

## 2021-07-01 DIAGNOSIS — G89.29 CHRONIC LEFT-SIDED LOW BACK PAIN WITH LEFT-SIDED SCIATICA: Primary | ICD-10-CM

## 2021-07-01 DIAGNOSIS — M54.42 CHRONIC LEFT-SIDED LOW BACK PAIN WITH LEFT-SIDED SCIATICA: Primary | ICD-10-CM

## 2021-07-01 PROCEDURE — 99213 OFFICE O/P EST LOW 20 MIN: CPT | Performed by: FAMILY MEDICINE

## 2021-07-01 RX ORDER — HYDROCODONE BITARTRATE AND ACETAMINOPHEN 5; 325 MG/1; MG/1
1 TABLET ORAL DAILY PRN
Qty: 30 TABLET | Refills: 0 | Status: SHIPPED | OUTPATIENT
Start: 2021-07-01 | End: 2021-07-31

## 2021-07-01 RX ORDER — HYDROCODONE BITARTRATE AND ACETAMINOPHEN 5; 325 MG/1; MG/1
1 TABLET ORAL EVERY 6 HOURS PRN
COMMUNITY
End: 2021-07-01 | Stop reason: SDUPTHER

## 2021-07-01 RX ORDER — ANASTROZOLE 1 MG/1
TABLET ORAL
COMMUNITY
Start: 2021-05-21

## 2021-07-01 RX ORDER — HYDROCHLOROTHIAZIDE 25 MG/1
25 TABLET ORAL DAILY
COMMUNITY
Start: 2021-04-20 | End: 2021-10-26 | Stop reason: SDUPTHER

## 2021-07-01 NOTE — PROGRESS NOTES
Chief Complaint  Back Pain (workmans comp )    Subjective          Paige Cantu presents to [facility]  --CHRONIC LEFT LOWER BACK PAIN, RADIATING DOWN THE LEFT LEG.  THIS IS THE RESULT OF A WOR-RELATED INJURY.  SHE IS CURRENTLY REPORTING FAIR RELIEF OF HER SYMPTOMS ON HER CURRENT MEDS.          No Known Allergies     Health Maintenance Due   Topic Date Due   • DXA SCAN  Never done   • ZOSTER VACCINE (1 of 2) Never done   • Pneumococcal Vaccine 65+ (2 of 2 - PPSV23) 08/11/2016   • TDAP/TD VACCINES (2 - Tdap) 10/07/2020   • ANNUAL WELLNESS VISIT  Never done   • LIPID PANEL  07/01/2021        Current Outpatient Medications on File Prior to Visit   Medication Sig   • anastrozole (ARIMIDEX) 1 MG tablet    • hydroCHLOROthiazide (HYDRODIURIL) 25 MG tablet Take 25 mg by mouth Daily.   • lisinopril (PRINIVIL,ZESTRIL) 10 MG tablet TAKE 1 TABLET BY MOUTH EVERY DAY   • [DISCONTINUED] HYDROcodone-acetaminophen (NORCO) 5-325 MG per tablet Take 1 tablet by mouth Every 6 (Six) Hours As Needed.     No current facility-administered medications on file prior to visit.       Immunization History   Administered Date(s) Administered   • Influenza, Unspecified 09/30/2020   • Pneumococcal Conjugate 13-Valent (PCV13) 08/11/2015   • Td 10/07/2010       Review of Systems   Constitutional: Negative for appetite change, chills, fatigue and fever.   HENT: Negative for ear pain, hearing loss, rhinorrhea and sore throat.    Respiratory: Negative for cough and shortness of breath.    Cardiovascular: Negative for chest pain, palpitations and leg swelling.   Gastrointestinal: Negative for abdominal pain, nausea and vomiting.   Musculoskeletal: Negative for myalgias.   Skin: Negative for rash and skin lesions.   Neurological: Negative for headache.   Psychiatric/Behavioral: Negative for depressed mood.        Objective     /62 (BP Location: Right arm, Patient Position: Standing) Comment (Patient Position): pt  "wanted to stand up and recheck bp  Pulse 72   Temp 98.5 °F (36.9 °C)   Ht 158.1 cm (62.24\")   Wt 62.7 kg (138 lb 3.2 oz)   BMI 25.08 kg/m²       Physical Exam  Vitals and nursing note reviewed.   Constitutional:       Appearance: Normal appearance. She is not toxic-appearing.   Cardiovascular:      Rate and Rhythm: Normal rate and regular rhythm.      Heart sounds: No murmur heard.     Pulmonary:      Effort: Pulmonary effort is normal.      Breath sounds: Normal breath sounds.   Abdominal:      Palpations: Abdomen is soft.      Tenderness: There is no abdominal tenderness.   Musculoskeletal:      Cervical back: Neck supple.      Lumbar back: Tenderness present. Normal range of motion. Positive left straight leg raise test.      Right lower leg: No edema.      Left lower leg: No edema.   Lymphadenopathy:      Cervical: No cervical adenopathy.   Neurological:      General: No focal deficit present.      Mental Status: She is alert.   Psychiatric:         Mood and Affect: Mood normal.         Behavior: Behavior normal.         Result Review :                             Assessment and Plan      Diagnoses and all orders for this visit:    1. Chronic left-sided low back pain with left-sided sciatica (Primary)  Assessment & Plan:  STABLE ON CURRENT MEDS.  THIS TREATMENT IS NECESSARY FOR HER TO  HAVE A NORMAL QUALITY OF LIFE.      Orders:  -     HYDROcodone-acetaminophen (NORCO) 5-325 MG per tablet; Take 1 tablet by mouth Daily As Needed for Moderate Pain  for up to 30 days.  Dispense: 30 tablet; Refill: 0          Follow Up     Return in about 3 months (around 10/1/2021) for Recheck.    Patient was given instructions and counseling regarding her condition or for health maintenance advice. Please see specific information pulled into the AVS if appropriate.       "

## 2021-07-01 NOTE — ASSESSMENT & PLAN NOTE
STABLE ON CURRENT MEDS.  THIS TREATMENT IS NECESSARY FOR HER TO  HAVE A NORMAL QUALITY OF LIFE.    
no   need

## 2021-07-02 VITALS
HEART RATE: 78 BPM | SYSTOLIC BLOOD PRESSURE: 117 MMHG | HEIGHT: 62 IN | BODY MASS INDEX: 26.35 KG/M2 | WEIGHT: 143.2 LBS | TEMPERATURE: 98.1 F | DIASTOLIC BLOOD PRESSURE: 57 MMHG

## 2021-07-02 VITALS
WEIGHT: 144.2 LBS | SYSTOLIC BLOOD PRESSURE: 151 MMHG | BODY MASS INDEX: 26.54 KG/M2 | HEIGHT: 62 IN | HEART RATE: 83 BPM | TEMPERATURE: 97.6 F | DIASTOLIC BLOOD PRESSURE: 65 MMHG

## 2021-07-02 VITALS
TEMPERATURE: 98 F | HEIGHT: 62 IN | DIASTOLIC BLOOD PRESSURE: 63 MMHG | BODY MASS INDEX: 25.98 KG/M2 | SYSTOLIC BLOOD PRESSURE: 116 MMHG

## 2021-07-02 VITALS
TEMPERATURE: 98.6 F | WEIGHT: 143.6 LBS | HEIGHT: 62 IN | BODY MASS INDEX: 26.43 KG/M2 | SYSTOLIC BLOOD PRESSURE: 136 MMHG | DIASTOLIC BLOOD PRESSURE: 76 MMHG | HEART RATE: 83 BPM

## 2021-07-02 VITALS
HEART RATE: 74 BPM | TEMPERATURE: 97.4 F | SYSTOLIC BLOOD PRESSURE: 147 MMHG | HEIGHT: 62 IN | BODY MASS INDEX: 26.83 KG/M2 | WEIGHT: 145.8 LBS | DIASTOLIC BLOOD PRESSURE: 67 MMHG

## 2021-07-02 VITALS — BODY MASS INDEX: 25.91 KG/M2 | HEIGHT: 62 IN | TEMPERATURE: 97.3 F

## 2021-07-02 VITALS
DIASTOLIC BLOOD PRESSURE: 62 MMHG | HEIGHT: 62 IN | WEIGHT: 142.8 LBS | HEART RATE: 68 BPM | SYSTOLIC BLOOD PRESSURE: 150 MMHG | TEMPERATURE: 98 F | BODY MASS INDEX: 26.28 KG/M2

## 2021-07-02 VITALS
HEIGHT: 62 IN | HEART RATE: 72 BPM | BODY MASS INDEX: 26.79 KG/M2 | SYSTOLIC BLOOD PRESSURE: 148 MMHG | TEMPERATURE: 96.7 F | DIASTOLIC BLOOD PRESSURE: 50 MMHG | WEIGHT: 145.6 LBS

## 2021-07-29 RX ORDER — LISINOPRIL 10 MG/1
TABLET ORAL
Qty: 90 TABLET | Refills: 0 | Status: SHIPPED | OUTPATIENT
Start: 2021-07-29 | End: 2021-10-25

## 2021-07-30 RX ORDER — LISINOPRIL 10 MG/1
TABLET ORAL
Qty: 90 TABLET | Refills: 3 | Status: SHIPPED | OUTPATIENT
Start: 2021-07-30 | End: 2021-09-27 | Stop reason: SDUPTHER

## 2021-08-02 NOTE — TELEPHONE ENCOUNTER
Caller: Paige Cantu    Relationship: Self    Best call back number: [phone]    Medication needed:    HYDROcodone-Acetaminophen 5-325 MG 1 tablet Oral Daily PRN      When do you need the refill by: 8/3/2021    What additional details did the patient provide when requesting the medication: ONE DAY LEFT     Does the patient have less than a 3 day supply:  [x] Yes  [] No    What is the patient's preferred pharmacy: [facility] #[id number] [place] [id number] [facility] AT [facility] [address]  - [phone]  - [phone] FX

## 2021-08-03 RX ORDER — HYDROCODONE BITARTRATE AND ACETAMINOPHEN 5; 325 MG/1; MG/1
1 TABLET ORAL DAILY PRN
COMMUNITY
End: 2021-08-03 | Stop reason: SDUPTHER

## 2021-08-04 RX ORDER — HYDROCODONE BITARTRATE AND ACETAMINOPHEN 5; 325 MG/1; MG/1
1 TABLET ORAL DAILY PRN
Qty: 10 TABLET | Refills: 0 | Status: SHIPPED | OUTPATIENT
Start: 2021-08-04 | End: 2021-08-06 | Stop reason: SDUPTHER

## 2021-08-05 NOTE — TELEPHONE ENCOUNTER
PATIENT WOULD LIKE TO GET HER WHOLE 30 DAY SUPPLY OF NORCO. SHE WANTS TO KNOW WHY SHE ONLY GOT 10 PILLS. PLEASE CALL TO ADVISE.

## 2021-08-06 ENCOUNTER — TELEPHONE (OUTPATIENT)
Dept: FAMILY MEDICINE CLINIC | Age: 81
End: 2021-08-06

## 2021-08-06 DIAGNOSIS — G89.29 CHRONIC LEFT-SIDED LOW BACK PAIN WITH LEFT-SIDED SCIATICA: Primary | ICD-10-CM

## 2021-08-06 DIAGNOSIS — M54.42 CHRONIC LEFT-SIDED LOW BACK PAIN WITH LEFT-SIDED SCIATICA: Primary | ICD-10-CM

## 2021-08-06 NOTE — TELEPHONE ENCOUNTER
Caller: Paige Cantu    Relationship: Self    Best call back number: [phone]    Medication needed:   Requested Prescriptions     Pending Prescriptions Disp Refills   • HYDROcodone-acetaminophen (NORCO) 5-325 MG per tablet 10 tablet 0     Sig: Take 1 tablet by mouth Daily As Needed for Moderate Pain .       When do you need the refill by: 08/06/2021    Does the patient have less than a 3 day supply:  [x] Yes  [] No    What is the patient's preferred pharmacy: [facility] #[id number] - [place], [place] - [address] [facility] AT [facility] [address] &  - [phone]  - [phone] FX

## 2021-08-06 NOTE — TELEPHONE ENCOUNTER
Caller: Paige Cantu    Relationship: Self    Best call back number: [phone]    What is the best time to reach you ANYTIME     Who are you requesting to speak with (clinical staff, provider,  specific staff member): WOULD NOT INTO DETAIL ABOUT WHAT ITS ABOUT

## 2021-08-10 NOTE — TELEPHONE ENCOUNTER
Dr Zazueta on call doctor filled Hydrocodone for #10.  Will send to Dr Dougherty to fill. See other refill note same day.

## 2021-08-10 NOTE — TELEPHONE ENCOUNTER
Refill request for Hydrocodone Acet 5/325mg one daily as needed. LV- 07/01/21, LF- 08/04/21, #10 by on call Dr Zazueta. Sindey- 04/03/21, Tox- 04/01/21.

## 2021-08-12 RX ORDER — HYDROCODONE BITARTRATE AND ACETAMINOPHEN 5; 325 MG/1; MG/1
1 TABLET ORAL DAILY PRN
Qty: 30 TABLET | Refills: 0 | Status: SHIPPED | OUTPATIENT
Start: 2021-08-12 | End: 2021-09-09 | Stop reason: SDUPTHER

## 2021-09-09 DIAGNOSIS — M54.42 CHRONIC LEFT-SIDED LOW BACK PAIN WITH LEFT-SIDED SCIATICA: ICD-10-CM

## 2021-09-09 DIAGNOSIS — G89.29 CHRONIC LEFT-SIDED LOW BACK PAIN WITH LEFT-SIDED SCIATICA: ICD-10-CM

## 2021-09-09 NOTE — TELEPHONE ENCOUNTER
Caller: Paige Cantu    Relationship: Self    Best call back number: [phone]    Medication needed:   Requested Prescriptions     Pending Prescriptions Disp Refills   • HYDROcodone-acetaminophen (NORCO) 5-325 MG per tablet 30 tablet 0     Sig: Take 1 tablet by mouth Daily As Needed for Moderate Pain .       When do you need the refill by: 09/10    What additional details did the patient provide when requesting the medication:  THE PATIENT HAS ONE AND A HALF TABLETS LEFT.     Does the patient have less than a 3 day supply:  [x] Yes  [] No    What is the patient's preferred pharmacy: [facility] #[id number] - [place], [place] - [address] AT [facility] OF [address] & [address] [facility] - [phone] [facility] [phone] FX

## 2021-09-10 ENCOUNTER — TELEPHONE (OUTPATIENT)
Dept: FAMILY MEDICINE CLINIC | Age: 81
End: 2021-09-10

## 2021-09-10 RX ORDER — HYDROCODONE BITARTRATE AND ACETAMINOPHEN 5; 325 MG/1; MG/1
1 TABLET ORAL DAILY PRN
Qty: 30 TABLET | Refills: 0 | Status: SHIPPED | OUTPATIENT
Start: 2021-09-10 | End: 2021-10-08 | Stop reason: SDUPTHER

## 2021-09-10 NOTE — TELEPHONE ENCOUNTER
Rx Refill Note  Requested Prescriptions     Pending Prescriptions Disp Refills   • HYDROcodone-acetaminophen (NORCO) 5-325 MG per tablet 30 tablet 0     Sig: Take 1 tablet by mouth Daily As Needed for Moderate Pain .      Last office visit with prescribing clinician: 7/1/2021      Next office visit with prescribing clinician: 9/27/2021  LAST FILL-8/12/21  UDS-4/1/21  Nathalie Sales LPN  09/10/21, 13:36 EDT

## 2021-09-10 NOTE — TELEPHONE ENCOUNTER
Caller: Paige Cantu    Relationship: Self    Best call back number: [phone]    Medication needed:   Requested Prescriptions      No prescriptions requested or ordered in this encounter   HYDROcodone-acetaminophen (NORCO) 5-325 MG per tablet    When do you need the refill by: ASAP    What additional details did the patient provide when requesting the medication: PATIENT IS FULLY OUT OF MEDICATION    Does the patient have less than a 3 day supply:  [x] Yes  [] No    What is the patient's preferred pharmacy: [place] [address] [facility] [phone] [facility] [phone] FX

## 2021-09-13 ENCOUNTER — TELEPHONE (OUTPATIENT)
Dept: FAMILY MEDICINE CLINIC | Age: 81
End: 2021-09-13

## 2021-09-13 NOTE — TELEPHONE ENCOUNTER
Caller: Paige Cantu    Relationship: Self    Best call back number: [phone]    What is the best time to reach you: ANY    What was the call regarding: PATIENT WOULD LIKE TO HAVE HYDROCODONE SENT TO [facility] AS [facility] IS CLOSED.   HYDROcodone-acetaminophen (NORCO) 5-325 MG per tablet      Do you require a callback: IF NEEDED    PHARMACY INFO:    [facility] - [place], [place] - [address] [facility] - [phone] [facility] [phone]   [phone]

## 2021-09-27 ENCOUNTER — OFFICE VISIT (OUTPATIENT)
Dept: FAMILY MEDICINE CLINIC | Age: 81
End: 2021-09-27

## 2021-09-27 VITALS
TEMPERATURE: 98.5 F | HEART RATE: 72 BPM | WEIGHT: 142.8 LBS | BODY MASS INDEX: 26.28 KG/M2 | HEIGHT: 62 IN | DIASTOLIC BLOOD PRESSURE: 60 MMHG | SYSTOLIC BLOOD PRESSURE: 153 MMHG

## 2021-09-27 DIAGNOSIS — G89.29 CHRONIC LEFT-SIDED LOW BACK PAIN WITH LEFT-SIDED SCIATICA: Primary | ICD-10-CM

## 2021-09-27 DIAGNOSIS — M54.42 CHRONIC LEFT-SIDED LOW BACK PAIN WITH LEFT-SIDED SCIATICA: Primary | ICD-10-CM

## 2021-09-27 PROBLEM — Z85.3 HISTORY OF BREAST CANCER: Status: ACTIVE | Noted: 2021-09-27

## 2021-09-27 PROBLEM — E78.00 HIGH CHOLESTEROL: Status: ACTIVE | Noted: 2021-09-27

## 2021-09-27 PROBLEM — I10 ESSENTIAL HYPERTENSION: Status: ACTIVE | Noted: 2021-09-27

## 2021-09-27 PROCEDURE — 99213 OFFICE O/P EST LOW 20 MIN: CPT | Performed by: FAMILY MEDICINE

## 2021-09-27 NOTE — PROGRESS NOTES
Chief Complaint  Back Pain (workmans comp follow up)    Subjective          Paige Cantu presents to [facility]  ---CHRONIC LEFT LOWER BACK PAIN WITH LEFT SCIATICA.  HAS HAD FOR SEVERAL YEARS FOLLOWING A WORK INJURY.  SHE HAD HAD MRI'S BUT NOTHING AMENABLE TO SURGERY WAS FOUND.  SHE HAS HAD P.T. AND DOES STRETCHING ON HER OWN.  SHE IS STABLE ON THE SAME MEDS.          No Known Allergies     Health Maintenance Due   Topic Date Due   • DXA SCAN  Never done   • ZOSTER VACCINE (1 of 2) Never done   • Pneumococcal Vaccine 65+ (2 of 2 - PPSV23) 08/11/2016   • TDAP/TD VACCINES (2 - Tdap) 10/07/2020   • ANNUAL WELLNESS VISIT  Never done   • LIPID PANEL  07/01/2021        Current Outpatient Medications on File Prior to Visit   Medication Sig   • anastrozole (ARIMIDEX) 1 MG tablet    • hydroCHLOROthiazide (HYDRODIURIL) 25 MG tablet Take 25 mg by mouth Daily.   • HYDROcodone-acetaminophen (NORCO) 5-325 MG per tablet Take 1 tablet by mouth Daily As Needed for Moderate Pain .   • lisinopril (PRINIVIL,ZESTRIL) 10 MG tablet TAKE 1 TABLET BY MOUTH EVERY DAY   • [DISCONTINUED] lisinopril (PRINIVIL,ZESTRIL) 10 MG tablet TAKE 1 TABLET BY MOUTH EVERY DAY     No current facility-administered medications on file prior to visit.       Immunization History   Administered Date(s) Administered   • Influenza, Unspecified 09/30/2020   • Pneumococcal Conjugate 13-Valent (PCV13) 08/11/2015   • Td 10/07/2010       Review of Systems   Constitutional: Negative for appetite change, chills, fatigue and fever.   HENT: Negative for ear pain, rhinorrhea and sore throat.    Respiratory: Negative for cough and shortness of breath.    Cardiovascular: Negative for chest pain, palpitations and leg swelling.   Gastrointestinal: Negative for abdominal pain, nausea and vomiting.   Genitourinary: Negative for dysuria and hematuria.   Musculoskeletal: Positive for back pain. Negative for arthralgias and myalgias.   Neurological:  "Negative for headache.        Objective     /60 (BP Location: Left arm, Patient Position: Sitting)   Pulse 72   Temp 98.5 °F (36.9 °C) (Oral)   Ht 158.1 cm (62.25\")   Wt 64.8 kg (142 lb 12.8 oz)   BMI 25.91 kg/m²       Physical Exam  Vitals and nursing note reviewed.   Constitutional:       General: She is not in acute distress.     Appearance: Normal appearance.   Cardiovascular:      Rate and Rhythm: Normal rate and regular rhythm.      Heart sounds: No murmur heard.     Pulmonary:      Effort: Pulmonary effort is normal.      Breath sounds: Normal breath sounds.   Abdominal:      Palpations: Abdomen is soft.      Tenderness: There is no abdominal tenderness.   Musculoskeletal:         General: No swelling.      Cervical back: Neck supple.      Right lower leg: No edema.      Left lower leg: No edema.      Comments: LOW BACK HAS LIMITED R.O.M. AND POSITIVE LEFT STRAIGHT LEG RAISE TEST.     Lymphadenopathy:      Cervical: No cervical adenopathy.   Neurological:      General: No focal deficit present.      Mental Status: She is alert.      Cranial Nerves: No cranial nerve deficit.      Coordination: Coordination normal.      Gait: Gait normal.   Psychiatric:         Mood and Affect: Mood normal.         Behavior: Behavior normal.         Result Review :                             Assessment and Plan      Diagnoses and all orders for this visit:    1. Chronic left-sided low back pain with left-sided sciatica (Primary)  Assessment & Plan:  CONTINUE SAME MEDS, STRETCHING AND HEAT.  CHRONIC LOW-DOSE NARCOTICS ARE NECESSARY FOR HER TO HAVE A NEAR NORMAL QUALITY OF LIFE.              Follow Up     Return in about 3 months (around 12/27/2021).    Patient was given instructions and counseling regarding her condition or for health maintenance advice. Please see specific information pulled into the AVS if appropriate.       "

## 2021-09-27 NOTE — ASSESSMENT & PLAN NOTE
CONTINUE SAME MEDS, STRETCHING AND HEAT.  CHRONIC LOW-DOSE NARCOTICS ARE NECESSARY FOR HER TO HAVE A NEAR NORMAL QUALITY OF LIFE.

## 2021-10-05 DIAGNOSIS — M54.42 CHRONIC LEFT-SIDED LOW BACK PAIN WITH LEFT-SIDED SCIATICA: ICD-10-CM

## 2021-10-05 DIAGNOSIS — G89.29 CHRONIC LEFT-SIDED LOW BACK PAIN WITH LEFT-SIDED SCIATICA: ICD-10-CM

## 2021-10-05 RX ORDER — HYDROCODONE BITARTRATE AND ACETAMINOPHEN 5; 325 MG/1; MG/1
1 TABLET ORAL DAILY PRN
Qty: 30 TABLET | Refills: 0 | Status: CANCELLED | OUTPATIENT
Start: 2021-10-05

## 2021-10-05 NOTE — TELEPHONE ENCOUNTER
Caller: Paige Cantu    Relationship: Self      Medication requested (name and dosage):   HYDROcodone-acetaminophen (NORCO) 5-325 MG per tablet    Pharmacy where request should be sent:     [facility] #[id number] - [place], [place] - [facility] [address] [facility] AT [facility] [address] &  - [phone] PH - [phone] FX    Best call back number: [phone]    Does the patient have less than a 3 day supply:  [x] Yes  [] No    Vianey Sigaal Rep   10/05/21 12:49 EDT

## 2021-10-06 NOTE — TELEPHONE ENCOUNTER
Pt called inquiring about refill of Hydrocodone. Asked for med to please be filled as she is completely out of medication. -clr

## 2021-10-06 NOTE — TELEPHONE ENCOUNTER
Hub staff attempted to follow warm transfer process and was unsuccessful     Caller: Paige Cantu    Relationship to patient: Self    Best call back number: [phone]    Patient is needing:  PATIENT IS UPSET THAT NO ONE IS RESPONDING TO HER MEDICATION REQUEST. PLEASE CALL AND ADVISE.

## 2021-10-07 ENCOUNTER — TELEPHONE (OUTPATIENT)
Dept: FAMILY MEDICINE CLINIC | Age: 81
End: 2021-10-07

## 2021-10-07 DIAGNOSIS — G89.29 CHRONIC LEFT-SIDED LOW BACK PAIN WITH LEFT-SIDED SCIATICA: ICD-10-CM

## 2021-10-07 DIAGNOSIS — M54.42 CHRONIC LEFT-SIDED LOW BACK PAIN WITH LEFT-SIDED SCIATICA: ICD-10-CM

## 2021-10-07 RX ORDER — HYDROCODONE BITARTRATE AND ACETAMINOPHEN 5; 325 MG/1; MG/1
1 TABLET ORAL DAILY PRN
Qty: 30 TABLET | Refills: 0 | Status: CANCELLED | OUTPATIENT
Start: 2021-10-07

## 2021-10-07 NOTE — TELEPHONE ENCOUNTER
Rx Refill Note  Requested Prescriptions     Pending Prescriptions Disp Refills   • HYDROcodone-acetaminophen (NORCO) 5-325 MG per tablet 30 tablet 0     Sig: Take 1 tablet by mouth Daily As Needed for Moderate Pain .      Last office visit with prescribing clinician: 9/27/2021      Next office visit with prescribing clinician: 1/5/2022  LAST REFILL-9/10/21#30  Nathalie Sales LPN  10/07/21, 14:36 EDT     LMOVM pt not due for refill until 10/10/21, sent to pcp.

## 2021-10-07 NOTE — TELEPHONE ENCOUNTER
Hub staff attempted to follow warm transfer process and was unsuccessful     Caller: Paige Cantu    Relationship to patient: Self    Best call back number:[phone]    Patient is needing:PATIENT WANTS A CALL BACK REGARDING HER MEDICATION. SHE IS VERY UPSET.    HYDROcodone-acetaminophen (NORCO) 5-325 MG per tablet    HER MEDICINE HAS NOT BEEN CALL IN YET.

## 2021-10-08 DIAGNOSIS — G89.29 CHRONIC LEFT-SIDED LOW BACK PAIN WITH LEFT-SIDED SCIATICA: ICD-10-CM

## 2021-10-08 DIAGNOSIS — M54.42 CHRONIC LEFT-SIDED LOW BACK PAIN WITH LEFT-SIDED SCIATICA: ICD-10-CM

## 2021-10-08 RX ORDER — HYDROCODONE BITARTRATE AND ACETAMINOPHEN 5; 325 MG/1; MG/1
1 TABLET ORAL DAILY PRN
Qty: 30 TABLET | Refills: 0 | Status: SHIPPED | OUTPATIENT
Start: 2021-10-08 | End: 2021-11-08 | Stop reason: SDUPTHER

## 2021-10-25 RX ORDER — LISINOPRIL 10 MG/1
TABLET ORAL
Qty: 90 TABLET | Refills: 0 | Status: SHIPPED | OUTPATIENT
Start: 2021-10-25 | End: 2022-01-24

## 2021-10-26 RX ORDER — HYDROCHLOROTHIAZIDE 25 MG/1
TABLET ORAL
Qty: 90 TABLET | Refills: 3 | Status: SHIPPED | OUTPATIENT
Start: 2021-10-26 | End: 2022-11-15

## 2021-11-08 DIAGNOSIS — M54.42 CHRONIC LEFT-SIDED LOW BACK PAIN WITH LEFT-SIDED SCIATICA: ICD-10-CM

## 2021-11-08 DIAGNOSIS — G89.29 CHRONIC LEFT-SIDED LOW BACK PAIN WITH LEFT-SIDED SCIATICA: ICD-10-CM

## 2021-11-08 NOTE — TELEPHONE ENCOUNTER
Rx Refill Note  Requested Prescriptions     Pending Prescriptions Disp Refills   • HYDROcodone-acetaminophen (NORCO) 5-325 MG per tablet 30 tablet 0     Sig: Take 1 tablet by mouth Daily As Needed for Moderate Pain .      Last office visit with prescribing clinician: 9/27/2021      Next office visit with prescribing clinician: 1/5/2022  Last fill: 10/08/21, #30  Tox: 04/01/21  Sidney: 04/03/21    Dorys Parra LPN  11/08/21, 17:06 EST

## 2021-11-08 NOTE — TELEPHONE ENCOUNTER
Caller: Paige Cantu    Relationship: Self    Requested Prescriptions:   Requested Prescriptions     Pending Prescriptions Disp Refills   • HYDROcodone-acetaminophen (NORCO) 5-325 MG per tablet 30 tablet 0     Sig: Take 1 tablet by mouth Daily As Needed for Moderate Pain .        Pharmacy where request should be sent: [facility] #[id number] - [place], [place] - [address] AT [facility] [address]  - [phone]  - [phone] FX  [phone]    Best call back number: [phone]    Does the patient have less than a 3 day supply:  [x] Yes  [] No    Vianey Stoner Rep   11/08/21 14:38 EST

## 2021-11-09 RX ORDER — HYDROCODONE BITARTRATE AND ACETAMINOPHEN 5; 325 MG/1; MG/1
1 TABLET ORAL DAILY PRN
Qty: 30 TABLET | Refills: 0 | Status: SHIPPED | OUTPATIENT
Start: 2021-11-09 | End: 2021-12-10 | Stop reason: SDUPTHER

## 2021-12-10 DIAGNOSIS — M54.42 CHRONIC LEFT-SIDED LOW BACK PAIN WITH LEFT-SIDED SCIATICA: ICD-10-CM

## 2021-12-10 DIAGNOSIS — G89.29 CHRONIC LEFT-SIDED LOW BACK PAIN WITH LEFT-SIDED SCIATICA: ICD-10-CM

## 2021-12-10 NOTE — TELEPHONE ENCOUNTER
Caller: Paige Cantu    Relationship: Self    Best call back number: [phone]    Requested Prescriptions:   Requested Prescriptions     Pending Prescriptions Disp Refills   • HYDROcodone-acetaminophen (NORCO) 5-325 MG per tablet 30 tablet 0     Sig: Take 1 tablet by mouth Daily As Needed for Moderate Pain .        Pharmacy where request should be sent: [facility] #[id number] - [facility] [place] - [address] AT [facility] [address] [facility] - [phone] [facility] [phone] FX     Additional details provided by patient: PATIENT HAS ONE PILL LEFT    Does the patient have less than a 3 day supply:  [x] Yes  [] No    Vianey Sigala Rep   12/10/21 13:41 EST

## 2021-12-10 NOTE — TELEPHONE ENCOUNTER
Rx Refill Note  Requested Prescriptions     Pending Prescriptions Disp Refills   • HYDROcodone-acetaminophen (NORCO) 5-325 MG per tablet 30 tablet 0     Sig: Take 1 tablet by mouth Daily As Needed for Moderate Pain .      Last office visit with prescribing clinician: 9/27/2021      Next office visit with prescribing clinician: 1/5/2022   Nathalie Sales LPN  12/10/21, 16:17 EST     -11/9/21#30

## 2021-12-11 RX ORDER — HYDROCODONE BITARTRATE AND ACETAMINOPHEN 5; 325 MG/1; MG/1
1 TABLET ORAL DAILY PRN
Qty: 30 TABLET | Refills: 0 | Status: SHIPPED | OUTPATIENT
Start: 2021-12-11 | End: 2022-01-11 | Stop reason: SDUPTHER

## 2022-01-05 ENCOUNTER — OFFICE VISIT (OUTPATIENT)
Dept: FAMILY MEDICINE CLINIC | Age: 82
End: 2022-01-05

## 2022-01-05 VITALS
TEMPERATURE: 98.5 F | DIASTOLIC BLOOD PRESSURE: 88 MMHG | HEIGHT: 62 IN | BODY MASS INDEX: 26.09 KG/M2 | SYSTOLIC BLOOD PRESSURE: 175 MMHG | HEART RATE: 83 BPM | WEIGHT: 141.8 LBS

## 2022-01-05 DIAGNOSIS — M54.42 CHRONIC LEFT-SIDED LOW BACK PAIN WITH LEFT-SIDED SCIATICA: ICD-10-CM

## 2022-01-05 DIAGNOSIS — Z23 NEED FOR INFLUENZA VACCINATION: Primary | ICD-10-CM

## 2022-01-05 DIAGNOSIS — G89.29 CHRONIC LEFT-SIDED LOW BACK PAIN WITH LEFT-SIDED SCIATICA: ICD-10-CM

## 2022-01-05 PROCEDURE — 90662 IIV NO PRSV INCREASED AG IM: CPT | Performed by: FAMILY MEDICINE

## 2022-01-05 PROCEDURE — 99213 OFFICE O/P EST LOW 20 MIN: CPT | Performed by: FAMILY MEDICINE

## 2022-01-05 PROCEDURE — G0008 ADMIN INFLUENZA VIRUS VAC: HCPCS | Performed by: FAMILY MEDICINE

## 2022-01-05 NOTE — PROGRESS NOTES
Chief Complaint  Pain    Subjective          Paige E Seem presents to [facility]  --CHRONIC LOW BACK PAIN RADIATING DOWN THE LEFT LEG, RELATED TO A WORK INJURY.  CONTINUES ON CHRONIC LOW-DOSE HYDROCODONE.  FEELS ABOUT THE SAME.          No Known Allergies     Health Maintenance Due   Topic Date Due   • DXA SCAN  Never done   • ZOSTER VACCINE (1 of 2) Never done   • Pneumococcal Vaccine 65+ (2 of 2 - PPSV23) 08/11/2016   • TDAP/TD VACCINES (2 - Tdap) 10/07/2020   • ANNUAL WELLNESS VISIT  Never done   • LIPID PANEL  07/01/2021   • COVID-19 Vaccine (3 - Booster for Moderna series) 08/23/2021        Current Outpatient Medications on File Prior to Visit   Medication Sig   • anastrozole (ARIMIDEX) 1 MG tablet    • hydroCHLOROthiazide (HYDRODIURIL) 25 MG tablet Take 1 tablet by mouth once daily   • HYDROcodone-acetaminophen (NORCO) 5-325 MG per tablet Take 1 tablet by mouth Daily As Needed for Moderate Pain .   • lisinopril (PRINIVIL,ZESTRIL) 10 MG tablet TAKE 1 TABLET BY MOUTH EVERY DAY     No current facility-administered medications on file prior to visit.       Immunization History   Administered Date(s) Administered   • COVID-19 (MODERNA) 1st, 2nd, 3rd Dose Only 01/26/2021, 02/23/2021   • Fluzone High Dose =>65 Years (Vaxcare ONLY) 01/29/2018, 12/28/2018   • Fluzone High-Dose 65+yrs 01/05/2022   • Influenza TIV (IM) 10/26/2012   • Influenza, Unspecified 09/30/2020   • Pneumococcal Conjugate 13-Valent (PCV13) 08/11/2015   • Td 10/07/2010       Review of Systems   Constitutional: Negative for activity change, appetite change, chills, fatigue and fever.   HENT: Negative for congestion, ear pain, rhinorrhea and sore throat.    Respiratory: Negative for cough and shortness of breath.    Cardiovascular: Negative for chest pain, palpitations and leg swelling.   Gastrointestinal: Negative for abdominal pain, constipation, diarrhea, nausea and vomiting.   Genitourinary: Negative for dysuria  "and hematuria.   Musculoskeletal: Negative for arthralgias and myalgias.   Neurological: Negative for headache.        Objective     /88 (BP Location: Right arm, Patient Position: Sitting)   Pulse 83   Temp 98.5 °F (36.9 °C) (Oral)   Ht 158.1 cm (62.25\")   Wt 64.3 kg (141 lb 12.8 oz)   BMI 25.73 kg/m²       Physical Exam  Vitals and nursing note reviewed.   Constitutional:       General: She is not in acute distress.     Appearance: Normal appearance.   Cardiovascular:      Rate and Rhythm: Normal rate and regular rhythm.      Heart sounds: Normal heart sounds. No murmur heard.      Pulmonary:      Effort: Pulmonary effort is normal.      Breath sounds: Normal breath sounds.   Abdominal:      Palpations: Abdomen is soft.      Tenderness: There is no abdominal tenderness.   Musculoskeletal:      Cervical back: Neck supple.      Right lower leg: No edema.      Left lower leg: No edema.      Comments: LOW BACK SHOWS LIMITED R.O.M.  POS LEFT SLR.     Lymphadenopathy:      Cervical: No cervical adenopathy.   Neurological:      General: No focal deficit present.      Mental Status: She is alert.      Cranial Nerves: No cranial nerve deficit.      Coordination: Coordination normal.      Gait: Gait normal.   Psychiatric:         Mood and Affect: Mood normal.         Behavior: Behavior normal.         Result Review :                             Assessment and Plan      Diagnoses and all orders for this visit:    1. Need for influenza vaccination (Primary)  -     Fluzone High-Dose 65+yrs    2. Chronic left-sided low back pain with left-sided sciatica  Assessment & Plan:  STABLE ON CURRENT MEDICATION.  AZRA REVIEWED.  TOX SCREEN IS UP TO DATE.  THE CONTINUED USE OF A CONTROLLED SUBSTANCE IS NECESSARY FOR THIS PATIENT TO HAVE A NEAR NORMAL QUALITY OF LIFE AND WILL BE REVIEWED AT THE NEXT ROUTINE VISIT.            Follow Up     Return in about 3 months (around 4/5/2022).    Patient was given instructions and " counseling regarding her condition or for health maintenance advice. Please see specific information pulled into the AVS if appropriate.

## 2022-01-05 NOTE — ASSESSMENT & PLAN NOTE
STABLE ON CURRENT MEDICATION.  AZRA REVIEWED.  TOX SCREEN IS UP TO DATE.  THE CONTINUED USE OF A CONTROLLED SUBSTANCE IS NECESSARY FOR THIS PATIENT TO HAVE A NEAR NORMAL QUALITY OF LIFE AND WILL BE REVIEWED AT THE NEXT ROUTINE VISIT.

## 2022-01-11 DIAGNOSIS — G89.29 CHRONIC LEFT-SIDED LOW BACK PAIN WITH LEFT-SIDED SCIATICA: ICD-10-CM

## 2022-01-11 DIAGNOSIS — M54.42 CHRONIC LEFT-SIDED LOW BACK PAIN WITH LEFT-SIDED SCIATICA: ICD-10-CM

## 2022-01-11 NOTE — TELEPHONE ENCOUNTER
Caller: Paige Cantu    Relationship: Self    Best call back number: [phone]    Requested Prescriptions:   Requested Prescriptions     Pending Prescriptions Disp Refills   • HYDROcodone-acetaminophen (NORCO) 5-325 MG per tablet 30 tablet 0     Sig: Take 1 tablet by mouth Daily As Needed for Moderate Pain .        Pharmacy where request should be sent: [facility] #[id number] - [place], [place] - [address] AT [facility] [address] [facility] - [phone] [facility] [phone] FX       Does the patient have less than a 3 day supply:  [x] Yes  [] No    Vianey Ac Rep   01/11/22 11:54 EST

## 2022-01-13 RX ORDER — HYDROCODONE BITARTRATE AND ACETAMINOPHEN 5; 325 MG/1; MG/1
1 TABLET ORAL DAILY PRN
Qty: 30 TABLET | Refills: 0 | Status: SHIPPED | OUTPATIENT
Start: 2022-01-13 | End: 2022-02-11 | Stop reason: SDUPTHER

## 2022-01-13 NOTE — TELEPHONE ENCOUNTER
Rx Refill Note  Requested Prescriptions     Pending Prescriptions Disp Refills   • HYDROcodone-acetaminophen (NORCO) 5-325 MG per tablet 30 tablet 0     Sig: Take 1 tablet by mouth Daily As Needed for Moderate Pain .      Last office visit with prescribing clinician: 1/5/2022      Next office visit with prescribing clinician: 1/5/2022   Nathalie Sales LPN  01/13/22, 17:02 EST     -12/11/21#30

## 2022-01-23 DIAGNOSIS — I10 ESSENTIAL HYPERTENSION: Primary | ICD-10-CM

## 2022-01-24 RX ORDER — LISINOPRIL 10 MG/1
10 TABLET ORAL DAILY
Qty: 90 TABLET | Refills: 1 | Status: SHIPPED | OUTPATIENT
Start: 2022-01-24 | End: 2022-04-25

## 2022-01-24 NOTE — TELEPHONE ENCOUNTER
Rx Refill Note  Requested Prescriptions     Pending Prescriptions Disp Refills   • lisinopril (PRINIVIL,ZESTRIL) 10 MG tablet [Pharmacy Med Name: LISINOPRIL 10MG TABLETS] 90 tablet 0     Sig: TAKE 1 TABLET BY MOUTH EVERY DAY      Last office visit with prescribing clinician: 1/5/2022      Next office visit with prescribing clinician: 4/5/2022   Lab:SCANNED - LABS (10/14/2021)    Dorys Parra LPN  01/24/22, 11:05 EST

## 2022-02-11 DIAGNOSIS — G89.29 CHRONIC LEFT-SIDED LOW BACK PAIN WITH LEFT-SIDED SCIATICA: ICD-10-CM

## 2022-02-11 DIAGNOSIS — M54.42 CHRONIC LEFT-SIDED LOW BACK PAIN WITH LEFT-SIDED SCIATICA: ICD-10-CM

## 2022-02-11 NOTE — TELEPHONE ENCOUNTER
Caller: Paige Cantu    Relationship: Self    Best call back number: [phone]    Requested Prescriptions:   Requested Prescriptions     Pending Prescriptions Disp Refills   • HYDROcodone-acetaminophen (NORCO) 5-325 MG per tablet 30 tablet 0     Sig: Take 1 tablet by mouth Daily As Needed for Moderate Pain .        Pharmacy where request should be sent: [facility] #[id number] - [place], [place] - [address] AT [facility] [address] [facility] - [phone] [facility] [phone] FX       Does the patient have less than a 3 day supply:  [x] Yes  [] No    Vianye Artis Rep   02/11/22 12:55 EST

## 2022-02-15 RX ORDER — HYDROCODONE BITARTRATE AND ACETAMINOPHEN 5; 325 MG/1; MG/1
1 TABLET ORAL DAILY PRN
Qty: 30 TABLET | Refills: 0 | Status: SHIPPED | OUTPATIENT
Start: 2022-02-15 | End: 2022-03-14 | Stop reason: SDUPTHER

## 2022-03-14 DIAGNOSIS — G89.29 CHRONIC LEFT-SIDED LOW BACK PAIN WITH LEFT-SIDED SCIATICA: ICD-10-CM

## 2022-03-14 DIAGNOSIS — M54.42 CHRONIC LEFT-SIDED LOW BACK PAIN WITH LEFT-SIDED SCIATICA: ICD-10-CM

## 2022-03-14 NOTE — TELEPHONE ENCOUNTER
Caller: Paige Cantu    Relationship: Self    Best call back number: [phone]    Requested Prescriptions:   Requested Prescriptions     Pending Prescriptions Disp Refills   • HYDROcodone-acetaminophen (NORCO) 5-325 MG per tablet 30 tablet 0     Sig: Take 1 tablet by mouth Daily As Needed for Moderate Pain .        Pharmacy where request should be sent: [facility][id number] - [place], [place] - [address] AT [facility] [address] [facility] - [phone] [facility] [phone] FX     Additional details provided by patient: PATIENT IS COMPLETELY OUT OF MEDICATION. PLEASE CALL SCRIPT INTO PHARMACY ASAP.    Does the patient have less than a 3 day supply:  [x] Yes  [] No    Vianey Chan Rep   03/14/22 14:07 EDT

## 2022-03-16 NOTE — TELEPHONE ENCOUNTER
Caller: Paige Cantu    Relationship: Self    Best call back number: [phone]     Requested Prescriptions:   Requested Prescriptions     Pending Prescriptions Disp Refills   • HYDROcodone-acetaminophen (NORCO) 5-325 MG per tablet 30 tablet 0     Sig: Take 1 tablet by mouth Daily As Needed for Moderate Pain .        Pharmacy where request should be sent: [facility] #[id number] - [place], [place] - [address] [facility] AT [facility] [address] [facility] - [phone] [facility] [phone] FX     Additional details provided by patient: PATIENT IS OUT OF MEDICATION    Does the patient have less than a 3 day supply:  [x] Yes  [] No    Vianey Parnell Rep   03/16/22 14:25 EDT

## 2022-03-16 NOTE — TELEPHONE ENCOUNTER
Rx Refill Note  Requested Prescriptions     Pending Prescriptions Disp Refills   • HYDROcodone-acetaminophen (NORCO) 5-325 MG per tablet 30 tablet 0     Sig: Take 1 tablet by mouth Daily As Needed for Moderate Pain .      Last office visit with prescribing clinician: 1/5/2022      Next office visit with prescribing clinician: 4/5/2022  Last fill: 02/15/22, #30  Tox: 04/01/21    Dorys Parra LPN  03/16/22, 15:08 EDT

## 2022-03-17 RX ORDER — HYDROCODONE BITARTRATE AND ACETAMINOPHEN 5; 325 MG/1; MG/1
1 TABLET ORAL DAILY PRN
Qty: 30 TABLET | Refills: 0 | Status: SHIPPED | OUTPATIENT
Start: 2022-03-17 | End: 2022-04-13 | Stop reason: SDUPTHER

## 2022-04-05 ENCOUNTER — LAB (OUTPATIENT)
Dept: LAB | Facility: HOSPITAL | Age: 82
End: 2022-04-05

## 2022-04-05 ENCOUNTER — OFFICE VISIT (OUTPATIENT)
Dept: FAMILY MEDICINE CLINIC | Age: 82
End: 2022-04-05

## 2022-04-05 VITALS
TEMPERATURE: 98.2 F | BODY MASS INDEX: 26.87 KG/M2 | WEIGHT: 146 LBS | DIASTOLIC BLOOD PRESSURE: 92 MMHG | SYSTOLIC BLOOD PRESSURE: 174 MMHG | HEIGHT: 62 IN | HEART RATE: 82 BPM

## 2022-04-05 DIAGNOSIS — E78.00 ELEVATED CHOLESTEROL: ICD-10-CM

## 2022-04-05 DIAGNOSIS — I10 ESSENTIAL HYPERTENSION: Primary | ICD-10-CM

## 2022-04-05 LAB
ALBUMIN SERPL-MCNC: 4.7 G/DL (ref 3.5–5.2)
ALBUMIN/GLOB SERPL: 2 G/DL
ALP SERPL-CCNC: 118 U/L (ref 39–117)
ALT SERPL W P-5'-P-CCNC: 21 U/L (ref 1–33)
ANION GAP SERPL CALCULATED.3IONS-SCNC: 9.7 MMOL/L (ref 5–15)
AST SERPL-CCNC: 25 U/L (ref 1–32)
BILIRUB SERPL-MCNC: 0.3 MG/DL (ref 0–1.2)
BUN SERPL-MCNC: 17 MG/DL (ref 8–23)
BUN/CREAT SERPL: 16 (ref 7–25)
CALCIUM SPEC-SCNC: 9.9 MG/DL (ref 8.6–10.5)
CHLORIDE SERPL-SCNC: 103 MMOL/L (ref 98–107)
CHOLEST SERPL-MCNC: 288 MG/DL (ref 0–200)
CO2 SERPL-SCNC: 27.3 MMOL/L (ref 22–29)
CREAT SERPL-MCNC: 1.06 MG/DL (ref 0.57–1)
DEPRECATED RDW RBC AUTO: 43.7 FL (ref 37–54)
EGFRCR SERPLBLD CKD-EPI 2021: 52.9 ML/MIN/1.73
ERYTHROCYTE [DISTWIDTH] IN BLOOD BY AUTOMATED COUNT: 12.9 % (ref 12.3–15.4)
GLOBULIN UR ELPH-MCNC: 2.3 GM/DL
GLUCOSE SERPL-MCNC: 84 MG/DL (ref 65–99)
HCT VFR BLD AUTO: 37.2 % (ref 34–46.6)
HDLC SERPL-MCNC: 59 MG/DL (ref 40–60)
HGB BLD-MCNC: 12.4 G/DL (ref 12–15.9)
LDLC SERPL CALC-MCNC: 177 MG/DL (ref 0–100)
LDLC/HDLC SERPL: 2.96 {RATIO}
MCH RBC QN AUTO: 30.5 PG (ref 26.6–33)
MCHC RBC AUTO-ENTMCNC: 33.3 G/DL (ref 31.5–35.7)
MCV RBC AUTO: 91.4 FL (ref 79–97)
PLATELET # BLD AUTO: 211 10*3/MM3 (ref 140–450)
PMV BLD AUTO: 9.7 FL (ref 6–12)
POTASSIUM SERPL-SCNC: 4.5 MMOL/L (ref 3.5–5.2)
PROT SERPL-MCNC: 7 G/DL (ref 6–8.5)
RBC # BLD AUTO: 4.07 10*6/MM3 (ref 3.77–5.28)
SODIUM SERPL-SCNC: 140 MMOL/L (ref 136–145)
TRIGL SERPL-MCNC: 273 MG/DL (ref 0–150)
TSH SERPL DL<=0.05 MIU/L-ACNC: 1.08 UIU/ML (ref 0.27–4.2)
VLDLC SERPL-MCNC: 52 MG/DL (ref 5–40)
WBC NRBC COR # BLD: 6.34 10*3/MM3 (ref 3.4–10.8)

## 2022-04-05 PROCEDURE — 80053 COMPREHEN METABOLIC PANEL: CPT | Performed by: FAMILY MEDICINE

## 2022-04-05 PROCEDURE — 36415 COLL VENOUS BLD VENIPUNCTURE: CPT | Performed by: FAMILY MEDICINE

## 2022-04-05 PROCEDURE — 84443 ASSAY THYROID STIM HORMONE: CPT | Performed by: FAMILY MEDICINE

## 2022-04-05 PROCEDURE — 80061 LIPID PANEL: CPT | Performed by: FAMILY MEDICINE

## 2022-04-05 PROCEDURE — 99213 OFFICE O/P EST LOW 20 MIN: CPT | Performed by: FAMILY MEDICINE

## 2022-04-05 PROCEDURE — 85027 COMPLETE CBC AUTOMATED: CPT | Performed by: FAMILY MEDICINE

## 2022-04-05 NOTE — PROGRESS NOTES
Chief Complaint  Back Pain (Chronic, follow up)    Subjective     {Problem List  Visit Diagnosis   Encounters  Notes  Medications  Labs  Result Review Imaging  Media :23}     Paige Cantu presents to [facility]  --TOLERATING BLOOD PRESSURE MEDICATION WITHOUT APPARENT SIDE EFFECTS.  STATES HER BP IS IN /70 RANGE AT HOME.    --LAST LIPIDS WERE BORDERLINE, DUE FOR A RECHECK         No Known Allergies     Health Maintenance Due   Topic Date Due   • DXA SCAN  Never done   • ZOSTER VACCINE (1 of 2) Never done   • Pneumococcal Vaccine 65+ (2 of 2 - PPSV23) 08/11/2016   • TDAP/TD VACCINES (2 - Tdap) 10/07/2020   • ANNUAL WELLNESS VISIT  Never done   • LIPID PANEL  07/01/2021   • COVID-19 Vaccine (3 - Booster for Moderna series) 07/23/2021        Current Outpatient Medications on File Prior to Visit   Medication Sig   • anastrozole (ARIMIDEX) 1 MG tablet    • hydroCHLOROthiazide (HYDRODIURIL) 25 MG tablet Take 1 tablet by mouth once daily   • HYDROcodone-acetaminophen (NORCO) 5-325 MG per tablet Take 1 tablet by mouth Daily As Needed for Moderate Pain .   • lisinopril (PRINIVIL,ZESTRIL) 10 MG tablet Take 1 tablet by mouth Daily.     No current facility-administered medications on file prior to visit.       Immunization History   Administered Date(s) Administered   • COVID-19 (MODERNA) 1st, 2nd, 3rd Dose Only 01/26/2021, 02/23/2021   • Fluzone High Dose =>65 Years (Vaxcare ONLY) 01/29/2018, 12/28/2018   • Fluzone High-Dose 65+yrs 01/05/2022   • Influenza TIV (IM) 10/26/2012   • Influenza, Unspecified 09/30/2020   • Pneumococcal Conjugate 13-Valent (PCV13) 08/11/2015   • Td 10/07/2010       Review of Systems   Constitutional: Negative for activity change, appetite change, chills, fatigue and fever.   HENT: Negative for congestion, ear pain, rhinorrhea and sore throat.    Respiratory: Negative for cough and shortness of breath.    Cardiovascular: Negative for chest pain,  "palpitations and leg swelling.   Gastrointestinal: Negative for abdominal pain, constipation, diarrhea, nausea and vomiting.   Musculoskeletal: Negative for arthralgias and myalgias.   Neurological: Negative for headache.        Objective     /92 (BP Location: Left arm, Patient Position: Sitting)   Pulse 82   Temp 98.2 °F (36.8 °C) (Oral)   Ht 158.1 cm (62.25\")   Wt 66.2 kg (146 lb)   BMI 26.49 kg/m²       Physical Exam  Vitals and nursing note reviewed.   Constitutional:       General: She is not in acute distress.     Appearance: Normal appearance.   Cardiovascular:      Rate and Rhythm: Normal rate and regular rhythm.      Heart sounds: Normal heart sounds. No murmur heard.  Pulmonary:      Effort: Pulmonary effort is normal.      Breath sounds: Normal breath sounds.   Abdominal:      Palpations: Abdomen is soft.      Tenderness: There is no abdominal tenderness.   Musculoskeletal:      Cervical back: Neck supple.      Right lower leg: No edema.      Left lower leg: No edema.   Lymphadenopathy:      Cervical: No cervical adenopathy.   Neurological:      General: No focal deficit present.      Mental Status: She is alert.      Cranial Nerves: No cranial nerve deficit.      Coordination: Coordination normal.      Gait: Gait normal.   Psychiatric:         Mood and Affect: Mood normal.         Behavior: Behavior normal.         Result Review :                             Assessment and Plan      Diagnoses and all orders for this visit:    1. Essential hypertension (Primary)  Assessment & Plan:  Hypertension is improving with treatment.  Continue current treatment regimen.  Dietary sodium restriction.  Regular aerobic exercise.  Continue current medications.  Blood pressure will be reassessed at the next regular appointment   SHE WILL CONTINUE TO MONITOR HER BP AT HOME .    Orders:  -     CBC (No Diff)  -     Comprehensive Metabolic Panel  -     TSH    2. Elevated cholesterol  Assessment & Plan:  Lipid " abnormalities are improving with lifestyle modifications.  Nutritional counseling was provided.  Lipids will be reassessed in 6 months.    Orders:  -     Lipid Panel          Follow Up     Return in about 3 months (around 7/5/2022).    Patient was given instructions and counseling regarding her condition or for health maintenance advice. Please see specific information pulled into the AVS if appropriate.

## 2022-04-05 NOTE — ASSESSMENT & PLAN NOTE
Hypertension is improving with treatment.  Continue current treatment regimen.  Dietary sodium restriction.  Regular aerobic exercise.  Continue current medications.  Blood pressure will be reassessed at the next regular appointment   SHE WILL CONTINUE TO MONITOR HER BP AT HOME .

## 2022-04-07 DIAGNOSIS — E78.00 ELEVATED CHOLESTEROL: Primary | ICD-10-CM

## 2022-04-13 ENCOUNTER — TELEPHONE (OUTPATIENT)
Dept: FAMILY MEDICINE CLINIC | Age: 82
End: 2022-04-13

## 2022-04-13 DIAGNOSIS — M54.42 CHRONIC LEFT-SIDED LOW BACK PAIN WITH LEFT-SIDED SCIATICA: ICD-10-CM

## 2022-04-13 DIAGNOSIS — G89.29 CHRONIC LEFT-SIDED LOW BACK PAIN WITH LEFT-SIDED SCIATICA: ICD-10-CM

## 2022-04-13 RX ORDER — HYDROCODONE BITARTRATE AND ACETAMINOPHEN 5; 325 MG/1; MG/1
1 TABLET ORAL DAILY PRN
Qty: 10 TABLET | Refills: 0 | Status: SHIPPED | OUTPATIENT
Start: 2022-04-13 | End: 2022-04-21 | Stop reason: SDUPTHER

## 2022-04-13 NOTE — TELEPHONE ENCOUNTER
Caller: Paige Cantu    Relationship: Self    Best call back number: [phone]    Requested Prescriptions:   Requested Prescriptions     Pending Prescriptions Disp Refills   • HYDROcodone-acetaminophen (NORCO) 5-325 MG per tablet 30 tablet 0     Sig: Take 1 tablet by mouth Daily As Needed for Moderate Pain .        Pharmacy where request should be sent: [facility] #[id number] - [place], [place] - [address] AT [facility] [address] [facility] - [phone] [facility] [phone] FX     Does the patient have less than a 3 day supply:  [x] Yes  [] No    Vianey Leyva Rep   04/13/22 09:23 EDT

## 2022-04-13 NOTE — TELEPHONE ENCOUNTER
Rx Refill Note  Requested Prescriptions     Pending Prescriptions Disp Refills   • HYDROcodone-acetaminophen (NORCO) 5-325 MG per tablet 30 tablet 0     Sig: Take 1 tablet by mouth Daily As Needed for Moderate Pain .      Last office visit with prescribing clinician: 4/5/2022      Next office visit with prescribing clinician: 7/14/2022  Last fill: 03/17/22, #30  Tox: 04/01/21  Dr Dougherty is out of town, sent to on call Dr Brendan Parra LPN  04/13/22, 10:20 EDT

## 2022-04-21 DIAGNOSIS — M54.42 CHRONIC LEFT-SIDED LOW BACK PAIN WITH LEFT-SIDED SCIATICA: ICD-10-CM

## 2022-04-21 DIAGNOSIS — G89.29 CHRONIC LEFT-SIDED LOW BACK PAIN WITH LEFT-SIDED SCIATICA: ICD-10-CM

## 2022-04-21 RX ORDER — HYDROCODONE BITARTRATE AND ACETAMINOPHEN 5; 325 MG/1; MG/1
1 TABLET ORAL DAILY PRN
Qty: 30 TABLET | Refills: 0 | Status: SHIPPED | OUTPATIENT
Start: 2022-04-21 | End: 2022-05-19 | Stop reason: SDUPTHER

## 2022-04-21 NOTE — TELEPHONE ENCOUNTER
Caller: Paige Cantu    Relationship: Self    Best call back number: [phone]    Requested Prescriptions:   Requested Prescriptions     Pending Prescriptions Disp Refills   • HYDROcodone-acetaminophen (NORCO) 5-325 MG per tablet 10 tablet 0     Sig: Take 1 tablet by mouth Daily As Needed for Moderate Pain .      Pharmacy where request should be sent: [id number] - [place], [place] - [address] [facility] AT [facility] [address] [facility] - [phone] [facility] [phone] FX     Additional details provided by patient: PATIENT IS FULLY OUT OF MEDICATION.    Does the patient have less than a 3 day supply:  [x] Yes  [] No    Vianey Sigala Rep   04/21/22 12:44 EDT

## 2022-04-21 NOTE — TELEPHONE ENCOUNTER
Rx Refill Note  Requested Prescriptions     Pending Prescriptions Disp Refills   • HYDROcodone-acetaminophen (NORCO) 5-325 MG per tablet 10 tablet 0     Sig: Take 1 tablet by mouth Daily As Needed for Moderate Pain .      Last office visit with prescribing clinician: 4/5/2022      Next office visit with prescribing clinician: 7/14/2022   Nathalie Sales LPN  04/21/22, 13:06 EDT     -4/13/22#10 BY ON CALL

## 2022-04-22 ENCOUNTER — TELEPHONE (OUTPATIENT)
Dept: FAMILY MEDICINE CLINIC | Age: 82
End: 2022-04-22

## 2022-04-22 NOTE — TELEPHONE ENCOUNTER
Pt said she has been outside working in the yard and cutting the grass.  She said she is starting to feel some better. Advised her to take her allergy tablets and if she starts to run a temp or doesn't improve, call and we will get her an appt to be seen.

## 2022-04-22 NOTE — TELEPHONE ENCOUNTER
Caller: Paige Cantu    Relationship: Self    Best call back number: [phone]    What medication are you requesting: SOMETHING FOR HOARSENESS AND SORE THROAT    What are your current symptoms: SORE THROAT, HOARSE, RUNNY NOSE    If a prescription is needed, what is your preferred pharmacy and phone number: [facility][id number] - [place], [place] - [address] [facility] AT [facility] [address] [facility] - [phone] [facility] [phone] FX

## 2022-04-23 DIAGNOSIS — I10 ESSENTIAL HYPERTENSION: ICD-10-CM

## 2022-04-25 RX ORDER — LISINOPRIL 10 MG/1
10 TABLET ORAL DAILY
Qty: 90 TABLET | Refills: 1 | Status: SHIPPED | OUTPATIENT
Start: 2022-04-25 | End: 2022-12-28 | Stop reason: SDUPTHER

## 2022-05-19 DIAGNOSIS — G89.29 CHRONIC LEFT-SIDED LOW BACK PAIN WITH LEFT-SIDED SCIATICA: ICD-10-CM

## 2022-05-19 DIAGNOSIS — M54.42 CHRONIC LEFT-SIDED LOW BACK PAIN WITH LEFT-SIDED SCIATICA: ICD-10-CM

## 2022-05-19 NOTE — TELEPHONE ENCOUNTER
Rx Refill Note  Requested Prescriptions     Pending Prescriptions Disp Refills   • HYDROcodone-acetaminophen (NORCO) 5-325 MG per tablet 30 tablet 0     Sig: Take 1 tablet by mouth Daily As Needed for Moderate Pain .      Last office visit with prescribing clinician: 4/5/2022      Next office visit with prescribing clinician: 7/14/2022  Last fill sent: 04/21/22, #30  Tox: 04/01/2021    Dorys Parra LPN  05/19/22, 15:13 EDT

## 2022-05-19 NOTE — TELEPHONE ENCOUNTER
Caller: Paige Cantu    Relationship: Self    Best call back number:[phone]    Requested Prescriptions:   Requested Prescriptions     Pending Prescriptions Disp Refills   • HYDROcodone-acetaminophen (NORCO) 5-325 MG per tablet 30 tablet 0     Sig: Take 1 tablet by mouth Daily As Needed for Moderate Pain .        Pharmacy where request should be sent: [facility] #[id number] - [place], [place] - [address] [facility] [address] AT [facility] [address] [facility] - [phone] [facility] [phone] FX     Additional details provided by patient:     Does the patient have less than a 3 day supply:  [x] Yes  [] No    Vianey Dave Rep   05/19/22 13:10 EDT

## 2022-05-20 RX ORDER — HYDROCODONE BITARTRATE AND ACETAMINOPHEN 5; 325 MG/1; MG/1
1 TABLET ORAL DAILY PRN
Qty: 30 TABLET | Refills: 0 | Status: SHIPPED | OUTPATIENT
Start: 2022-05-20 | End: 2022-06-16 | Stop reason: SDUPTHER

## 2022-06-16 ENCOUNTER — TELEPHONE (OUTPATIENT)
Dept: FAMILY MEDICINE CLINIC | Age: 82
End: 2022-06-16

## 2022-06-16 DIAGNOSIS — G89.29 CHRONIC LEFT-SIDED LOW BACK PAIN WITH LEFT-SIDED SCIATICA: ICD-10-CM

## 2022-06-16 DIAGNOSIS — M54.42 CHRONIC LEFT-SIDED LOW BACK PAIN WITH LEFT-SIDED SCIATICA: ICD-10-CM

## 2022-06-16 NOTE — TELEPHONE ENCOUNTER
Rx Refill Note  Requested Prescriptions     Pending Prescriptions Disp Refills   • HYDROcodone-acetaminophen (NORCO) 5-325 MG per tablet 30 tablet 0     Sig: Take 1 tablet by mouth Daily As Needed for Moderate Pain .      Last office visit with prescribing clinician: 4/5/2022      Next office visit with prescribing clinician: 7/14/2022  Last refill sent: 05/20/22, #30    Dorys Parra LPN  06/16/22, 14:41 EDT

## 2022-06-16 NOTE — TELEPHONE ENCOUNTER
Caller: Paige Cantu    Relationship: Self    Best call back number: [phone]    Requested Prescriptions:   Requested Prescriptions     Pending Prescriptions Disp Refills   • HYDROcodone-acetaminophen (NORCO) 5-325 MG per tablet 30 tablet 0     Sig: Take 1 tablet by mouth Daily As Needed for Moderate Pain .        Pharmacy where request should be sent: [facility] #[id number] - [place], [place] - [address] AT [facility] [address] [facility] - [phone] [facility] [phone] FX     Does the patient have less than a 3 day supply:  [x] Yes  [] No    Vianey Leyva Rep   06/16/22 12:38 EDT

## 2022-06-17 NOTE — TELEPHONE ENCOUNTER
PATIENT CALLED IN TODAY STATING THAT SHE IS TOTALLY OUT OF THIS MEDICATION AND NEEDS THIS FILLED TODAY.

## 2022-06-20 NOTE — TELEPHONE ENCOUNTER
Caller: Paige Cantu    Relationship to patient: Self    Best call back number: [phone]    Patient is needing: PATIENT CALLED STATING SHE WOULD LIKE TO KNOW THE STATUS OF THIS REFILL REQUEST. PLEASE ADVISE THANK YOU.

## 2022-06-21 ENCOUNTER — TELEPHONE (OUTPATIENT)
Dept: FAMILY MEDICINE CLINIC | Age: 82
End: 2022-06-21

## 2022-06-21 RX ORDER — HYDROCODONE BITARTRATE AND ACETAMINOPHEN 5; 325 MG/1; MG/1
1 TABLET ORAL DAILY PRN
Qty: 30 TABLET | Refills: 0 | Status: SHIPPED | OUTPATIENT
Start: 2022-06-21 | End: 2022-07-19 | Stop reason: SDUPTHER

## 2022-06-21 NOTE — TELEPHONE ENCOUNTER
Caller: Paige Cantu    Relationship: Self    Best call back number: [phone]    What is the best time to reach you: ANY    Who are you requesting to speak with (clinical staff, provider,  specific staff member): CLINICAL STAFF    What was the call regarding: PATIENT CALLED WANTING TO SPEAK TO DR SHERDIAN NURSE. PATIENT DID NOT DISCLOSE ANY FURTHER INFORMATION.    Do you require a callback: YES

## 2022-07-18 ENCOUNTER — TELEPHONE (OUTPATIENT)
Dept: FAMILY MEDICINE CLINIC | Age: 82
End: 2022-07-18

## 2022-07-18 NOTE — TELEPHONE ENCOUNTER
----- Message from Louisa Coronado LPN sent at 7/18/2022  8:12 AM EDT -----      ----- Message -----  From: SYSTEM  Sent: 7/17/2022   1:34 AM EDT  To: [facility] [place] [facility] [place]

## 2022-07-19 ENCOUNTER — LAB (OUTPATIENT)
Dept: LAB | Facility: HOSPITAL | Age: 82
End: 2022-07-19

## 2022-07-19 DIAGNOSIS — G89.29 CHRONIC LEFT-SIDED LOW BACK PAIN WITH LEFT-SIDED SCIATICA: ICD-10-CM

## 2022-07-19 DIAGNOSIS — M54.42 CHRONIC LEFT-SIDED LOW BACK PAIN WITH LEFT-SIDED SCIATICA: ICD-10-CM

## 2022-07-19 DIAGNOSIS — E78.00 ELEVATED CHOLESTEROL: ICD-10-CM

## 2022-07-19 LAB
CHOLEST SERPL-MCNC: 262 MG/DL (ref 0–200)
HDLC SERPL-MCNC: 41 MG/DL (ref 40–60)
LDLC SERPL CALC-MCNC: 128 MG/DL (ref 0–100)
LDLC/HDLC SERPL: 2.89 {RATIO}
TRIGL SERPL-MCNC: 512 MG/DL (ref 0–150)
VLDLC SERPL-MCNC: 93 MG/DL (ref 5–40)

## 2022-07-19 PROCEDURE — 80061 LIPID PANEL: CPT

## 2022-07-19 PROCEDURE — 36415 COLL VENOUS BLD VENIPUNCTURE: CPT

## 2022-07-19 RX ORDER — HYDROCODONE BITARTRATE AND ACETAMINOPHEN 5; 325 MG/1; MG/1
1 TABLET ORAL DAILY PRN
Qty: 30 TABLET | Refills: 0 | Status: SHIPPED | OUTPATIENT
Start: 2022-07-19 | End: 2022-08-18 | Stop reason: SDUPTHER

## 2022-07-19 RX ORDER — ROPINIROLE 0.25 MG/1
0.25 TABLET, FILM COATED ORAL NIGHTLY
COMMUNITY
End: 2022-07-19 | Stop reason: SDUPTHER

## 2022-07-19 RX ORDER — ROPINIROLE 0.25 MG/1
0.25 TABLET, FILM COATED ORAL NIGHTLY
Qty: 90 TABLET | Refills: 0 | Status: SHIPPED | OUTPATIENT
Start: 2022-07-19 | End: 2022-10-17 | Stop reason: SDUPTHER

## 2022-07-19 NOTE — TELEPHONE ENCOUNTER
Rx Refill Note  Requested Prescriptions     Pending Prescriptions Disp Refills   • HYDROcodone-acetaminophen (NORCO) 5-325 MG per tablet 30 tablet 0     Sig: Take 1 tablet by mouth Daily As Needed for Moderate Pain .   • rOPINIRole (REQUIP) 0.25 MG tablet 90 tablet 0     Sig: Take 1 tablet by mouth Every Night. Take 1 hour before bedtime for restless legs      Last office visit with prescribing clinician: 4/5/2022      Next office visit with prescribing clinician: 9/8/2022  Last refill of hydrocodone was 06/21/22, #30  Last refill of ropinirole was 05/05/2021 for restless legs    Dorys Parra LPN  07/19/22, 15:45 EDTRx Refill Note

## 2022-07-21 DIAGNOSIS — I10 ESSENTIAL HYPERTENSION: ICD-10-CM

## 2022-07-21 DIAGNOSIS — E78.00 ELEVATED CHOLESTEROL: Primary | ICD-10-CM

## 2022-07-21 RX ORDER — ATORVASTATIN CALCIUM 20 MG/1
20 TABLET, FILM COATED ORAL DAILY
Qty: 90 TABLET | Refills: 1 | Status: SHIPPED | OUTPATIENT
Start: 2022-07-21 | End: 2022-10-17 | Stop reason: SDUPTHER

## 2022-08-18 DIAGNOSIS — M54.42 CHRONIC LEFT-SIDED LOW BACK PAIN WITH LEFT-SIDED SCIATICA: ICD-10-CM

## 2022-08-18 DIAGNOSIS — G89.29 CHRONIC LEFT-SIDED LOW BACK PAIN WITH LEFT-SIDED SCIATICA: ICD-10-CM

## 2022-08-18 RX ORDER — HYDROCODONE BITARTRATE AND ACETAMINOPHEN 5; 325 MG/1; MG/1
1 TABLET ORAL DAILY PRN
Qty: 30 TABLET | Refills: 0 | Status: SHIPPED | OUTPATIENT
Start: 2022-08-18 | End: 2022-09-19 | Stop reason: SDUPTHER

## 2022-08-18 NOTE — TELEPHONE ENCOUNTER
Caller: Paige Cantu    Relationship: Self    Best call back number: [phone]     Requested Prescriptions:   Requested Prescriptions     Pending Prescriptions Disp Refills   • HYDROcodone-acetaminophen (NORCO) 5-325 MG per tablet 30 tablet 0     Sig: Take 1 tablet by mouth Daily As Needed for Moderate Pain .        Pharmacy where request should be sent: [facility] #[id number] - [place], [place] - [address] AT [facility] [address] [facility] - [phone] [facility] [phone] FX     Additional details provided by patient:     Does the patient have less than a 3 day supply:  [x] Yes  [] No    Vianey Matos Rep   08/18/22 13:33 EDT

## 2022-08-18 NOTE — TELEPHONE ENCOUNTER
Rx Refill Note  Requested Prescriptions     Pending Prescriptions Disp Refills   • HYDROcodone-acetaminophen (NORCO) 5-325 MG per tablet 30 tablet 0     Sig: Take 1 tablet by mouth Daily As Needed for Moderate Pain .      Last office visit with prescribing clinician: 4/5/2022      Next office visit with prescribing clinician: 9/8/2022     Nathalie Sales LPN  08/18/22, 13:35 EDT       -7/19/22 #30

## 2022-08-23 ENCOUNTER — TELEPHONE (OUTPATIENT)
Dept: FAMILY MEDICINE CLINIC | Age: 82
End: 2022-08-23

## 2022-08-23 NOTE — TELEPHONE ENCOUNTER
Pt said her son tested positive for covid on Sunday. She was around him.  She is wanting to know if she needs to come in and be tested.    You can test 3-5 days after exposure. Pt said she is not having any symptoms.  Advised her she can do a home test or if she would like to be seen we could set her up a in office appt or telehealth.   Reviewed CDC guidelines with her.

## 2022-08-23 NOTE — TELEPHONE ENCOUNTER
Caller: Paige Cantu    Relationship to patient: Self    Best call back number: [phone]    Date of exposure: 08/19/2022        COVID19 symptoms: NONE AT THIS TIME    Date of initial quarantine: 08/21/2022    Additional information or concerns: PATIENT NEEDS TO KNOW IF SHE NEEDS TO BE TESTED AND IF SO CAN AN APPOINTMENT BE SCHEDULED FOR THE TEST PLEASE CONTACT AND ADVISE

## 2022-08-24 ENCOUNTER — TELEPHONE (OUTPATIENT)
Dept: FAMILY MEDICINE CLINIC | Age: 82
End: 2022-08-24

## 2022-08-24 NOTE — TELEPHONE ENCOUNTER
Caller: Paige Cantu    Relationship: Self    Best call back number: [phone]    What was the call regarding:PT CALLING MERARY BACK TO LET HER KNOW SHE TOOK A COVID TEST TODAY AND IT WAS NEGATIVE

## 2022-08-25 NOTE — TELEPHONE ENCOUNTER
Pt said she feels fine. She did a home covid test and it was negative.  She said she just checked it to be on the cautious side.

## 2022-08-25 NOTE — TELEPHONE ENCOUNTER
Talked to pt, she said she doesn't have any symptoms. She had been around her son a couple days ago that was positive for covid and she just checked it to be cautious.

## 2022-09-08 ENCOUNTER — OFFICE VISIT (OUTPATIENT)
Dept: FAMILY MEDICINE CLINIC | Age: 82
End: 2022-09-08

## 2022-09-08 VITALS
BODY MASS INDEX: 25.25 KG/M2 | WEIGHT: 137.2 LBS | HEART RATE: 79 BPM | HEIGHT: 62 IN | DIASTOLIC BLOOD PRESSURE: 58 MMHG | SYSTOLIC BLOOD PRESSURE: 128 MMHG

## 2022-09-08 DIAGNOSIS — G89.29 CHRONIC LEFT-SIDED LOW BACK PAIN WITH LEFT-SIDED SCIATICA: Primary | ICD-10-CM

## 2022-09-08 DIAGNOSIS — Z79.899 HIGH RISK MEDICATION USE: ICD-10-CM

## 2022-09-08 DIAGNOSIS — M54.42 CHRONIC LEFT-SIDED LOW BACK PAIN WITH LEFT-SIDED SCIATICA: Primary | ICD-10-CM

## 2022-09-08 LAB
AMPHET+METHAMPHET UR QL: NEGATIVE
AMPHETAMINES UR QL: NEGATIVE
BARBITURATES UR QL SCN: NEGATIVE
BENZODIAZ UR QL SCN: NEGATIVE
BUPRENORPHINE SERPL-MCNC: NEGATIVE NG/ML
CANNABINOIDS SERPL QL: NEGATIVE
COCAINE UR QL: NEGATIVE
EXPIRATION DATE: NORMAL
Lab: NORMAL
MDMA UR QL SCN: NEGATIVE
METHADONE UR QL SCN: NEGATIVE
OPIATES UR QL: NEGATIVE
OXYCODONE UR QL SCN: NEGATIVE
PCP UR QL SCN: NEGATIVE

## 2022-09-08 PROCEDURE — 99213 OFFICE O/P EST LOW 20 MIN: CPT | Performed by: FAMILY MEDICINE

## 2022-09-08 PROCEDURE — 80305 DRUG TEST PRSMV DIR OPT OBS: CPT | Performed by: FAMILY MEDICINE

## 2022-09-08 NOTE — ASSESSMENT & PLAN NOTE
STABLE ON CURRENT MEDICATION.  AZRA REVIEWED.  TOX SCREEN IS UP TO DATE.  THE CONTINUED USE OF A CONTROLLED SUBSTANCE IS NECESSARY FOR THIS PATIENT TO HAVE A NEAR NORMAL QUALITY OF LIFE AND WILL BE REVIEWED AT THE NEXT ROUTINE VISIT.  EXAM IS UNCHANGED, SYMPTOMS ARE STABLE, CONTINUE SAME

## 2022-09-08 NOTE — PROGRESS NOTES
Chief Complaint  No chief complaint on file.    Subjective          Pagie Cantu presents to [facility]  --CHRONIC LEFT LOWER BACK PAIN WHICH RADIATES DOWN THE LEFT LEG AND SOME ELEMENTS OF RLS.  CONTINUES ON ROUTINE NARCOTICS AND REQUIP WITH FAIR RELIEF OF SYMPTOMS.          No Known Allergies     Health Maintenance Due   Topic Date Due   • DXA SCAN  Never done   • ZOSTER VACCINE (1 of 2) Never done   • Pneumococcal Vaccine 65+ (2 - PPSV23 or PCV20) 08/11/2016   • TDAP/TD VACCINES (2 - Tdap) 10/07/2020   • ANNUAL WELLNESS VISIT  Never done   • COVID-19 Vaccine (3 - Booster for Moderna series) 07/23/2021        Current Outpatient Medications on File Prior to Visit   Medication Sig   • anastrozole (ARIMIDEX) 1 MG tablet    • atorvastatin (Lipitor) 20 MG tablet Take 1 tablet by mouth Daily.   • hydroCHLOROthiazide (HYDRODIURIL) 25 MG tablet Take 1 tablet by mouth once daily   • HYDROcodone-acetaminophen (NORCO) 5-325 MG per tablet Take 1 tablet by mouth Daily As Needed for Moderate Pain .   • lisinopril (PRINIVIL,ZESTRIL) 10 MG tablet TAKE 1 TABLET BY MOUTH DAILY   • rOPINIRole (REQUIP) 0.25 MG tablet Take 1 tablet by mouth Every Night. Take 1 hour before bedtime for restless legs     No current facility-administered medications on file prior to visit.       Immunization History   Administered Date(s) Administered   • COVID-19 (MODERNA) 1st, 2nd, 3rd Dose Only 01/26/2021, 02/23/2021   • Fluzone High Dose =>65 Years (Vaxcare ONLY) 01/29/2018, 12/28/2018   • Fluzone High-Dose 65+yrs 01/05/2022   • Influenza TIV (IM) 10/26/2012   • Influenza, Unspecified 09/30/2020   • Pneumococcal Conjugate 13-Valent (PCV13) 08/11/2015   • Td 10/07/2010       Review of Systems   Constitutional: Negative for activity change, appetite change, chills, fatigue and fever.   HENT: Negative for congestion, ear pain, rhinorrhea and sore throat.    Respiratory: Negative for cough and shortness of breath.   "  Cardiovascular: Negative for chest pain, palpitations and leg swelling.   Gastrointestinal: Negative for abdominal pain, constipation, diarrhea, nausea and vomiting.   Musculoskeletal: Negative for arthralgias and myalgias.   Neurological: Negative for headache.        Objective     /58 (BP Location: Left arm, Patient Position: Sitting)   Pulse 79   Ht 158.1 cm (62.25\")   Wt 62.2 kg (137 lb 3.2 oz)   BMI 24.89 kg/m²       Physical Exam  Vitals and nursing note reviewed.   Constitutional:       General: She is not in acute distress.     Appearance: Normal appearance.   Cardiovascular:      Rate and Rhythm: Normal rate and regular rhythm.      Heart sounds: Normal heart sounds. No murmur heard.  Pulmonary:      Effort: Pulmonary effort is normal.      Breath sounds: Normal breath sounds.   Abdominal:      Palpations: Abdomen is soft.      Tenderness: There is no abdominal tenderness.   Musculoskeletal:      Cervical back: Neck supple.      Right lower leg: No edema.      Left lower leg: No edema.      Comments: BACK WITH GOOD ROM, POSITIVE SLR ON THE LEFT    Lymphadenopathy:      Cervical: No cervical adenopathy.   Neurological:      General: No focal deficit present.      Mental Status: She is alert.      Cranial Nerves: No cranial nerve deficit.      Coordination: Coordination normal.      Gait: Gait normal.   Psychiatric:         Mood and Affect: Mood normal.         Behavior: Behavior normal.         Result Review :                             Assessment and Plan      Diagnoses and all orders for this visit:    1. 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  AZRA REVIEWED.  TOX SCREEN IS UP TO DATE.  THE CONTINUED USE OF A CONTROLLED SUBSTANCE IS NECESSARY FOR THIS PATIENT TO HAVE A NEAR NORMAL QUALITY OF LIFE AND WILL BE REVIEWED AT THE NEXT ROUTINE VISIT.  EXAM IS UNCHANGED, SYMPTOMS ARE STABLE, CONTINUE SAME       2. High " risk medication use  -     POC Urine Drug Screen Premier Bio-Cup          Follow Up     Return in about 3 months (around 12/8/2022).    Patient was given instructions and counseling regarding her condition or for health maintenance advice. Please see specific information pulled into the AVS if appropriate.

## 2022-09-19 DIAGNOSIS — M54.42 CHRONIC LEFT-SIDED LOW BACK PAIN WITH LEFT-SIDED SCIATICA: ICD-10-CM

## 2022-09-19 DIAGNOSIS — G89.29 CHRONIC LEFT-SIDED LOW BACK PAIN WITH LEFT-SIDED SCIATICA: ICD-10-CM

## 2022-09-19 RX ORDER — HYDROCODONE BITARTRATE AND ACETAMINOPHEN 5; 325 MG/1; MG/1
1 TABLET ORAL DAILY PRN
Qty: 30 TABLET | Refills: 0 | Status: SHIPPED | OUTPATIENT
Start: 2022-09-19 | End: 2022-10-19 | Stop reason: SDUPTHER

## 2022-09-19 NOTE — TELEPHONE ENCOUNTER
Caller: Paige Cantu    Relationship: Self    Best call back number: [phone]    Requested Prescriptions:   Requested Prescriptions     Pending Prescriptions Disp Refills   • HYDROcodone-acetaminophen (NORCO) 5-325 MG per tablet 30 tablet 0     Sig: Take 1 tablet by mouth Daily As Needed for Moderate Pain.        Pharmacy where request should be sent: [facility] #[id number] - [place], [place] - [address] AT [facility] [address] [facility] - [phone] [facility] [phone] FX     Does the patient have less than a 3 day supply:  [x] Yes  [] No    Vianey CAT Rep   09/19/22 14:29 EDT

## 2022-09-19 NOTE — TELEPHONE ENCOUNTER
Rx Refill Note  Requested Prescriptions     Pending Prescriptions Disp Refills   • HYDROcodone-acetaminophen (NORCO) 5-325 MG per tablet 30 tablet 0     Sig: Take 1 tablet by mouth Daily As Needed for Moderate Pain.      Last office visit with prescribing clinician: 9/8/2022      Next office visit with prescribing clinician: 12/8/2022     Nathalie Sales LPN  09/19/22, 15:42 EDT     LF-8/18/22#30  UDS-9/8/22

## 2022-10-11 ENCOUNTER — TELEPHONE (OUTPATIENT)
Dept: FAMILY MEDICINE CLINIC | Age: 82
End: 2022-10-11

## 2022-10-11 NOTE — TELEPHONE ENCOUNTER
----- Message from Dorys Parra LPN sent at 10/3/2022  8:11 AM EDT -----      ----- Message -----  From: SYSTEM  Sent: 10/1/2022   1:42 AM EDT  To: [facility]

## 2022-10-13 ENCOUNTER — LAB (OUTPATIENT)
Dept: LAB | Facility: HOSPITAL | Age: 82
End: 2022-10-13

## 2022-10-13 DIAGNOSIS — E78.00 ELEVATED CHOLESTEROL: ICD-10-CM

## 2022-10-13 DIAGNOSIS — I10 ESSENTIAL HYPERTENSION: ICD-10-CM

## 2022-10-13 PROCEDURE — 80053 COMPREHEN METABOLIC PANEL: CPT

## 2022-10-13 PROCEDURE — 36415 COLL VENOUS BLD VENIPUNCTURE: CPT

## 2022-10-13 PROCEDURE — 80061 LIPID PANEL: CPT

## 2022-10-14 LAB
ALBUMIN SERPL-MCNC: 4 G/DL (ref 3.5–5.2)
ALBUMIN/GLOB SERPL: 1.9 G/DL
ALP SERPL-CCNC: 82 U/L (ref 39–117)
ALT SERPL W P-5'-P-CCNC: 15 U/L (ref 1–33)
ANION GAP SERPL CALCULATED.3IONS-SCNC: 7.2 MMOL/L (ref 5–15)
AST SERPL-CCNC: 18 U/L (ref 1–32)
BILIRUB SERPL-MCNC: 0.4 MG/DL (ref 0–1.2)
BUN SERPL-MCNC: 15 MG/DL (ref 8–23)
BUN/CREAT SERPL: 15 (ref 7–25)
CALCIUM SPEC-SCNC: 8.8 MG/DL (ref 8.6–10.5)
CHLORIDE SERPL-SCNC: 107 MMOL/L (ref 98–107)
CHOLEST SERPL-MCNC: 143 MG/DL (ref 0–200)
CO2 SERPL-SCNC: 25.8 MMOL/L (ref 22–29)
CREAT SERPL-MCNC: 1 MG/DL (ref 0.57–1)
EGFRCR SERPLBLD CKD-EPI 2021: 56.7 ML/MIN/1.73
GLOBULIN UR ELPH-MCNC: 2.1 GM/DL
GLUCOSE SERPL-MCNC: 93 MG/DL (ref 65–99)
HDLC SERPL-MCNC: 58 MG/DL (ref 40–60)
LDLC SERPL CALC-MCNC: 64 MG/DL (ref 0–100)
LDLC/HDLC SERPL: 1.07 {RATIO}
POTASSIUM SERPL-SCNC: 4.8 MMOL/L (ref 3.5–5.2)
PROT SERPL-MCNC: 6.1 G/DL (ref 6–8.5)
SODIUM SERPL-SCNC: 140 MMOL/L (ref 136–145)
TRIGL SERPL-MCNC: 116 MG/DL (ref 0–150)
VLDLC SERPL-MCNC: 21 MG/DL (ref 5–40)

## 2022-10-17 DIAGNOSIS — E78.00 ELEVATED CHOLESTEROL: ICD-10-CM

## 2022-10-17 RX ORDER — ROPINIROLE 0.25 MG/1
0.25 TABLET, FILM COATED ORAL NIGHTLY
Qty: 90 TABLET | Refills: 0 | Status: SHIPPED | OUTPATIENT
Start: 2022-10-17 | End: 2022-12-28 | Stop reason: SDUPTHER

## 2022-10-17 RX ORDER — ATORVASTATIN CALCIUM 20 MG/1
20 TABLET, FILM COATED ORAL DAILY
Qty: 90 TABLET | Refills: 1 | Status: SHIPPED | OUTPATIENT
Start: 2022-10-17 | End: 2022-12-28 | Stop reason: SDUPTHER

## 2022-10-17 NOTE — TELEPHONE ENCOUNTER
Caller: Robe Cantuethan PICKETT    Relationship: Self    Best call back number: [phone]    Requested Prescriptions:   Requested Prescriptions     Pending Prescriptions Disp Refills   • rOPINIRole (REQUIP) 0.25 MG tablet 90 tablet 0     Sig: Take 1 tablet by mouth Every Night. Take 1 hour before bedtime for restless legs   • atorvastatin (Lipitor) 20 MG tablet 90 tablet 1     Sig: Take 1 tablet by mouth Daily.        Pharm[facility] be sent: [id number] - [address] AT [facility] [address]  - [phone]  - [phone] FX     Additional details provided by patient:PATIENT HAS 3 DAYS LEFT OF MEDICATION    Does the patient have less than a 3 day supply:  [] Yes  [x] No    Vianey Parnell Rep   10/17/22 14:40 EDT

## 2022-10-19 ENCOUNTER — TELEPHONE (OUTPATIENT)
Dept: FAMILY MEDICINE CLINIC | Age: 82
End: 2022-10-19

## 2022-10-19 DIAGNOSIS — M54.42 CHRONIC LEFT-SIDED LOW BACK PAIN WITH LEFT-SIDED SCIATICA: ICD-10-CM

## 2022-10-19 DIAGNOSIS — G89.29 CHRONIC LEFT-SIDED LOW BACK PAIN WITH LEFT-SIDED SCIATICA: ICD-10-CM

## 2022-10-19 RX ORDER — HYDROCODONE BITARTRATE AND ACETAMINOPHEN 5; 325 MG/1; MG/1
1 TABLET ORAL DAILY PRN
Qty: 30 TABLET | Refills: 0 | Status: SHIPPED | OUTPATIENT
Start: 2022-10-19 | End: 2022-11-17 | Stop reason: SDUPTHER

## 2022-10-19 NOTE — TELEPHONE ENCOUNTER
Rx Refill Note  Requested Prescriptions     Pending Prescriptions Disp Refills   • HYDROcodone-acetaminophen (NORCO) 5-325 MG per tablet 30 tablet 0     Sig: Take 1 tablet by mouth Daily As Needed for Moderate Pain.      Last office visit with prescribing clinician: 9/8/2022      Next office visit with prescribing clinician: 12/8/2022     Nathalie Sales LPN  10/19/22, 15:12 EDT     LF-9/19/22 #30  UDS-9/8/22

## 2022-10-19 NOTE — TELEPHONE ENCOUNTER
Caller: Paige Cantu    Relationship: Self    Best call back number: [phone]    Requested Prescriptions:   Requested Prescriptions     Pending Prescriptions Disp Refills   • HYDROcodone-acetaminophen (NORCO) 5-325 MG per tablet 30 tablet 0     Sig: Take 1 tablet by mouth Daily As Needed for Moderate Pain.        Pharmacy where request should be sent: [facility] #[id number] - [place], [place] - [address] AT [facility] [address] [facility] - [phone] [facility] [phone] FX       Does the patient have less than a 3 day supply:  [x] Yes  [] No    Vianey Barth Rep   10/19/22 14:09 EDT

## 2022-11-15 RX ORDER — HYDROCHLOROTHIAZIDE 25 MG/1
TABLET ORAL
Qty: 90 TABLET | Refills: 0 | Status: SHIPPED | OUTPATIENT
Start: 2022-11-15 | End: 2023-01-17 | Stop reason: SDUPTHER

## 2022-11-17 DIAGNOSIS — M54.42 CHRONIC LEFT-SIDED LOW BACK PAIN WITH LEFT-SIDED SCIATICA: ICD-10-CM

## 2022-11-17 DIAGNOSIS — G89.29 CHRONIC LEFT-SIDED LOW BACK PAIN WITH LEFT-SIDED SCIATICA: ICD-10-CM

## 2022-11-17 RX ORDER — HYDROCODONE BITARTRATE AND ACETAMINOPHEN 5; 325 MG/1; MG/1
1 TABLET ORAL DAILY PRN
Qty: 30 TABLET | Refills: 0 | Status: SHIPPED | OUTPATIENT
Start: 2022-11-17 | End: 2022-12-16 | Stop reason: SDUPTHER

## 2022-11-17 NOTE — TELEPHONE ENCOUNTER
Caller: Paige Cantu    Relationship: Self    Best call back number: [phone]    Requested Prescriptions:   Requested Prescriptions     Pending Prescriptions Disp Refills   • HYDROcodone-acetaminophen (NORCO) 5-325 MG per tablet 30 tablet 0     Sig: Take 1 tablet by mouth Daily As Needed for Moderate Pain.        Pharmacy where request should be sent: [facility] #[id number] - [place], [place] - [address] AT [facility] [address] [facility] - [phone] [facility] [phone] FX       Does the patient have less than a 3 day supply:  [x] Yes  [] No    Vianey Artis Rep   11/17/22 13:28 EST

## 2022-12-08 ENCOUNTER — OFFICE VISIT (OUTPATIENT)
Dept: FAMILY MEDICINE CLINIC | Age: 82
End: 2022-12-08

## 2022-12-08 VITALS
WEIGHT: 140.6 LBS | BODY MASS INDEX: 25.88 KG/M2 | HEIGHT: 62 IN | SYSTOLIC BLOOD PRESSURE: 150 MMHG | DIASTOLIC BLOOD PRESSURE: 78 MMHG | HEART RATE: 75 BPM

## 2022-12-08 DIAGNOSIS — Z23 NEED FOR INFLUENZA VACCINATION: ICD-10-CM

## 2022-12-08 DIAGNOSIS — G89.29 CHRONIC LEFT-SIDED LOW BACK PAIN WITH LEFT-SIDED SCIATICA: ICD-10-CM

## 2022-12-08 DIAGNOSIS — I10 ESSENTIAL HYPERTENSION: ICD-10-CM

## 2022-12-08 DIAGNOSIS — E78.00 HIGH CHOLESTEROL: Primary | ICD-10-CM

## 2022-12-08 DIAGNOSIS — M54.42 CHRONIC LEFT-SIDED LOW BACK PAIN WITH LEFT-SIDED SCIATICA: ICD-10-CM

## 2022-12-08 PROCEDURE — G0008 ADMIN INFLUENZA VIRUS VAC: HCPCS | Performed by: FAMILY MEDICINE

## 2022-12-08 PROCEDURE — 99214 OFFICE O/P EST MOD 30 MIN: CPT | Performed by: FAMILY MEDICINE

## 2022-12-08 PROCEDURE — 90662 IIV NO PRSV INCREASED AG IM: CPT | Performed by: FAMILY MEDICINE

## 2022-12-08 NOTE — ASSESSMENT & PLAN NOTE
Hypertension is improving with treatment.  Continue current treatment regimen.  Regular aerobic exercise.  Continue current medications.   Blood pressure will be reassessed at the next regular appointment.  NOT AT GOAL TODAY, ADVISED TO CONTINUE MONITORING BP AT HOME

## 2022-12-08 NOTE — PROGRESS NOTES
Chief Complaint  Hypertension (3 months)    Subjective          Paige Cantu presents to [facility]  History of Present Illness  --TOLERATING BLOOD PRESSURE MEDICATION WITHOUT APPARENT SIDE EFFECTS.  STATES BP IS OK AT HOME  --LAST LIPIDS WERE OK, NO ISSUES WITH THE STATIN  --CONTINUES ON ROUTINE NARCOTICS AND ROPINEROL FOR CHRONIC LOW BACK PAIN, STABLE  --DUE FOR A FLU SHOT  Hypertension  Pertinent negatives include no chest pain, palpitations or shortness of breath.         No Known Allergies     Health Maintenance Due   Topic Date Due   • ZOSTER VACCINE (1 of 2) Never done   • Pneumococcal Vaccine 65+ (2 - PPSV23 if available, else PCV20) 08/11/2016   • TDAP/TD VACCINES (2 - Tdap) 10/07/2020   • COVID-19 Vaccine (3 - Booster for Moderna series) 04/20/2021   • ANNUAL WELLNESS VISIT  Never done   • INFLUENZA VACCINE  08/01/2022        Current Outpatient Medications on File Prior to Visit   Medication Sig   • anastrozole (ARIMIDEX) 1 MG tablet    • atorvastatin (Lipitor) 20 MG tablet Take 1 tablet by mouth Daily.   • hydroCHLOROthiazide (HYDRODIURIL) 25 MG tablet Take 1 tablet by mouth once daily   • HYDROcodone-acetaminophen (NORCO) 5-325 MG per tablet Take 1 tablet by mouth Daily As Needed for Moderate Pain.   • lisinopril (PRINIVIL,ZESTRIL) 10 MG tablet TAKE 1 TABLET BY MOUTH DAILY   • rOPINIRole (REQUIP) 0.25 MG tablet Take 1 tablet by mouth Every Night. Take 1 hour before bedtime for restless legs     No current facility-administered medications on file prior to visit.       Immunization History   Administered Date(s) Administered   • COVID-19 (MODERNA) 1st, 2nd, 3rd Dose Only 01/26/2021, 02/23/2021   • Fluzone High Dose =>65 Years (Vaxcare ONLY) 01/29/2018, 12/28/2018   • Fluzone High-Dose 65+yrs 01/05/2022   • Influenza TIV (IM) 10/26/2012   • Influenza, Unspecified 09/30/2020   • Pneumococcal Conjugate 13-Valent (PCV13) 08/11/2015   • Td 10/07/2010       Review of Systems  "  Constitutional: Negative for activity change, appetite change, chills, fatigue and fever.   HENT: Negative for congestion, ear pain, rhinorrhea and sore throat.    Respiratory: Negative for cough and shortness of breath.    Cardiovascular: Negative for chest pain, palpitations and leg swelling.   Gastrointestinal: Negative for abdominal pain, constipation, diarrhea, nausea and vomiting.   Musculoskeletal: Negative for arthralgias and myalgias.   Neurological: Negative for headache.        Objective     /78 (BP Location: Right arm, Patient Position: Sitting)   Pulse 75   Ht 158.1 cm (62.25\")   Wt 63.8 kg (140 lb 9.6 oz)   BMI 25.51 kg/m²       Physical Exam  Vitals and nursing note reviewed.   Constitutional:       General: She is not in acute distress.     Appearance: Normal appearance.   Cardiovascular:      Rate and Rhythm: Normal rate and regular rhythm.      Heart sounds: Normal heart sounds. No murmur heard.  Pulmonary:      Effort: Pulmonary effort is normal.      Breath sounds: Normal breath sounds.   Abdominal:      Palpations: Abdomen is soft.      Tenderness: There is no abdominal tenderness.   Musculoskeletal:      Cervical back: Neck supple.      Right lower leg: No edema.      Left lower leg: No edema.   Lymphadenopathy:      Cervical: No cervical adenopathy.   Neurological:      General: No focal deficit present.      Mental Status: She is alert.      Cranial Nerves: No cranial nerve deficit.      Coordination: Coordination normal.      Gait: Gait normal.   Psychiatric:         Mood and Affect: Mood normal.         Behavior: Behavior normal.         Result Review :                             Assessment and Plan      Diagnoses and all orders for this visit:    1. High cholesterol (Primary)  Assessment & Plan:  Lipid abnormalities are improving with treatment.  Nutritional counseling was provided.  Lipids will be reassessed in 6 months.      2. Essential hypertension  Assessment & " Plan:  Hypertension is improving with treatment.  Continue current treatment regimen.  Regular aerobic exercise.  Continue current medications.   Blood pressure will be reassessed at the next regular appointment.  NOT AT GOAL TODAY, ADVISED TO CONTINUE MONITORING BP AT HOME       3. Chronic left-sided low back pain with left-sided sciatica  Assessment & Plan:  STABLE ON CURRENT MEDICATION.  AZRA REVIEWED.  TOX SCREEN IS UP TO DATE.  THE CONTINUED USE OF A CONTROLLED SUBSTANCE IS NECESSARY FOR THIS PATIENT TO HAVE A NEAR NORMAL QUALITY OF LIFE AND WILL BE REVIEWED AT THE NEXT ROUTINE VISIT.            Follow Up     Return in about 3 months (around 3/8/2023).    Patient was given instructions and counseling regarding her condition or for health maintenance advice. Please see specific information pulled into the AVS if appropriate.

## 2022-12-16 ENCOUNTER — TELEPHONE (OUTPATIENT)
Dept: FAMILY MEDICINE CLINIC | Age: 82
End: 2022-12-16

## 2022-12-16 DIAGNOSIS — G89.29 CHRONIC LEFT-SIDED LOW BACK PAIN WITH LEFT-SIDED SCIATICA: ICD-10-CM

## 2022-12-16 DIAGNOSIS — M54.42 CHRONIC LEFT-SIDED LOW BACK PAIN WITH LEFT-SIDED SCIATICA: ICD-10-CM

## 2022-12-16 RX ORDER — HYDROCODONE BITARTRATE AND ACETAMINOPHEN 5; 325 MG/1; MG/1
1 TABLET ORAL DAILY PRN
Qty: 30 TABLET | Refills: 0 | Status: SHIPPED | OUTPATIENT
Start: 2022-12-16 | End: 2023-01-16 | Stop reason: SDUPTHER

## 2022-12-16 NOTE — TELEPHONE ENCOUNTER
Rx Refill Note  Requested Prescriptions     Pending Prescriptions Disp Refills   • HYDROcodone-acetaminophen (NORCO) 5-325 MG per tablet 30 tablet 0     Sig: Take 1 tablet by mouth Daily As Needed for Moderate Pain.      Last office visit with prescribing clinician: 12/8/2022   Last telemedicine visit with prescribing clinician: 3/17/2023   Next office visit with prescribing clinician: 3/17/2023  Last refill sent: 11/17/22, #30  POC Urine Drug Screen [facility] Bio-Cup (09/08/2022 14:45)    Dr Dougherty is out of the office, sent to on call Dr Garg.     Dorys Parra, LPN  12/16/22, 12:59 EST

## 2022-12-16 NOTE — TELEPHONE ENCOUNTER
Caller: Paige Cantu    Relationship: Self    Best call back number:[phone]    Requested Prescriptions:   Requested Prescriptions     Pending Prescriptions Disp Refills   • HYDROcodone-acetaminophen (NORCO) 5-325 MG per tablet 30 tablet 0     Sig: Take 1 tablet by mouth Daily As Needed for Moderate Pain.        Pharmacy where request should be sent: [id number] - [place], [place] - [address] AT [facility] [place] [address] [facility] - [phone] [facility] [phone] FX     Additional details provided by patient: PATIENT IS COMPLETELY OUT OF THIS MEDICATION    Does the patient have less than a 3 day supply:  [x] Yes  [] No    Would you like a call back once the refill request has been completed: [x] Yes [] No    If the office needs to give you a call back, can they leave a voicemail: [x] Yes [] No    Vianey Tello Rep   12/16/22 12:57 EST

## 2022-12-28 DIAGNOSIS — E78.00 ELEVATED CHOLESTEROL: ICD-10-CM

## 2022-12-28 DIAGNOSIS — I10 ESSENTIAL HYPERTENSION: ICD-10-CM

## 2022-12-28 RX ORDER — ATORVASTATIN CALCIUM 20 MG/1
20 TABLET, FILM COATED ORAL DAILY
Qty: 90 TABLET | Refills: 1 | Status: SHIPPED | OUTPATIENT
Start: 2022-12-28

## 2022-12-28 RX ORDER — ROPINIROLE 0.25 MG/1
0.25 TABLET, FILM COATED ORAL NIGHTLY
Qty: 90 TABLET | Refills: 1 | Status: SHIPPED | OUTPATIENT
Start: 2022-12-28

## 2022-12-28 RX ORDER — LISINOPRIL 10 MG/1
10 TABLET ORAL DAILY
Qty: 90 TABLET | Refills: 1 | Status: SHIPPED | OUTPATIENT
Start: 2022-12-28

## 2023-01-16 DIAGNOSIS — M54.42 CHRONIC LEFT-SIDED LOW BACK PAIN WITH LEFT-SIDED SCIATICA: ICD-10-CM

## 2023-01-16 DIAGNOSIS — G89.29 CHRONIC LEFT-SIDED LOW BACK PAIN WITH LEFT-SIDED SCIATICA: ICD-10-CM

## 2023-01-16 RX ORDER — HYDROCODONE BITARTRATE AND ACETAMINOPHEN 5; 325 MG/1; MG/1
1 TABLET ORAL DAILY PRN
Qty: 30 TABLET | Refills: 0 | Status: SHIPPED | OUTPATIENT
Start: 2023-01-16 | End: 2023-02-15 | Stop reason: SDUPTHER

## 2023-01-16 NOTE — TELEPHONE ENCOUNTER
Caller: AlondraPaige    Relationship: Self    Best call back number: [phone]     Requested Prescriptions:   Requested Prescriptions     Pending Prescriptions Disp Refills   • HYDROcodone-acetaminophen (NORCO) 5-325 MG per tablet 30 tablet 0     Sig: Take 1 tablet by mouth Daily As Needed for Moderate Pain.        Pharmacy where request should be sent: [facility] - [facility] [address] [facility] - [phone]  - [phone] FX     Additional details provided by patient: PATIENT IS NEEDING A REFILL.     Does the patient have less than a 3 day supply:  [x] Yes  [] No    Would you like a call back once the refill request has been completed: [x] Yes [] No    If the office needs to give you a call back, can they leave a voicemail: [x] Yes [] No    Vianey Jewell Rep   01/16/23 12:54 EST

## 2023-01-16 NOTE — TELEPHONE ENCOUNTER
Rx Refill Note  Requested Prescriptions     Pending Prescriptions Disp Refills   • HYDROcodone-acetaminophen (NORCO) 5-325 MG per tablet 30 tablet 0     Sig: Take 1 tablet by mouth Daily As Needed for Moderate Pain.      Last office visit with prescribing clinician: 12/8/2022   Last telemedicine visit with prescribing clinician: 3/17/2023   Next office visit with prescribing clinician: 3/17/2023  Last refill sent: 12/16/22, #30  POC Urine Drug Screen [facility] Bio-Cup (09/08/2022 14:45)      Dorys Parra LPN  01/16/23, 15:11 EST

## 2023-01-17 DIAGNOSIS — G89.29 CHRONIC LEFT-SIDED LOW BACK PAIN WITH LEFT-SIDED SCIATICA: ICD-10-CM

## 2023-01-17 DIAGNOSIS — M54.42 CHRONIC LEFT-SIDED LOW BACK PAIN WITH LEFT-SIDED SCIATICA: ICD-10-CM

## 2023-01-17 RX ORDER — HYDROCODONE BITARTRATE AND ACETAMINOPHEN 5; 325 MG/1; MG/1
1 TABLET ORAL DAILY PRN
Qty: 30 TABLET | Refills: 0 | Status: CANCELLED | OUTPATIENT
Start: 2023-01-17

## 2023-01-17 RX ORDER — HYDROCHLOROTHIAZIDE 25 MG/1
25 TABLET ORAL DAILY
Qty: 90 TABLET | Refills: 3 | Status: SHIPPED | OUTPATIENT
Start: 2023-01-17 | End: 2023-01-25 | Stop reason: SDUPTHER

## 2023-01-17 NOTE — TELEPHONE ENCOUNTER
Caller: Paige Cantu    Relationship: Self    Best call back number: [phone]    Requested Prescriptions:   Requested Prescriptions     Pending Prescriptions Disp Refills   • HYDROcodone-acetaminophen (NORCO) 5-325 MG per tablet 30 tablet 0     Sig: Take 1 tablet by mouth Daily As Needed for Moderate Pain.        Pharmacy where request should be sent: [facility] - [facility] [address] [facility] - [phone]  - [phone] FX     Does the patient have less than a 3 day supply:  [x] Yes  [] No    PATIENT WOULD LIKE THIS FILLED IN A 90 SUPPLY IF POSSIBLE.     Stefanie Hoffman, PCT   01/17/23 14:46 EST

## 2023-01-17 NOTE — TELEPHONE ENCOUNTER
Rx Refill Note  Requested Prescriptions     Pending Prescriptions Disp Refills   • hydroCHLOROthiazide (HYDRODIURIL) 25 MG tablet 90 tablet 0     Sig: Take 1 tablet by mouth Daily.      Last office visit with prescribing clinician: 12/8/2022   Last telemedicine visit with prescribing clinician: 3/17/2023   Next office visit with prescribing clinician: 3/17/2023  Last refill sent: 11/15/22, #90  Comprehensive metabolic panel (10/13/2022 15:40)    Changing pharmacies to [facility] mail order.     Dorys Parra LPN  01/17/23, 11:24 EST

## 2023-01-23 RX ORDER — HYDROCHLOROTHIAZIDE 25 MG/1
25 TABLET ORAL DAILY
Qty: 90 TABLET | Refills: 0 | OUTPATIENT
Start: 2023-01-23

## 2023-01-25 RX ORDER — HYDROCHLOROTHIAZIDE 25 MG/1
25 TABLET ORAL DAILY
Qty: 90 TABLET | Refills: 3 | Status: SHIPPED | OUTPATIENT
Start: 2023-01-25 | End: 2023-01-31 | Stop reason: SDUPTHER

## 2023-01-25 NOTE — TELEPHONE ENCOUNTER
Rx Refill Note  Requested Prescriptions     Pending Prescriptions Disp Refills   • hydroCHLOROthiazide (HYDRODIURIL) 25 MG tablet 90 tablet 3     Sig: Take 1 tablet by mouth Daily.      Last office visit with prescribing clinician: 12/8/2022   Last telemedicine visit with prescribing clinician: 3/17/2023   Next office visit with prescribing clinician: 3/17/2023  Last refill sent to [facility] pharmacy 01/17/23, #90, 3 refills.  Should have been sent to [facility] mail order. Resent.     Dorys Parra LPN  01/25/23, 14:48 EST

## 2023-01-31 RX ORDER — HYDROCHLOROTHIAZIDE 25 MG/1
25 TABLET ORAL DAILY
Qty: 90 TABLET | Refills: 0 | Status: SHIPPED | OUTPATIENT
Start: 2023-01-31 | End: 2023-04-07 | Stop reason: SDUPTHER

## 2023-01-31 NOTE — TELEPHONE ENCOUNTER
Rx Refill Note  Requested Prescriptions     Pending Prescriptions Disp Refills   • hydroCHLOROthiazide (HYDRODIURIL) 25 MG tablet 90 tablet 0     Sig: Take 1 tablet by mouth Daily.      Last office visit with prescribing clinician: 12/8/2022   Last telemedicine visit with prescribing clinician: 3/17/2023   Next office visit with prescribing clinician: 3/17/2023                         Would you like a call back once the refill request has been completed: [] Yes [] No    If the office needs to give you a call back, can they leave a voicemail: [] Yes [] No    Selma Suarez LPN  01/31/23, 13:41 EST

## 2023-02-02 DIAGNOSIS — I10 ESSENTIAL HYPERTENSION: ICD-10-CM

## 2023-02-02 RX ORDER — LISINOPRIL 10 MG/1
10 TABLET ORAL DAILY
Qty: 90 TABLET | Refills: 0 | OUTPATIENT
Start: 2023-02-02

## 2023-02-02 NOTE — TELEPHONE ENCOUNTER
Rx Refill Note  Requested Prescriptions     Pending Prescriptions Disp Refills   • lisinopril (PRINIVIL,ZESTRIL) 10 MG tablet [Pharmacy Med Name: LISINOPRIL 10MG TABLETS] 90 tablet 0     Sig: TAKE 1 TABLET BY MOUTH DAILY      Last office visit with prescribing clinician: 12/8/2022     Next office visit with prescribing clinician: 3/17/2023    Selma Suarez LPN  02/02/23, 09:57 EST     Last filled 12/28/22 #90 1 refill   Denied to soon

## 2023-02-06 ENCOUNTER — TELEPHONE (OUTPATIENT)
Dept: FAMILY MEDICINE CLINIC | Age: 83
End: 2023-02-06
Payer: MEDICARE

## 2023-02-06 DIAGNOSIS — G89.29 CHRONIC LEFT-SIDED LOW BACK PAIN WITH LEFT-SIDED SCIATICA: ICD-10-CM

## 2023-02-06 DIAGNOSIS — M54.42 CHRONIC LEFT-SIDED LOW BACK PAIN WITH LEFT-SIDED SCIATICA: ICD-10-CM

## 2023-02-06 NOTE — TELEPHONE ENCOUNTER
Caller: Paige Cantu    Relationship: Self    Best call back number: [phone]    What is the best time to reach you: AFTERNOONS    Who are you requesting to speak with (clinical staff, provider,  specific staff member): CLINICAL    Do you know the name of the person who called: UNKNOWN    What was the call regarding: PATIENT STATED THAT SHE MISSED A CALL ON 2.3.23 AND DOES NOT KNOW WHO IT WAS OR WHAT IT WAS REGARDING.    Do you require a callback: YES

## 2023-02-06 NOTE — TELEPHONE ENCOUNTER
Rx Refill Note  Requested Prescriptions     Pending Prescriptions Disp Refills   • HYDROcodone-acetaminophen (NORCO) 5-325 MG per tablet 30 tablet 0     Sig: Take 1 tablet by mouth Daily As Needed for Moderate Pain.      Last office visit with prescribing clinician: 12/8/2022     Next office visit with prescribing clinician: 4/7/2023    Last filled 1/16/23 #30 no refills   POC Urine Drug Screen [facility] (09/08/2022 14:45)    Last UDS 9/8/22    Last contract 9/8/22      Selma Suarez LPN  02/06/23, 15:43 EST     Patient is going with mail order

## 2023-02-07 RX ORDER — HYDROCODONE BITARTRATE AND ACETAMINOPHEN 5; 325 MG/1; MG/1
1 TABLET ORAL DAILY PRN
Qty: 30 TABLET | Refills: 0 | Status: CANCELLED | OUTPATIENT
Start: 2023-02-07

## 2023-02-07 RX ORDER — HYDROCODONE BITARTRATE AND ACETAMINOPHEN 5; 325 MG/1; MG/1
1 TABLET ORAL DAILY PRN
Qty: 30 TABLET | Refills: 0 | OUTPATIENT
Start: 2023-02-07

## 2023-02-07 NOTE — TELEPHONE ENCOUNTER
Dr Dougherty said he doesn't send by mail order.   Pt informed.  Advised her to call back sooner to refill date and can send to local pharmacy.

## 2023-02-07 NOTE — TELEPHONE ENCOUNTER
Dr Dougherty said it was sent on 01/16/23, #30.  Pt is wanting to use mail order pharmacy.  Pt was informed to send the request in early like 10 days to allow for shipping.

## 2023-02-15 DIAGNOSIS — G89.29 CHRONIC LEFT-SIDED LOW BACK PAIN WITH LEFT-SIDED SCIATICA: ICD-10-CM

## 2023-02-15 DIAGNOSIS — M54.42 CHRONIC LEFT-SIDED LOW BACK PAIN WITH LEFT-SIDED SCIATICA: ICD-10-CM

## 2023-02-15 RX ORDER — HYDROCODONE BITARTRATE AND ACETAMINOPHEN 5; 325 MG/1; MG/1
1 TABLET ORAL DAILY PRN
Qty: 30 TABLET | Refills: 0 | Status: SHIPPED | OUTPATIENT
Start: 2023-02-15 | End: 2023-03-14 | Stop reason: SDUPTHER

## 2023-02-15 NOTE — TELEPHONE ENCOUNTER
Caller: Paige Cantu    Relationship: Self    Best call back number: [phone]    Requested Prescriptions:   Requested Prescriptions     Pending Prescriptions Disp Refills   • HYDROcodone-acetaminophen (NORCO) 5-325 MG per tablet 30 tablet 0     Sig: Take 1 tablet by mouth Daily As Needed for Moderate Pain.      Pharmacy where request should be sent: [facility] [id number] [place], [place] - [address] [facility] [phone]  - [phone] FX     Does the patient have less than a 3 day supply:  [x] Yes  [] No    Would you like a call back once the refill request has been completed: [x] Yes [] No    If the office needs to give you a call back, can they leave a voicemail: [x] Yes [] No    Vianey Sigala Rep   02/15/23 14:03 EST

## 2023-02-15 NOTE — TELEPHONE ENCOUNTER
Rx Refill Note  Requested Prescriptions     Pending Prescriptions Disp Refills   • HYDROcodone-acetaminophen (NORCO) 5-325 MG per tablet 30 tablet 0     Sig: Take 1 tablet by mouth Daily As Needed for Moderate Pain.      Last office visit with prescribing clinician: 12/8/2022   Last telemedicine visit with prescribing clinician: 4/7/2023   Next office visit with prescribing clinician: 4/7/2023     Nathalie Sales LPN  02/15/23, 15:07 EST     LF-1/16/23 #30  UDS-9/8/22

## 2023-03-14 DIAGNOSIS — M54.42 CHRONIC LEFT-SIDED LOW BACK PAIN WITH LEFT-SIDED SCIATICA: ICD-10-CM

## 2023-03-14 DIAGNOSIS — G89.29 CHRONIC LEFT-SIDED LOW BACK PAIN WITH LEFT-SIDED SCIATICA: ICD-10-CM

## 2023-03-14 RX ORDER — HYDROCODONE BITARTRATE AND ACETAMINOPHEN 5; 325 MG/1; MG/1
1 TABLET ORAL DAILY PRN
Qty: 30 TABLET | Refills: 0 | Status: SHIPPED | OUTPATIENT
Start: 2023-03-14

## 2023-03-14 NOTE — TELEPHONE ENCOUNTER
Rx Refill Note  Requested Prescriptions     Pending Prescriptions Disp Refills   • HYDROcodone-acetaminophen (NORCO) 5-325 MG per tablet 30 tablet 0     Sig: Take 1 tablet by mouth Daily As Needed for Moderate Pain.      Last office visit with prescribing clinician: 12/8/2022   Last telemedicine visit with prescribing clinician: 4/7/2023   Next office visit with prescribing clinician: 4/7/2023     Nathalie Sales LPN  03/14/23, 13:53 EDT     LF-2/15/23 #30, RF-0    POC Urine Drug Screen [facility] (09/08/2022 14:45)

## 2023-03-14 NOTE — TELEPHONE ENCOUNTER
Caller: Paige Cantu    Relationship: Self    Best call back number: [phone]    Requested Prescriptions:   Requested Prescriptions     Pending Prescriptions Disp Refills   • HYDROcodone-acetaminophen (NORCO) 5-325 MG per tablet 30 tablet 0     Sig: Take 1 tablet by mouth Daily As Needed for Moderate Pain.        Pharmacy where request should be sent: [facility] [id number] [place], [place] - [address] [facility] [phone]  - [phone] FX     Does the patient have less than a 3 day supply:  [x] Yes  [] No    Would you like a call back once the refill request has been completed: [x] Yes [] No    If the office needs to give you a call back, can they leave a voicemail: [x] Yes [] No    Vianey Phillips Rep   03/14/23 13:38 EDT

## 2023-04-07 ENCOUNTER — OFFICE VISIT (OUTPATIENT)
Dept: FAMILY MEDICINE CLINIC | Age: 83
End: 2023-04-07
Payer: MEDICARE

## 2023-04-07 VITALS
DIASTOLIC BLOOD PRESSURE: 68 MMHG | WEIGHT: 133.6 LBS | OXYGEN SATURATION: 99 % | HEIGHT: 62 IN | HEART RATE: 70 BPM | SYSTOLIC BLOOD PRESSURE: 152 MMHG | BODY MASS INDEX: 24.59 KG/M2

## 2023-04-07 DIAGNOSIS — I10 ESSENTIAL HYPERTENSION: ICD-10-CM

## 2023-04-07 DIAGNOSIS — M54.42 CHRONIC LEFT-SIDED LOW BACK PAIN WITH LEFT-SIDED SCIATICA: ICD-10-CM

## 2023-04-07 DIAGNOSIS — Z00.00 MEDICARE ANNUAL WELLNESS VISIT, SUBSEQUENT: Primary | ICD-10-CM

## 2023-04-07 DIAGNOSIS — E78.00 HIGH CHOLESTEROL: ICD-10-CM

## 2023-04-07 DIAGNOSIS — G89.29 CHRONIC LEFT-SIDED LOW BACK PAIN WITH LEFT-SIDED SCIATICA: ICD-10-CM

## 2023-04-07 RX ORDER — LISINOPRIL 10 MG/1
10 TABLET ORAL DAILY
Qty: 90 TABLET | Refills: 3 | Status: SHIPPED | OUTPATIENT
Start: 2023-04-07

## 2023-04-07 RX ORDER — HYDROCHLOROTHIAZIDE 25 MG/1
25 TABLET ORAL DAILY
Qty: 90 TABLET | Refills: 3 | Status: SHIPPED | OUTPATIENT
Start: 2023-04-07

## 2023-04-07 RX ORDER — ATORVASTATIN CALCIUM 20 MG/1
20 TABLET, FILM COATED ORAL DAILY
Qty: 90 TABLET | Refills: 3 | Status: SHIPPED | OUTPATIENT
Start: 2023-04-07

## 2023-04-07 NOTE — PROGRESS NOTES
The ABCs of the Annual Wellness Visit  Subsequent Medicare Wellness Visit    Subjective    Paige Cantu is a 82 y.o. female who presents for a Subsequent Medicare Wellness Visit.    The following portions of the patient's history were reviewed and   updated as appropriate: allergies, current medications, past family history, past medical history, past social history, past surgical history and problem list.    Compared to one year ago, the patient feels her physical   health is better.    Compared to one year ago, the patient feels her mental   health is the same.    Recent Hospitalizations:  She was not admitted to the hospital during the last year.       Current Medical Providers:  Patient Care Team:  Reza Dougherty MD as PCP - General (Family Medicine)    Outpatient Medications Prior to Visit   Medication Sig Dispense Refill   • anastrozole (ARIMIDEX) 1 MG tablet      • HYDROcodone-acetaminophen (NORCO) 5-325 MG per tablet Take 1 tablet by mouth Daily As Needed for Moderate Pain. 30 tablet 0   • rOPINIRole (REQUIP) 0.25 MG tablet Take 1 tablet by mouth Every Night. Take 1 hour before bedtime for restless legs 90 tablet 1   • atorvastatin (Lipitor) 20 MG tablet Take 1 tablet by mouth Daily. 90 tablet 1   • hydroCHLOROthiazide (HYDRODIURIL) 25 MG tablet Take 1 tablet by mouth Daily. 90 tablet 0   • lisinopril (PRINIVIL,ZESTRIL) 10 MG tablet Take 1 tablet by mouth Daily. 90 tablet 1     No facility-administered medications prior to visit.       Opioid medication/s are on active medication list.  and I have evaluated her active treatment plan and pain score trends (see table).  Vitals:    04/07/23 1334   PainSc: 0-No pain     I have reviewed the chart for potential of high risk medication and harmful drug interactions in the elderly.            Aspirin is not on active medication list.  Aspirin use is not indicated based on review of current medical condition/s. Risk of harm outweighs potential benefits.   ".    Patient Active Problem List   Diagnosis   • Chronic left-sided low back pain with left-sided sciatica   • High cholesterol   • Essential hypertension   • History of breast cancer   • Medicare annual wellness visit, subsequent     Advance Care Planning   Advance Care Planning     Advance Directive is not on file.  ACP discussion was held with the patient during this visit. Patient has an advance directive (not in EMR), copy requested.     Objective    Vitals:    04/07/23 1334   BP: 152/68   BP Location: Left arm   Patient Position: Sitting   Pulse: 70   SpO2: 99%  Comment: on room air   Weight: 60.6 kg (133 lb 9.6 oz)   Height: 158.1 cm (62.25\")   PainSc: 0-No pain     Estimated body mass index is 24.24 kg/m² as calculated from the following:    Height as of this encounter: 158.1 cm (62.25\").    Weight as of this encounter: 60.6 kg (133 lb 9.6 oz).    BMI is within normal parameters. No other follow-up for BMI required.      Does the patient have evidence of cognitive impairment? No          HEALTH RISK ASSESSMENT    Smoking Status:  Social History     Tobacco Use   Smoking Status Never   Smokeless Tobacco Never     Alcohol Consumption:  Social History     Substance and Sexual Activity   Alcohol Use None     Fall Risk Screen:    STEADI Fall Risk Assessment was completed, and patient is at LOW risk for falls.Assessment completed on:4/7/2023    Depression Screening:  PHQ-2/PHQ-9 Depression Screening 4/7/2023   Little Interest or Pleasure in Doing Things 0-->not at all   Feeling Down, Depressed or Hopeless 0-->not at all   PHQ-9: Brief Depression Severity Measure Score 0       Health Habits and Functional and Cognitive Screening:  Functional & Cognitive Status 4/7/2023   Do you have difficulty preparing food and eating? No   Do you have difficulty bathing yourself, getting dressed or grooming yourself? No   Do you have difficulty using the toilet? No   Do you have difficulty moving around from place to place? No "   Do you have trouble with steps or getting out of a bed or a chair? No   Current Diet Well Balanced Diet   Dental Exam Up to date   Eye Exam Up to date   Exercise (times per week) 4 times per week   Current Exercises Include House Cleaning;Walking;Home Exercise Program (TV, Computer, Etc.)   Do you need help using the phone?  No   Are you deaf or do you have serious difficulty hearing?  No   Do you need help with transportation? No   Do you need help shopping? No   Do you need help preparing meals?  No   Do you need help with housework?  No   Do you need help with laundry? No   Do you need help taking your medications? No   Do you need help managing money? No   Do you ever drive or ride in a car without wearing a seat belt? No   Have you felt unusual stress, anger or loneliness in the last month? No   Who do you live with? Alone   If you need help, do you have trouble finding someone available to you? No   Do you have difficulty concentrating, remembering or making decisions? No       Age-appropriate Screening Schedule:  Refer to the list below for future screening recommendations based on patient's age, sex and/or medical conditions. Orders for these recommended tests are listed in the plan section. The patient has been provided with a written plan.    Health Maintenance   Topic Date Due   • ZOSTER VACCINE (1 of 2) Never done   • TDAP/TD VACCINES (2 - Tdap) 10/07/2020   • COVID-19 Vaccine (3 - Booster for Moderna series) 04/20/2021   • INFLUENZA VACCINE  08/01/2023   • LIPID PANEL  10/13/2023   • ANNUAL PHYSICAL  04/07/2024   • DXA SCAN  12/06/2024   • Pneumococcal Vaccine 65+  Completed                  CMS Preventative Services Quick Reference  Risk Factors Identified During Encounter  None Identified  The above risks/problems have been discussed with the patient.  Pertinent information has been shared with the patient in the After Visit Summary.  An After Visit Summary and PPPS were made available to the  "patient.    Follow Up:   Next Medicare Wellness visit to be scheduled in 1 year.       Additional E&M Note during same encounter follows:  Patient has multiple medical problems which are significant and separately identifiable that require additional work above and beyond the Medicare Wellness Visit.      Chief Complaint  Medicare Wellness-subsequent    Subjective        --TOLERATING BLOOD PRESSURE MEDICATION WITHOUT APPARENT SIDE EFFECTS, BP IS OK AT OME  --LAST LIPIDS WERE OK, NO ISSUES WITH MEDS  --CONTINUES ON ROUTINE NARCOTICS FOR CHRONIC LOW BACK PAIN, STABLE    Paige E Seem is also being seen today for BLOOD PRESSURE, CHOLESTEROL, LOW BACK PAIN     Review of Systems   Constitutional: Negative for activity change, appetite change, chills, fatigue and fever.   HENT: Negative for congestion, ear pain, hearing loss, rhinorrhea and sore throat.    Eyes: Negative for discharge and visual disturbance.   Respiratory: Negative for cough and shortness of breath.    Cardiovascular: Negative for chest pain, palpitations and leg swelling.   Gastrointestinal: Negative for abdominal pain, diarrhea, nausea and vomiting.   Genitourinary: Negative for dysuria and hematuria.   Musculoskeletal: Negative for arthralgias and myalgias.   Psychiatric/Behavioral: Negative for dysphoric mood.       Objective   Vital Signs:  /68 (BP Location: Left arm, Patient Position: Sitting)   Pulse 70   Ht 158.1 cm (62.25\")   Wt 60.6 kg (133 lb 9.6 oz)   SpO2 99% Comment: on room air  BMI 24.24 kg/m²     Physical Exam  Vitals and nursing note reviewed.   Constitutional:       General: She is not in acute distress.     Appearance: Normal appearance.   HENT:      Right Ear: Tympanic membrane normal.      Left Ear: Tympanic membrane normal.      Mouth/Throat:      Pharynx: Oropharynx is clear.   Eyes:      Conjunctiva/sclera: Conjunctivae normal.   Cardiovascular:      Rate and Rhythm: Normal rate and regular rhythm.      Heart sounds: " Normal heart sounds. No murmur heard.  Pulmonary:      Effort: Pulmonary effort is normal.      Breath sounds: Normal breath sounds.   Abdominal:      General: Bowel sounds are normal.      Palpations: Abdomen is soft.      Tenderness: There is no abdominal tenderness.   Musculoskeletal:      Cervical back: Neck supple.      Right lower leg: No edema.      Left lower leg: No edema.   Lymphadenopathy:      Cervical: No cervical adenopathy.   Neurological:      General: No focal deficit present.      Mental Status: She is alert.      Cranial Nerves: No cranial nerve deficit.      Coordination: Coordination normal.      Gait: Gait normal.   Psychiatric:         Mood and Affect: Mood normal.         Behavior: Behavior normal.          The following data was reviewed by: Reza Dougherty MD on 04/07/2023:  Common labs    Common Labs 7/19/22 10/13/22 10/13/22     1540 1540   Glucose  93    BUN  15    Creatinine  1.00    Sodium  140    Potassium  4.8    Chloride  107    Calcium  8.8    Albumin  4.00    Total Bilirubin  0.4    Alkaline Phosphatase  82    AST (SGOT)  18    ALT (SGPT)  15    Total Cholesterol 262 (A)  143   Triglycerides 512 (A)  116   HDL Cholesterol 41  58   LDL Cholesterol  128 (A)  64   (A) Abnormal value                       Assessment and Plan   Diagnoses and all orders for this visit:    1. Medicare annual wellness visit, subsequent (Primary)  Assessment & Plan:  ADVICE GIVEN RE:  SEATBELT USE, ALCOHOL USE, HEALTHY DIET, ROUTINE EYE AND DENTAL EXAM, ROUTINE VACCINATIONS.        2. Chronic left-sided low back pain with left-sided sciatica  Assessment & Plan:  STABLE ON CURRENT MEDICATION.  AZRA REVIEWED.  TOX SCREEN IS UP TO DATE.  THE CONTINUED USE OF A CONTROLLED SUBSTANCE IS NECESSARY FOR THIS PATIENT TO HAVE A NEAR NORMAL QUALITY OF LIFE AND WILL BE REVIEWED AT THE NEXT ROUTINE VISIT.      3. Essential hypertension  Assessment & Plan:  Hypertension is improving with treatment.  Continue  current treatment regimen.  Regular aerobic exercise.  Continue current medications.  Blood pressure will be reassessed at the next regular appointment.  NOT AT GOAL TODAY BUT SHE WILL CONTINUE TO MONITOR AT HOME     Orders:  -     hydroCHLOROthiazide (HYDRODIURIL) 25 MG tablet; Take 1 tablet by mouth Daily.  Dispense: 90 tablet; Refill: 3  -     lisinopril (PRINIVIL,ZESTRIL) 10 MG tablet; Take 1 tablet by mouth Daily.  Dispense: 90 tablet; Refill: 3    4. High cholesterol  Assessment & Plan:  Lipid abnormalities are improving with treatment.  Nutritional counseling was provided.  Lipids will be reassessed in 6 months.    Orders:  -     atorvastatin (Lipitor) 20 MG tablet; Take 1 tablet by mouth Daily.  Dispense: 90 tablet; Refill: 3           Follow Up   Return in about 3 months (around 7/7/2023).  Patient was given instructions and counseling regarding her condition or for health maintenance advice. Please see specific information pulled into the AVS if appropriate.

## 2023-04-07 NOTE — ASSESSMENT & PLAN NOTE
Hypertension is improving with treatment.  Continue current treatment regimen.  Regular aerobic exercise.  Continue current medications.  Blood pressure will be reassessed at the next regular appointment.  NOT AT GOAL TODAY BUT SHE WILL CONTINUE TO MONITOR AT HOME

## 2023-04-19 DIAGNOSIS — G89.29 CHRONIC LEFT-SIDED LOW BACK PAIN WITH LEFT-SIDED SCIATICA: ICD-10-CM

## 2023-04-19 DIAGNOSIS — M54.42 CHRONIC LEFT-SIDED LOW BACK PAIN WITH LEFT-SIDED SCIATICA: ICD-10-CM

## 2023-04-19 RX ORDER — HYDROCODONE BITARTRATE AND ACETAMINOPHEN 5; 325 MG/1; MG/1
1 TABLET ORAL DAILY PRN
Qty: 30 TABLET | Refills: 0 | Status: SHIPPED | OUTPATIENT
Start: 2023-04-19

## 2023-04-19 NOTE — TELEPHONE ENCOUNTER
Caller: Paige Cantu    Relationship: Self    Best call back number: [phone]    Requested Prescriptions:   Requested Prescriptions     Pending Prescriptions Disp Refills   • HYDROcodone-acetaminophen (NORCO) 5-325 MG per tablet 30 tablet 0     Sig: Take 1 tablet by mouth Daily As Needed for Moderate Pain.        Pharmacy where request should be sent: [facility] [id number] - [place], [place] - [address] [facility] - [phone]  - [phone] FX     Last office visit with prescribing clinician: 4/7/2023   Last telemedicine visit with prescribing clinician: 7/13/2023   Next office visit with prescribing clinician: 7/13/2023     Additional details provided by patient:    Does the patient have less than a 3 day supply:  [x] Yes  [] No    Would you like a call back once the refill request has been completed: [] Yes [x] No    If the office needs to give you a call back, can they leave a voicemail: [] Yes [x] No    Vianey Locke Rep   04/19/23 11:55 EDT

## 2023-04-19 NOTE — TELEPHONE ENCOUNTER
Rx Refill Note  Requested Prescriptions     Pending Prescriptions Disp Refills   • HYDROcodone-acetaminophen (NORCO) 5-325 MG per tablet 30 tablet 0     Sig: Take 1 tablet by mouth Daily As Needed for Moderate Pain.      Last office visit with prescribing clinician: 4/7/2023   Last telemedicine visit with prescribing clinician: 7/13/2023   Next office visit with prescribing clinician: 7/13/2023  Last refill sent: 03/14/23, #30  POC Urine Drug Screen [facility] (09/08/2022 14:45)      Dorys Parra LPN  04/19/23, 12:38 EDT

## 2023-05-19 DIAGNOSIS — G89.29 CHRONIC LEFT-SIDED LOW BACK PAIN WITH LEFT-SIDED SCIATICA: ICD-10-CM

## 2023-05-19 DIAGNOSIS — M54.42 CHRONIC LEFT-SIDED LOW BACK PAIN WITH LEFT-SIDED SCIATICA: ICD-10-CM

## 2023-05-19 NOTE — TELEPHONE ENCOUNTER
Rx Refill Note  Requested Prescriptions     Pending Prescriptions Disp Refills   • HYDROcodone-acetaminophen (NORCO) 5-325 MG per tablet 30 tablet 0     Sig: Take 1 tablet by mouth Daily As Needed for Moderate Pain.      Last office visit with prescribing clinician: 4/7/2023   Last telemedicine visit with prescribing clinician: 4/19/2023   Next office visit with prescribing clinician: 7/13/2023  Last refill sent: 04/19/23, #30  POC Urine Drug Screen [facility] (09/08/2022 14:45)    Dorys Parra LPN  05/19/23, 12:53 EDT

## 2023-05-19 NOTE — TELEPHONE ENCOUNTER
Caller: Paige Cantu    Relationship: Self    Best call back number: [phone]    Requested Prescriptions:   Requested Prescriptions     Pending Prescriptions Disp Refills   • HYDROcodone-acetaminophen (NORCO) 5-325 MG per tablet 30 tablet 0     Sig: Take 1 tablet by mouth Daily As Needed for Moderate Pain.        Pharmacy where request should be sent: [facility] [id number] [place], [place] - [address] [facility] [phone]  - [phone] FX     Last office visit with prescribing clinician: 4/7/2023   Last telemedicine visit with prescribing clinician: 4/19/2023   Next office visit with prescribing clinician: 7/13/2023       Does the patient have less than a 3 day supply:  [x] Yes  [] No    Would you like a call back once the refill request has been completed: [x] Yes [] No    If the office needs to give you a call back, can they leave a voicemail: [] Yes [x] No    Vianey Chan Rep   05/19/23 12:42 EDT

## 2023-05-21 RX ORDER — HYDROCODONE BITARTRATE AND ACETAMINOPHEN 5; 325 MG/1; MG/1
1 TABLET ORAL DAILY PRN
Qty: 30 TABLET | Refills: 0 | Status: SHIPPED | OUTPATIENT
Start: 2023-05-21

## 2023-05-24 DIAGNOSIS — E78.00 HIGH CHOLESTEROL: ICD-10-CM

## 2023-05-24 DIAGNOSIS — I10 ESSENTIAL HYPERTENSION: ICD-10-CM

## 2023-05-24 RX ORDER — LISINOPRIL 10 MG/1
TABLET ORAL
Qty: 90 TABLET | Refills: 3 | Status: SHIPPED | OUTPATIENT
Start: 2023-05-24

## 2023-05-24 RX ORDER — ROPINIROLE 0.25 MG/1
TABLET, FILM COATED ORAL
Qty: 90 TABLET | Refills: 1 | Status: SHIPPED | OUTPATIENT
Start: 2023-05-24

## 2023-05-24 RX ORDER — ATORVASTATIN CALCIUM 20 MG/1
TABLET, FILM COATED ORAL
Qty: 90 TABLET | Refills: 3 | Status: SHIPPED | OUTPATIENT
Start: 2023-05-24

## 2023-06-16 ENCOUNTER — TELEPHONE (OUTPATIENT)
Dept: FAMILY MEDICINE CLINIC | Age: 83
End: 2023-06-16
Payer: MEDICARE

## 2023-06-16 DIAGNOSIS — M54.42 CHRONIC LEFT-SIDED LOW BACK PAIN WITH LEFT-SIDED SCIATICA: ICD-10-CM

## 2023-06-16 DIAGNOSIS — G89.29 CHRONIC LEFT-SIDED LOW BACK PAIN WITH LEFT-SIDED SCIATICA: ICD-10-CM

## 2023-06-16 NOTE — TELEPHONE ENCOUNTER
Rx Refill Note  Requested Prescriptions     Pending Prescriptions Disp Refills    HYDROcodone-acetaminophen (NORCO) 5-325 MG per tablet 30 tablet 0     Sig: Take 1 tablet by mouth Daily As Needed for Moderate Pain.      Last office visit with prescribing clinician: 4/7/2023   Last telemedicine visit with prescribing clinician: Visit date not found   Next office visit with prescribing clinician: 7/13/2023     Nathalie Sales LPN  06/16/23, 15:51 EDT          LF-5/21/23 #30, RF-0  UDS-POC Urine Drug Screen [facility] (09/08/2022 14:45)

## 2023-06-16 NOTE — TELEPHONE ENCOUNTER
Caller: Paige Cantu    Relationship: Self    Best call back number:    [phone]       Requested Prescriptions:   Requested Prescriptions     Pending Prescriptions Disp Refills    HYDROcodone-acetaminophen (NORCO) 5-325 MG per tablet 30 tablet 0     Sig: Take 1 tablet by mouth Daily As Needed for Moderate Pain.        Pharmacy where request should be sent: [facility] [id number] - [place], [place] - [address] [facility] - [phone]  - [phone] FX     Last office visit with prescribing clinician: 4/7/2023   Last telemedicine visit with prescribing clinician: Visit date not found   Next office visit with prescribing clinician: 7/13/2023       Does the patient have less than a 3 day supply:  [x] Yes  [] No    Would you like a call back once the refill request has been completed: [] Yes [x] No    If the office needs to give you a call back, can they leave a voicemail: [] Yes [x] No    Vianey Barth   06/16/23 15:48 EDT

## 2023-06-18 RX ORDER — HYDROCODONE BITARTRATE AND ACETAMINOPHEN 5; 325 MG/1; MG/1
1 TABLET ORAL DAILY PRN
Qty: 30 TABLET | Refills: 0 | Status: SHIPPED | OUTPATIENT
Start: 2023-06-18

## 2023-07-13 PROBLEM — Z00.00 MEDICARE ANNUAL WELLNESS VISIT, SUBSEQUENT: Status: RESOLVED | Noted: 2023-04-07 | Resolved: 2023-07-13

## 2023-08-16 ENCOUNTER — TELEPHONE (OUTPATIENT)
Dept: FAMILY MEDICINE CLINIC | Age: 83
End: 2023-08-16
Payer: MEDICARE

## 2023-08-16 DIAGNOSIS — G89.29 CHRONIC LEFT-SIDED LOW BACK PAIN WITH LEFT-SIDED SCIATICA: ICD-10-CM

## 2023-08-16 DIAGNOSIS — M54.42 CHRONIC LEFT-SIDED LOW BACK PAIN WITH LEFT-SIDED SCIATICA: ICD-10-CM

## 2023-08-16 NOTE — TELEPHONE ENCOUNTER
Rx Refill Note  Requested Prescriptions     Pending Prescriptions Disp Refills    HYDROcodone-acetaminophen (NORCO) 5-325 MG per tablet 30 tablet 0     Sig: Take 1 tablet by mouth Daily As Needed for Moderate Pain.      Last office visit with prescribing clinician: 7/13/2023   Last telemedicine visit with prescribing clinician: Visit date not found   Next office visit with prescribing clinician: 10/23/2023       Nathalie Sales LPN  08/16/23, 14:40 EDT    LF-6/18/23 #30  UDS-POC Urine Drug Screen [facility] (09/08/2022 14:45)

## 2023-08-16 NOTE — TELEPHONE ENCOUNTER
Caller: Paige Cantu    Relationship: Self    Best call back number:     [phone]        Requested Prescriptions:   Requested Prescriptions     Pending Prescriptions Disp Refills    HYDROcodone-acetaminophen (NORCO) 5-325 MG per tablet 30 tablet 0     Sig: Take 1 tablet by mouth Daily As Needed for Moderate Pain.        Pharmacy where request should be sent: [facility] [id number] - [place], [place] - [address] [facility] - [phone]  - [phone] FX     Last office visit with prescribing clinician: 7/13/2023   Last telemedicine visit with prescribing clinician: Visit date not found   Next office visit with prescribing clinician: 10/23/2023     Does the patient have less than a 3 day supply:  [x] Yes  [] No    Would you like a call back once the refill request has been completed: [] Yes [x] No    If the office needs to give you a call back, can they leave a voicemail: [] Yes [x] No    Vianey Barth   08/16/23 14:04 EDT

## 2023-08-18 RX ORDER — HYDROCODONE BITARTRATE AND ACETAMINOPHEN 5; 325 MG/1; MG/1
1 TABLET ORAL DAILY PRN
Qty: 30 TABLET | Refills: 0 | Status: SHIPPED | OUTPATIENT
Start: 2023-08-18

## 2023-09-13 DIAGNOSIS — M54.42 CHRONIC LEFT-SIDED LOW BACK PAIN WITH LEFT-SIDED SCIATICA: ICD-10-CM

## 2023-09-13 DIAGNOSIS — G89.29 CHRONIC LEFT-SIDED LOW BACK PAIN WITH LEFT-SIDED SCIATICA: ICD-10-CM

## 2023-09-13 NOTE — TELEPHONE ENCOUNTER
Caller: Paige Cantu    Relationship: Self    Best call back number:     [phone] (Home)       Requested Prescriptions:   Requested Prescriptions     Pending Prescriptions Disp Refills    HYDROcodone-acetaminophen (NORCO) 5-325 MG per tablet 30 tablet 0     Sig: Take 1 tablet by mouth Daily As Needed for Moderate Pain.        Pharmacy where request should be sent:  [facility] [id number] [facility] [place] - [address] [facility] - [phone]  - [phone]  [phone]     Last office visit with prescribing clinician: 7/13/2023   Last telemedicine visit with prescribing clinician: Visit date not found   Next office visit with prescribing clinician: 10/23/2023     Additional details provided by patient: PATIENT SAYS SHE THINKS SHE MIGHT NEED TO GO UP A LITTLE ON DOSE    Does the patient have less than a 3 day supply:  [] Yes  [x] No    Would you like a call back once the refill request has been completed: [] Yes [x] No    If the office needs to give you a call back, can they leave a voicemail: [] Yes [x] No    Vianey Valentino Rep   09/13/23 11:31 EDT

## 2023-09-14 NOTE — TELEPHONE ENCOUNTER
Reza Dougherty MD       WH  IT'S TOO EARLY FOR A REFILL AND IF SHE THINKS SHE NEEDS MORE PAIN MEDS WE NEED TO SEND HER TO PAIN MGMT.

## 2023-09-14 NOTE — TELEPHONE ENCOUNTER
Pt said she counted her pills in the bottle and she has # 3 pills left, she got it on 08/18/23.  She said she didn't realize it was a little early and she said we must have misunderstood her she doesn't need a increased dose.  Not wanting to go to pain management.

## 2023-09-15 RX ORDER — HYDROCODONE BITARTRATE AND ACETAMINOPHEN 5; 325 MG/1; MG/1
1 TABLET ORAL DAILY PRN
Qty: 30 TABLET | Refills: 0 | Status: SHIPPED | OUTPATIENT
Start: 2023-09-15

## 2023-09-18 DIAGNOSIS — M54.42 CHRONIC LEFT-SIDED LOW BACK PAIN WITH LEFT-SIDED SCIATICA: ICD-10-CM

## 2023-09-18 DIAGNOSIS — G89.29 CHRONIC LEFT-SIDED LOW BACK PAIN WITH LEFT-SIDED SCIATICA: ICD-10-CM

## 2023-09-18 RX ORDER — HYDROCODONE BITARTRATE AND ACETAMINOPHEN 5; 325 MG/1; MG/1
1 TABLET ORAL DAILY PRN
Qty: 30 TABLET | Refills: 0 | OUTPATIENT
Start: 2023-09-18

## 2023-09-18 NOTE — TELEPHONE ENCOUNTER
Caller: Paige Cantu    Relationship: Self    Best call back number: [phone]     Requested Prescriptions:   Requested Prescriptions     Pending Prescriptions Disp Refills    HYDROcodone-acetaminophen (NORCO) 5-325 MG per tablet 30 tablet 0     Sig: Take 1 tablet by mouth Daily As Needed for Moderate Pain.        Pharmacy where request should be sent: [facility]     Last office visit with prescribing clinician: 7/13/2023   Last telemedicine visit with prescribing clinician: Visit date not found   Next office visit with prescribing clinician: 10/23/2023     Does the patient have less than a 3 day supply:  [x] Yes  [] No    Would you like a call back once the refill request has been completed: [x] Yes [] No    If the office needs to give you a call back, can they leave a voicemail: [x] Yes [] No    Vianey Ayon Rep   09/18/23 15:08 EDT

## 2023-09-19 NOTE — TELEPHONE ENCOUNTER
[facility] pharmacy said hydrocodone is on back order and is unavailable at this time.   Pt ask for it to be sent to [facility] pharmacy.

## 2023-09-20 RX ORDER — HYDROCODONE BITARTRATE AND ACETAMINOPHEN 5; 325 MG/1; MG/1
1 TABLET ORAL DAILY PRN
Qty: 30 TABLET | Refills: 0 | Status: SHIPPED | OUTPATIENT
Start: 2023-09-20

## 2023-09-21 ENCOUNTER — TELEPHONE (OUTPATIENT)
Dept: FAMILY MEDICINE CLINIC | Age: 83
End: 2023-09-21
Payer: MEDICARE

## 2023-10-18 DIAGNOSIS — M54.42 CHRONIC LEFT-SIDED LOW BACK PAIN WITH LEFT-SIDED SCIATICA: ICD-10-CM

## 2023-10-18 DIAGNOSIS — G89.29 CHRONIC LEFT-SIDED LOW BACK PAIN WITH LEFT-SIDED SCIATICA: ICD-10-CM

## 2023-10-18 RX ORDER — HYDROCODONE BITARTRATE AND ACETAMINOPHEN 5; 325 MG/1; MG/1
1 TABLET ORAL DAILY PRN
Qty: 30 TABLET | Refills: 0 | Status: SHIPPED | OUTPATIENT
Start: 2023-10-18

## 2023-10-18 NOTE — TELEPHONE ENCOUNTER
Caller: AlondraPaige    Relationship: Self    Best call back number: [phone]     Requested Prescriptions:   Requested Prescriptions     Pending Prescriptions Disp Refills    HYDROcodone-acetaminophen (NORCO) 5-325 MG per tablet 30 tablet 0     Sig: Take 1 tablet by mouth Daily As Needed for Moderate Pain.        Pharmacy where request should be sent: [facility] [id number] - [place], [place] - [address] [facility] - [phone]  - [phone] FX     Last office visit with prescribing clinician: 7/13/2023   Last telemedicine visit with prescribing clinician: Visit date not found   Next office visit with prescribing clinician: 10/23/2023     Additional details provided by patient:     Does the patient have less than a 3 day supply:  [x] Yes  [] No    Would you like a call back once the refill request has been completed: [] Yes [] No    If the office needs to give you a call back, can they leave a voicemail: [] Yes [] No    Vianey Lopez Rep   10/18/23 13:14 EDT

## 2023-10-23 ENCOUNTER — CLINICAL SUPPORT (OUTPATIENT)
Dept: FAMILY MEDICINE CLINIC | Age: 83
End: 2023-10-23
Payer: MEDICARE

## 2023-10-23 ENCOUNTER — OFFICE VISIT (OUTPATIENT)
Dept: FAMILY MEDICINE CLINIC | Age: 83
End: 2023-10-23
Payer: OTHER MISCELLANEOUS

## 2023-10-23 VITALS
HEART RATE: 77 BPM | HEIGHT: 62 IN | DIASTOLIC BLOOD PRESSURE: 65 MMHG | WEIGHT: 142.6 LBS | SYSTOLIC BLOOD PRESSURE: 150 MMHG | BODY MASS INDEX: 26.24 KG/M2 | TEMPERATURE: 98.3 F

## 2023-10-23 DIAGNOSIS — M54.42 CHRONIC LEFT-SIDED LOW BACK PAIN WITH LEFT-SIDED SCIATICA: Primary | ICD-10-CM

## 2023-10-23 DIAGNOSIS — G89.29 CHRONIC LEFT-SIDED LOW BACK PAIN WITH LEFT-SIDED SCIATICA: Primary | ICD-10-CM

## 2023-10-23 DIAGNOSIS — Z23 NEED FOR INFLUENZA VACCINATION: Primary | ICD-10-CM

## 2023-10-23 PROCEDURE — 90662 IIV NO PRSV INCREASED AG IM: CPT | Performed by: FAMILY MEDICINE

## 2023-10-23 PROCEDURE — G0008 ADMIN INFLUENZA VIRUS VAC: HCPCS | Performed by: FAMILY MEDICINE

## 2023-10-23 PROCEDURE — 99213 OFFICE O/P EST LOW 20 MIN: CPT | Performed by: FAMILY MEDICINE

## 2023-10-23 NOTE — PROGRESS NOTES
Chief Complaint  Hypertension (3 month follow up)    Subjective          Paige Cantu presents to [facility]  History of Present Illness  --HERE TO DAY TO F/U ON HER CHRONIC LOW BACK PAIN WITH LEFT SCIATICA.  THIS BEGAN SEVERAL YEARS AGO WITH AN INJURY AT WORK.  SHE HAS HAD MRI'S AND P.T. AND DOES PRETTY WELL BUT THE PAIN PERSISTS.  SHE TAKES ONE NORCO DAILY ALONG WITH REQUIP.  STABLE CURRENTLY     ADD LUNG NODULES     ADK YFN TO CALL        No Known Allergies     Health Maintenance Due   Topic Date Due    ZOSTER VACCINE (1 of 2) Never done    TDAP/TD VACCINES (2 - Tdap) 10/07/2020    LIPID PANEL  10/13/2023        Current Outpatient Medications on File Prior to Visit   Medication Sig    anastrozole (ARIMIDEX) 1 MG tablet     atorvastatin (LIPITOR) 20 MG tablet TAKE 1 TABLET EVERY DAY    hydroCHLOROthiazide (HYDRODIURIL) 25 MG tablet Take 1 tablet by mouth Daily.    HYDROcodone-acetaminophen (NORCO) 5-325 MG per tablet Take 1 tablet by mouth Daily As Needed for Moderate Pain.    lisinopril (PRINIVIL,ZESTRIL) 10 MG tablet TAKE 1 TABLET EVERY DAY    rOPINIRole (REQUIP) 0.25 MG tablet TAKE 1 TABLET BY MOUTH EVERY NIGHT 1 HOUR BEFORE BEDTIME FOR RESTLESS LEGS    [DISCONTINUED] phenazopyridine (Pyridium) 200 MG tablet Take 1 tablet by mouth 3 (Three) Times a Day As Needed for Bladder Spasms. (Patient not taking: Reported on 10/23/2023)    [DISCONTINUED] sulfamethoxazole-trimethoprim (Bactrim DS) 800-160 MG per tablet Take 1 tablet by mouth 2 (Two) Times a Day. (Patient not taking: Reported on 10/23/2023)     No current facility-administered medications on file prior to visit.       Immunization History   Administered Date(s) Administered    COVID-19 (MODERNA) 1st,2nd,3rd Dose Monovalent 01/26/2021, 02/23/2021    Fluzone High Dose =>65 Years (Vaxcare ONLY) 01/29/2018, 12/28/2018    Fluzone High-Dose 65+yrs 01/05/2022, 12/08/2022, 10/23/2023    Influenza TIV (IM) 10/26/2012    Influenza,  "Unspecified 09/30/2020    Pneumococcal Conjugate 13-Valent (PCV13) 08/11/2015    Pneumococcal Polysaccharide (PPSV23) 01/01/2006    Td (TDVAX) 10/07/2010       Review of Systems   Constitutional:  Negative for activity change, appetite change, chills, fatigue and fever.   HENT:  Negative for congestion, ear pain, rhinorrhea and sore throat.    Respiratory:  Negative for cough and shortness of breath.    Cardiovascular:  Negative for chest pain, palpitations and leg swelling.   Gastrointestinal:  Negative for abdominal pain, constipation, diarrhea, nausea and vomiting.   Musculoskeletal:  Negative for arthralgias and myalgias.   Neurological:  Negative for headache.        Objective     /65 (BP Location: Left arm, Patient Position: Sitting, Cuff Size: Large Adult)   Pulse 77   Temp 98.3 °F (36.8 °C) (Oral)   Ht 158.1 cm (62.25\")   Wt 64.7 kg (142 lb 9.6 oz)   BMI 25.87 kg/m²       Physical Exam  Vitals and nursing note reviewed.   Constitutional:       General: She is not in acute distress.     Appearance: Normal appearance.   Cardiovascular:      Rate and Rhythm: Normal rate and regular rhythm.      Heart sounds: Normal heart sounds. No murmur heard.  Pulmonary:      Effort: Pulmonary effort is normal.      Breath sounds: Normal breath sounds.   Abdominal:      Palpations: Abdomen is soft.      Tenderness: There is no abdominal tenderness.   Musculoskeletal:      Cervical back: Neck supple.      Right lower leg: No edema.      Left lower leg: No edema.      Comments: GOOD ROM BUT POSITIVE LEFT SLR    Lymphadenopathy:      Cervical: No cervical adenopathy.   Neurological:      General: No focal deficit present.      Mental Status: She is alert.      Cranial Nerves: No cranial nerve deficit.      Coordination: Coordination normal.      Gait: Gait normal.   Psychiatric:         Mood and Affect: Mood normal.         Behavior: Behavior normal.         Result Review :                             Assessment and " Plan      Diagnoses and all orders for this visit:    1. 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  AZRA REVIEWED.  TOX SCREEN IS UP TO DATE.  THE CONTINUED USE OF A CONTROLLED SUBSTANCE IS NECESSARY FOR THIS PATIENT TO HAVE A NEAR NORMAL QUALITY OF LIFE AND WILL BE REVIEWED AT THE NEXT ROUTINE VISIT.              Follow Up     Return in about 3 months (around 1/23/2024).    Patient was given instructions and counseling regarding her condition or for health maintenance advice. Please see specific information pulled into the AVS if appropriate.

## 2023-10-27 DIAGNOSIS — E78.00 HIGH CHOLESTEROL: ICD-10-CM

## 2023-10-27 DIAGNOSIS — I10 ESSENTIAL HYPERTENSION: ICD-10-CM

## 2023-10-27 RX ORDER — ANASTROZOLE 1 MG/1
TABLET ORAL
OUTPATIENT
Start: 2023-10-27

## 2023-10-27 RX ORDER — ROPINIROLE 0.25 MG/1
0.25 TABLET, FILM COATED ORAL NIGHTLY
Qty: 90 TABLET | Refills: 1 | Status: SHIPPED | OUTPATIENT
Start: 2023-10-27

## 2023-10-27 RX ORDER — LISINOPRIL 10 MG/1
10 TABLET ORAL DAILY
Qty: 90 TABLET | Refills: 1 | Status: SHIPPED | OUTPATIENT
Start: 2023-10-27

## 2023-10-27 RX ORDER — HYDROCHLOROTHIAZIDE 25 MG/1
25 TABLET ORAL DAILY
Qty: 90 TABLET | Refills: 3 | Status: SHIPPED | OUTPATIENT
Start: 2023-10-27

## 2023-10-27 RX ORDER — ATORVASTATIN CALCIUM 20 MG/1
20 TABLET, FILM COATED ORAL DAILY
Qty: 90 TABLET | Refills: 1 | Status: SHIPPED | OUTPATIENT
Start: 2023-10-27

## 2023-10-27 NOTE — TELEPHONE ENCOUNTER
Rx Refill Note  Requested Prescriptions     Pending Prescriptions Disp Refills    hydroCHLOROthiazide (HYDRODIURIL) 25 MG tablet 90 tablet 3     Sig: Take 1 tablet by mouth Daily.    lisinopril (PRINIVIL,ZESTRIL) 10 MG tablet 90 tablet 1     Sig: Take 1 tablet by mouth Daily.    atorvastatin (LIPITOR) 20 MG tablet 90 tablet 1     Sig: Take 1 tablet by mouth Daily.    anastrozole (ARIMIDEX) 1 MG tablet      rOPINIRole (REQUIP) 0.25 MG tablet 90 tablet 1     Sig: Take 1 tablet by mouth Every Night. Take 1 hour before bedtime.      Last office visit with prescribing clinician: 10/23/2023   Last telemedicine visit with prescribing clinician: Visit date not found   Next office visit with prescribing clinician: 1/24/2024                         Would you like a call back once the refill request has been completed: [] Yes [] No    If the office needs to give you a call back, can they leave a voicemail: [] Yes [] No    Selma Suarez LPN  10/27/23, 12:23 EDT

## 2023-10-31 RX ORDER — ANASTROZOLE 1 MG/1
TABLET ORAL
OUTPATIENT
Start: 2023-10-31

## 2023-10-31 NOTE — TELEPHONE ENCOUNTER
Rx Refill Note  Requested Prescriptions     Pending Prescriptions Disp Refills    anastrozole (ARIMIDEX) 1 MG tablet        Last office visit with prescribing clinician: 10/23/2023     Next office visit with prescribing clinician: 1/24/2024     Last filled 8/3/823 #90 By Saloni Patricia APRN Nannette Parkerson, LPN  10/31/23, 11:20 EDT

## 2023-11-06 ENCOUNTER — TELEPHONE (OUTPATIENT)
Dept: FAMILY MEDICINE CLINIC | Age: 83
End: 2023-11-06
Payer: MEDICARE

## 2023-11-15 DIAGNOSIS — G89.29 CHRONIC LEFT-SIDED LOW BACK PAIN WITH LEFT-SIDED SCIATICA: ICD-10-CM

## 2023-11-15 DIAGNOSIS — M54.42 CHRONIC LEFT-SIDED LOW BACK PAIN WITH LEFT-SIDED SCIATICA: ICD-10-CM

## 2023-11-15 NOTE — TELEPHONE ENCOUNTER
Caller: Paige Cantu    Relationship: Self    Best call back number: [phone]    Requested Prescriptions:   Requested Prescriptions     Pending Prescriptions Disp Refills    HYDROcodone-acetaminophen (NORCO) 5-325 MG per tablet 30 tablet 0     Sig: Take 1 tablet by mouth Daily As Needed for Moderate Pain.        Pharmacy where request should be sent: [facility] [id number] [facility] [place] - [address] [facility] - [phone]  - [phone] FX     Last office visit with prescribing clinician: 10/23/2023   Last telemedicine visit with prescribing clinician: Visit date not found   Next office visit with prescribing clinician: 1/24/2024     Additional details provided by patient: TOTALLY OUT OF MEDICATION     Does the patient have less than a 3 day supply:  [x] Yes  [] No    Would you like a call back once the refill request has been completed: [] Yes [] No    If the office needs to give you a call back, can they leave a voicemail: [] Yes [] No    Vianey Phillips Rep   11/15/23 14:07 EST

## 2023-11-15 NOTE — TELEPHONE ENCOUNTER
Rx Refill Note  Requested Prescriptions     Pending Prescriptions Disp Refills    HYDROcodone-acetaminophen (NORCO) 5-325 MG per tablet 30 tablet 0     Sig: Take 1 tablet by mouth Daily As Needed for Moderate Pain.      Last office visit with prescribing clinician: 10/23/2023   Last telemedicine visit with prescribing clinician: Visit date not found   Next office visit with prescribing clinician: 1/24/2024  Last refill sent: 10/18/23, #30  POC Urine Drug Screen [facility] (09/08/2022 14:45)     Dorys Parra LPN  11/15/23, 14:52 EST

## 2023-11-17 RX ORDER — HYDROCODONE BITARTRATE AND ACETAMINOPHEN 5; 325 MG/1; MG/1
1 TABLET ORAL DAILY PRN
Qty: 30 TABLET | Refills: 0 | Status: SHIPPED | OUTPATIENT
Start: 2023-11-17

## 2023-11-21 ENCOUNTER — OFFICE VISIT (OUTPATIENT)
Dept: FAMILY MEDICINE CLINIC | Age: 83
End: 2023-11-21
Payer: MEDICARE

## 2023-11-21 ENCOUNTER — HOSPITAL ENCOUNTER (OUTPATIENT)
Dept: GENERAL RADIOLOGY | Facility: HOSPITAL | Age: 83
Discharge: HOME | End: 2023-11-21
Admitting: NURSE PRACTITIONER
Payer: MEDICARE

## 2023-11-21 VITALS
SYSTOLIC BLOOD PRESSURE: 137 MMHG | WEIGHT: 139 LBS | HEART RATE: 87 BPM | DIASTOLIC BLOOD PRESSURE: 48 MMHG | OXYGEN SATURATION: 100 % | BODY MASS INDEX: 25.58 KG/M2 | HEIGHT: 62 IN

## 2023-11-21 DIAGNOSIS — M25.561 ACUTE PAIN OF RIGHT KNEE: Primary | ICD-10-CM

## 2023-11-21 DIAGNOSIS — M25.561 ACUTE PAIN OF RIGHT KNEE: ICD-10-CM

## 2023-11-21 PROCEDURE — 1160F RVW MEDS BY RX/DR IN RCRD: CPT | Performed by: NURSE PRACTITIONER

## 2023-11-21 PROCEDURE — 1159F MED LIST DOCD IN RCRD: CPT | Performed by: NURSE PRACTITIONER

## 2023-11-21 PROCEDURE — 73562 X-RAY EXAM OF KNEE 3: CPT

## 2023-11-21 PROCEDURE — 3078F DIAST BP <80 MM HG: CPT | Performed by: NURSE PRACTITIONER

## 2023-11-21 PROCEDURE — 3075F SYST BP GE 130 - 139MM HG: CPT | Performed by: NURSE PRACTITIONER

## 2023-11-21 PROCEDURE — 99213 OFFICE O/P EST LOW 20 MIN: CPT | Performed by: NURSE PRACTITIONER

## 2023-11-21 NOTE — ASSESSMENT & PLAN NOTE
Further treatment pending x-ray results.  Consider referral to orthopedics.  Takes hydrocodone for chronic back pain management

## 2023-11-21 NOTE — PROGRESS NOTES
Please call patient  Mild arthritic change of the right knee.  There is some hardening of the quadriceps tendon on the back of the leg which is consistent with tendinitis.  It is reasonable to refer her to physical therapy and orthopedics if she is agreeable.

## 2023-11-21 NOTE — PROGRESS NOTES
"Chief Complaint  Knee Problem (Patient c/o lump behind right knee for 3-4 weeks, causing pain but is not getting bigger, patient thinks this could be possible cyst )    Subjective          Paige Cantu presents to [facility]     Patient is an 82-year-old female who is here today with right knee pain unrelated to injury that started 3 to 4 weeks ago.  She wonders if she may be developing a Baker's cyst behind the right knee.  Has started wearing a knee brace with some relief.  Denies swelling.  Pain is worse with weightbearing.  Has not seen orthopedics previously or had imaging done of her knees.     Objective   Vital Signs:   Vitals:    11/21/23 1339   BP: 137/48   BP Location: Right arm   Patient Position: Sitting   Cuff Size: Adult   Pulse: 87   SpO2: 100%   Weight: 63 kg (139 lb)   Height: 158.1 cm (62.25\")   PainSc:   6   PainLoc: Knee       Wt Readings from Last 3 Encounters:   11/21/23 63 kg (139 lb)   10/23/23 64.7 kg (142 lb 9.6 oz)   07/13/23 61.7 kg (136 lb)      BP Readings from Last 3 Encounters:   11/21/23 137/48   10/23/23 150/65   07/13/23 152/72       Body mass index is 25.22 kg/m².             Physical Exam  Vitals reviewed.   Constitutional:       General: She is not in acute distress.     Appearance: Normal appearance. She is well-developed.   Cardiovascular:      Rate and Rhythm: Normal rate and regular rhythm.      Heart sounds: Normal heart sounds.   Pulmonary:      Effort: Pulmonary effort is normal.      Breath sounds: Normal breath sounds.   Musculoskeletal:      Right lower leg: No edema.      Left lower leg: No edema.        Legs:    Skin:     General: Skin is warm and dry.   Neurological:      General: No focal deficit present.      Mental Status: She is alert.   Psychiatric:         Attention and Perception: Attention normal.         Mood and Affect: Mood and affect normal.         Behavior: Behavior normal.           Current Outpatient Medications:     " anastrozole (ARIMIDEX) 1 MG tablet, , Disp: , Rfl:     atorvastatin (LIPITOR) 20 MG tablet, Take 1 tablet by mouth Daily., Disp: 90 tablet, Rfl: 1    hydroCHLOROthiazide (HYDRODIURIL) 25 MG tablet, Take 1 tablet by mouth Daily., Disp: 90 tablet, Rfl: 3    HYDROcodone-acetaminophen (NORCO) 5-325 MG per tablet, Take 1 tablet by mouth Daily As Needed for Moderate Pain., Disp: 30 tablet, Rfl: 0    lisinopril (PRINIVIL,ZESTRIL) 10 MG tablet, Take 1 tablet by mouth Daily., Disp: 90 tablet, Rfl: 1    rOPINIRole (REQUIP) 0.25 MG tablet, Take 1 tablet by mouth Every Night. 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           Social History     Tobacco Use   Smoking Status Never    Passive exposure: Never   Smokeless Tobacco Never           Assessment and Plan    Diagnoses and all orders for this visit:    1. Acute pain of right knee (Primary)  Assessment & Plan:  Further treatment pending x-ray results.  Consider referral to orthopedics.  Takes hydrocodone for chronic back pain management    Orders:  -     XR Knee 3 View Right; Future        Follow Up    No follow-ups on file.  Patient was given instructions and counseling regarding her condition or for health maintenance advice. Please see specific information pulled into the AVS if appropriate.

## 2023-11-22 ENCOUNTER — TELEPHONE (OUTPATIENT)
Dept: FAMILY MEDICINE CLINIC | Age: 83
End: 2023-11-22
Payer: MEDICARE

## 2023-11-22 DIAGNOSIS — M25.561 ACUTE PAIN OF RIGHT KNEE: Primary | ICD-10-CM

## 2023-12-18 DIAGNOSIS — G89.29 CHRONIC LEFT-SIDED LOW BACK PAIN WITH LEFT-SIDED SCIATICA: ICD-10-CM

## 2023-12-18 DIAGNOSIS — M54.42 CHRONIC LEFT-SIDED LOW BACK PAIN WITH LEFT-SIDED SCIATICA: ICD-10-CM

## 2023-12-18 NOTE — TELEPHONE ENCOUNTER
Rx Refill Note  Requested Prescriptions     Pending Prescriptions Disp Refills    HYDROcodone-acetaminophen (NORCO) 5-325 MG per tablet 30 tablet 0     Sig: Take 1 tablet by mouth Daily As Needed for Moderate Pain.      Last office visit with prescribing clinician: 10/23/2023     Next office visit with prescribing clinician: 1/24/2024    Last filled 11/17/23  #30    POC Urine Drug Screen [facility] (09/08/2022 14:45)     CONTROLLED SUBSTANCE AGREEMENT - SCAN - CONTROLLED SUBSTANCE EDUCATION. [facility], [id number] (09/08/2022)       Selma Suarez LPN  12/18/23, 14:26 EST

## 2023-12-18 NOTE — TELEPHONE ENCOUNTER
Caller: Paige Cantu    Relationship: Self    Best call back number: [phone]     Requested Prescriptions:   Requested Prescriptions     Pending Prescriptions Disp Refills    HYDROcodone-acetaminophen (NORCO) 5-325 MG per tablet 30 tablet 0     Sig: Take 1 tablet by mouth Daily As Needed for Moderate Pain.        Pharmacy where request should be sent: [facility] [id number] - [place], [place] - [address] [facility] - [phone]  - [phone] FX     Last office visit with prescribing clinician: 10/23/2023   Last telemedicine visit with prescribing clinician: Visit date not found   Next office visit with prescribing clinician: 1/24/2024     Does the patient have less than a 3 day supply:  [x] Yes  [] No    Vianey Sigala Rep   12/18/23 14:11 EST

## 2023-12-21 RX ORDER — HYDROCODONE BITARTRATE AND ACETAMINOPHEN 5; 325 MG/1; MG/1
1 TABLET ORAL DAILY PRN
Qty: 30 TABLET | Refills: 0 | Status: SHIPPED | OUTPATIENT
Start: 2023-12-21

## 2024-01-19 DIAGNOSIS — M54.42 CHRONIC LEFT-SIDED LOW BACK PAIN WITH LEFT-SIDED SCIATICA: ICD-10-CM

## 2024-01-19 DIAGNOSIS — G89.29 CHRONIC LEFT-SIDED LOW BACK PAIN WITH LEFT-SIDED SCIATICA: ICD-10-CM

## 2024-01-19 NOTE — TELEPHONE ENCOUNTER
Rx Refill Note  Requested Prescriptions     Pending Prescriptions Disp Refills    HYDROcodone-acetaminophen (NORCO) 5-325 MG per tablet 30 tablet 0     Sig: Take 1 tablet by mouth Daily As Needed for Moderate Pain.      Last office visit with prescribing clinician: 10/23/2023   Last telemedicine visit with prescribing clinician: Visit date not found   Next office visit with prescribing clinician: 1/24/2024     Nathalie Sales LPN  01/19/24, 12:51 EST    LF-12/21/23 #30  UDS-POC Urine Drug Screen [facility] (09/08/2022 14:45)

## 2024-01-19 NOTE — TELEPHONE ENCOUNTER
Caller: Paige Cantu    Relationship: Self    Best call back number: [phone]    Requested Prescriptions:   Requested Prescriptions     Pending Prescriptions Disp Refills    HYDROcodone-acetaminophen (NORCO) 5-325 MG per tablet 30 tablet 0     Sig: Take 1 tablet by mouth Daily As Needed for Moderate Pain.        Pharmacy where request should be sent: [facility] [id number] - [facility] [place] - [address] [facility] - [phone]  - [phone] FX     Last office visit with prescribing clinician: 10/23/2023   Last telemedicine visit with prescribing clinician: Visit date not found   Next office visit with prescribing clinician: 1/24/2024       Does the patient have less than a 3 day supply:  [x] Yes  [] No      Vianey Leyva Rep   01/19/24 12:46 EST

## 2024-01-20 DIAGNOSIS — I10 ESSENTIAL HYPERTENSION: ICD-10-CM

## 2024-01-20 DIAGNOSIS — G89.29 CHRONIC LEFT-SIDED LOW BACK PAIN WITH LEFT-SIDED SCIATICA: ICD-10-CM

## 2024-01-20 DIAGNOSIS — M54.42 CHRONIC LEFT-SIDED LOW BACK PAIN WITH LEFT-SIDED SCIATICA: ICD-10-CM

## 2024-01-20 RX ORDER — HYDROCODONE BITARTRATE AND ACETAMINOPHEN 5; 325 MG/1; MG/1
1 TABLET ORAL DAILY PRN
Qty: 30 TABLET | Refills: 0 | Status: SHIPPED | OUTPATIENT
Start: 2024-01-20

## 2024-01-20 RX ORDER — HYDROCHLOROTHIAZIDE 25 MG/1
25 TABLET ORAL DAILY
Qty: 90 TABLET | Refills: 3 | Status: SHIPPED | OUTPATIENT
Start: 2024-01-20

## 2024-01-22 NOTE — TELEPHONE ENCOUNTER
Hydrocodone went to [facility] pharmacy and should have been sent to local [facility] pharmacy.

## 2024-01-22 NOTE — TELEPHONE ENCOUNTER
Caller: Paige Cantu    Relationship: Self    Best call back number: [phone]     Requested Prescriptions:   Requested Prescriptions     Pending Prescriptions Disp Refills    HYDROcodone-acetaminophen (NORCO) 5-325 MG per tablet 30 tablet 0     Sig: Take 1 tablet by mouth Daily As Needed for Moderate Pain.        Pharmacy where request should be sent: [facility] [id number] - [facility] [place] - [address] [facility] - [phone]  - [phone] FX     Last office visit with prescribing clinician: 10/23/2023   Last telemedicine visit with prescribing clinician: Visit date not found   Next office visit with prescribing clinician: 1/24/2024     Additional details provided by patient: PATIENT STATED THAT SHE HAS BEEN OUT OF THE MEDICATION FOR 1 DAY     Does the patient have less than a 3 day supply:  [x] Yes  [] No      Vianey Schumacher Rep   01/22/24 13:02 EST

## 2024-01-23 RX ORDER — HYDROCODONE BITARTRATE AND ACETAMINOPHEN 5; 325 MG/1; MG/1
1 TABLET ORAL DAILY PRN
Qty: 30 TABLET | Refills: 0 | Status: SHIPPED | OUTPATIENT
Start: 2024-01-23

## 2024-01-24 ENCOUNTER — OFFICE VISIT (OUTPATIENT)
Dept: FAMILY MEDICINE CLINIC | Age: 84
End: 2024-01-24
Payer: OTHER MISCELLANEOUS

## 2024-01-24 VITALS
WEIGHT: 138.6 LBS | HEIGHT: 62 IN | HEART RATE: 82 BPM | SYSTOLIC BLOOD PRESSURE: 155 MMHG | BODY MASS INDEX: 25.51 KG/M2 | DIASTOLIC BLOOD PRESSURE: 82 MMHG

## 2024-01-24 DIAGNOSIS — G89.29 CHRONIC LEFT-SIDED LOW BACK PAIN WITH LEFT-SIDED SCIATICA: Primary | ICD-10-CM

## 2024-01-24 DIAGNOSIS — M54.42 CHRONIC LEFT-SIDED LOW BACK PAIN WITH LEFT-SIDED SCIATICA: Primary | ICD-10-CM

## 2024-01-24 PROBLEM — M25.561 ACUTE PAIN OF RIGHT KNEE: Status: RESOLVED | Noted: 2023-11-21 | Resolved: 2024-01-24

## 2024-01-24 PROCEDURE — 99213 OFFICE O/P EST LOW 20 MIN: CPT | Performed by: FAMILY MEDICINE

## 2024-01-24 RX ORDER — ROPINIROLE 0.5 MG/1
0.5 TABLET, FILM COATED ORAL NIGHTLY
Qty: 90 TABLET | Refills: 1 | Status: SHIPPED | OUTPATIENT
Start: 2024-01-24

## 2024-01-24 NOTE — PROGRESS NOTES
Chief Complaint  Back Pain (Chronic, workmans comp follow up)    Subjective          Paige Cantu presents to [facility]  History of Present Illness  --CHRONIC LOW BACK PAIN WITH LEFT SCIATICA AND A COMPONENT OF RLS.  PERSISTENT SINCE A WORK INJURY SEVERAL YEARS AGO.  TAKES LOW DOSE NARCOTICS ON A ROUTINE BASIS WITH THE ADDITION OF REQUIP.  THE RLS PRTION OF HER SYMPTOMS HAS GOTTEN WORSE.          No Known Allergies     Health Maintenance Due   Topic Date Due    ZOSTER VACCINE (1 of 2) Never done    TDAP/TD VACCINES (2 - Tdap) 10/07/2020    COVID-19 Vaccine (3 - 2023-24 season) 09/01/2023    LIPID PANEL  10/13/2023        Current Outpatient Medications on File Prior to Visit   Medication Sig    anastrozole (ARIMIDEX) 1 MG tablet     atorvastatin (LIPITOR) 20 MG tablet Take 1 tablet by mouth Daily.    hydroCHLOROthiazide (HYDRODIURIL) 25 MG tablet Take 1 tablet by mouth Daily.    HYDROcodone-acetaminophen (NORCO) 5-325 MG per tablet Take 1 tablet by mouth Daily As Needed for Moderate Pain.    lisinopril (PRINIVIL,ZESTRIL) 10 MG tablet Take 1 tablet by mouth Daily.    [DISCONTINUED] rOPINIRole (REQUIP) 0.25 MG tablet Take 1 tablet by mouth Every Night. Take 1 hour before bedtime.    [DISCONTINUED] HYDROcodone-acetaminophen (NORCO) 5-325 MG per tablet Take 1 tablet by mouth Daily As Needed for Moderate Pain.     No current facility-administered medications on file prior to visit.       Immunization History   Administered Date(s) Administered    COVID-19 (MODERNA) 1st,2nd,3rd Dose Monovalent 01/26/2021, 02/23/2021    Fluzone High Dose =>65 Years ([employer]axcare ONLY) 01/29/2018, 12/28/2018    Fluzone High-Dose 65+yrs 01/05/2022, 12/08/2022, 10/23/2023    Influenza TIV (IM) 10/26/2012    Influenza, Unspecified 09/30/2020    Pneumococcal Conjugate 13-Valent (PCV13) 08/11/2015    Pneumococcal Polysaccharide (PPSV23) 01/01/2006    Td (TDVAX) 10/07/2010       Review of Systems   Constitutional:   "Negative for activity change, appetite change, chills, fatigue and fever.   HENT:  Negative for congestion, ear pain, rhinorrhea and sore throat.    Respiratory:  Negative for cough and shortness of breath.    Cardiovascular:  Negative for chest pain, palpitations and leg swelling.   Gastrointestinal:  Negative for abdominal pain, constipation, diarrhea, nausea and vomiting.   Musculoskeletal:  Negative for arthralgias and myalgias.   Neurological:  Negative for headache.        Objective     /82 (BP Location: Left arm, Patient Position: Sitting)   Pulse 82   Ht 158.1 cm (62.25\")   Wt 62.9 kg (138 lb 9.6 oz)   BMI 25.15 kg/m²       Physical Exam  Vitals and nursing note reviewed.   Constitutional:       General: She is not in acute distress.     Appearance: Normal appearance.   Cardiovascular:      Rate and Rhythm: Normal rate and regular rhythm.      Heart sounds: Normal heart sounds. No murmur heard.  Pulmonary:      Effort: Pulmonary effort is normal.      Breath sounds: Normal breath sounds.   Abdominal:      Palpations: Abdomen is soft.      Tenderness: There is no abdominal tenderness.   Musculoskeletal:      Cervical back: Neck supple.      Right lower leg: No edema.      Left lower leg: No edema.      Comments: LOW BACK WITH LIMITED ROM AND POSITIVE LEFT SLR.     Lymphadenopathy:      Cervical: No cervical adenopathy.   Neurological:      General: No focal deficit present.      Mental Status: She is alert.      Cranial Nerves: No cranial nerve deficit.      Coordination: Coordination normal.      Gait: Gait normal.   Psychiatric:         Mood and Affect: Mood normal.         Behavior: Behavior normal.         Result Review :                             Assessment and Plan      Diagnoses and all orders for this visit:    1. Chronic left-sided low back pain with left-sided sciatica (Primary)  Assessment & Plan:  THIS IS AN OVERALL STABLE SITUATION ALTHOUGH THE RLS COMPONENT OF JHER SYMPTOMS HAS " WORSENED.  WILL CNTINUE THE ONCE DAILY HYDROCODONE BUT INCREASE THE REQUIP.    STABLE ON CURRENT MEDICATION.  AZRA REVIEWED.  TOX SCREEN IS UP TO DATE.  THE CONTINUED USE OF A CONTROLLED SUBSTANCE IS NECESSARY FOR THIS PATIENT TO HAVE A NEAR NORMAL QUALITY OF LIFE AND WILL BE REVIEWED AT THE NEXT ROUTINE VISIT.    Orders:  -     rOPINIRole (REQUIP) 0.5 MG tablet; Take 1 tablet by mouth Every Night. Take 1 hour before bedtime.  Dispense: 90 tablet; Refill: 1            Follow Up     Return in about 3 months (around 4/24/2024).    Patient was given instructions and counseling regarding her condition or for health maintenance advice. Please see specific information pulled into the AVS if appropriate.

## 2024-01-24 NOTE — ASSESSMENT & PLAN NOTE
THIS IS AN OVERALL STABLE SITUATION ALTHOUGH THE RLS COMPONENT OF JHER SYMPTOMS HAS WORSENED.  WILL CNTINUE THE ONCE DAILY HYDROCODONE BUT INCREASE THE REQUIP.    STABLE ON CURRENT MEDICATION.  AZRA REVIEWED.  TOX SCREEN IS UP TO DATE.  THE CONTINUED USE OF A CONTROLLED SUBSTANCE IS NECESSARY FOR THIS PATIENT TO HAVE A NEAR NORMAL QUALITY OF LIFE AND WILL BE REVIEWED AT THE NEXT ROUTINE VISIT.

## 2024-02-07 DIAGNOSIS — G89.29 CHRONIC LEFT-SIDED LOW BACK PAIN WITH LEFT-SIDED SCIATICA: ICD-10-CM

## 2024-02-07 DIAGNOSIS — M54.42 CHRONIC LEFT-SIDED LOW BACK PAIN WITH LEFT-SIDED SCIATICA: ICD-10-CM

## 2024-02-07 DIAGNOSIS — I10 ESSENTIAL HYPERTENSION: ICD-10-CM

## 2024-02-07 RX ORDER — ROPINIROLE 0.5 MG/1
0.5 TABLET, FILM COATED ORAL NIGHTLY
Qty: 90 TABLET | Refills: 1 | Status: SHIPPED | OUTPATIENT
Start: 2024-02-07

## 2024-02-07 RX ORDER — HYDROCHLOROTHIAZIDE 25 MG/1
25 TABLET ORAL DAILY
Qty: 90 TABLET | Refills: 3 | Status: SHIPPED | OUTPATIENT
Start: 2024-02-07

## 2024-02-07 NOTE — TELEPHONE ENCOUNTER
Pt called back and said she has changed pharmacies to [facility].  These were sent to [facility] pharmacy.  Will resend to [facility].

## 2024-02-23 ENCOUNTER — TELEPHONE (OUTPATIENT)
Dept: FAMILY MEDICINE CLINIC | Age: 84
End: 2024-02-23
Payer: MEDICARE

## 2024-02-23 DIAGNOSIS — G89.29 CHRONIC LEFT-SIDED LOW BACK PAIN WITH LEFT-SIDED SCIATICA: ICD-10-CM

## 2024-02-23 DIAGNOSIS — M54.42 CHRONIC LEFT-SIDED LOW BACK PAIN WITH LEFT-SIDED SCIATICA: ICD-10-CM

## 2024-02-23 NOTE — TELEPHONE ENCOUNTER
Caller: AlondraPaige    Relationship: Self    Best call back number:     [phone]       Requested Prescriptions:   Requested Prescriptions     Pending Prescriptions Disp Refills    HYDROcodone-acetaminophen (NORCO) 5-325 MG per tablet 30 tablet 0     Sig: Take 1 tablet by mouth Daily As Needed for Moderate Pain.        Pharmacy where request should be sent: [facility] [id number] - [facility] [place] - [address] [facility] - [phone]  - [phone] FX     Last office visit with prescribing clinician: 1/24/2024   Last telemedicine visit with prescribing clinician: Visit date not found   Next office visit with prescribing clinician: 4/24/2024     Additional details provided by patient:     Does the patient have less than a 3 day supply:  [x] Yes  [] No    Would you like a call back once the refill request has been completed: [] Yes [x] No    If the office needs to give you a call back, can they leave a voicemail: [] Yes [x] No    Vianey Barth   02/23/24 14:07 EST

## 2024-02-23 NOTE — TELEPHONE ENCOUNTER
Rx Refill Note  Requested Prescriptions     Pending Prescriptions Disp Refills    HYDROcodone-acetaminophen (NORCO) 5-325 MG per tablet 30 tablet 0     Sig: Take 1 tablet by mouth Daily As Needed for Moderate Pain.      Last office visit with prescribing clinician: 1/24/2024       Next office visit with prescribing clinician: 4/24/2024     Last filled 1/23/24 #30    POC Urine Drug Screen [facility] (09/08/2022 14:45)     Selma Suarez LPN  02/23/24, 14:13 EST

## 2024-02-24 RX ORDER — HYDROCODONE BITARTRATE AND ACETAMINOPHEN 5; 325 MG/1; MG/1
1 TABLET ORAL DAILY PRN
Qty: 30 TABLET | Refills: 0 | Status: SHIPPED | OUTPATIENT
Start: 2024-02-24

## 2024-03-22 DIAGNOSIS — G89.29 CHRONIC LEFT-SIDED LOW BACK PAIN WITH LEFT-SIDED SCIATICA: ICD-10-CM

## 2024-03-22 DIAGNOSIS — M54.42 CHRONIC LEFT-SIDED LOW BACK PAIN WITH LEFT-SIDED SCIATICA: ICD-10-CM

## 2024-03-22 RX ORDER — HYDROCODONE BITARTRATE AND ACETAMINOPHEN 5; 325 MG/1; MG/1
1 TABLET ORAL DAILY PRN
Qty: 30 TABLET | Refills: 0 | Status: SHIPPED | OUTPATIENT
Start: 2024-03-22

## 2024-03-22 NOTE — TELEPHONE ENCOUNTER
Caller: AlondraPaige    Relationship: Self    Best call back number: [phone]    Requested Prescriptions:   Requested Prescriptions     Pending Prescriptions Disp Refills    HYDROcodone-acetaminophen (NORCO) 5-325 MG per tablet 30 tablet 0     Sig: Take 1 tablet by mouth Daily As Needed for Moderate Pain.        Pharmacy where request should be sent: [facility] [id number] [place], [place] - [address] [facility] - [phone]  - [phone] FX     Last office visit with prescribing clinician: 1/24/2024   Last telemedicine visit with prescribing clinician: Visit date not found   Next office visit with prescribing clinician: 4/24/2024     Additional details provided by patient:    Does the patient have less than a 3 day supply:  [x] Yes  [] No    Would you like a call back once the refill request has been completed: [x] Yes [] No    If the office needs to give you a call back, can they leave a voicemail: [x] Yes [] No    Vianey Dave   03/22/24 15:01 EDT

## 2024-04-18 DIAGNOSIS — M54.42 CHRONIC LEFT-SIDED LOW BACK PAIN WITH LEFT-SIDED SCIATICA: ICD-10-CM

## 2024-04-18 DIAGNOSIS — G89.29 CHRONIC LEFT-SIDED LOW BACK PAIN WITH LEFT-SIDED SCIATICA: ICD-10-CM

## 2024-04-18 NOTE — TELEPHONE ENCOUNTER
Caller: AlondraPaige    Relationship: Self    Best call back number:     [phone]        Requested Prescriptions:   Requested Prescriptions     Pending Prescriptions Disp Refills    HYDROcodone-acetaminophen (NORCO) 5-325 MG per tablet 30 tablet 0     Sig: Take 1 tablet by mouth Daily As Needed for Moderate Pain.        Pharmacy where request should be sent: [facility] [id number] - [place], [place] - [address] [facility] - [phone]  - [phone] FX     Last office visit with prescribing clinician: 1/24/2024   Last telemedicine visit with prescribing clinician: Visit date not found   Next office visit with prescribing clinician: 4/24/2024     Additional details provided by patient:     Does the patient have less than a 3 day supply:  [x] Yes  [] No    Would you like a call back once the refill request has been completed: [] Yes [x] No    If the office needs to give you a call back, can they leave a voicemail: [] Yes [x] No    Vianey Barth   04/18/24 16:08 EDT          Trilobed Flap Text: The defect edges were debeveled with a #15 scalpel blade.  Given the location of the defect and the proximity to free margins a trilobed flap was deemed most appropriate.  Using a sterile surgical marker, an appropriate trilobed flap drawn around the defect.    The area thus outlined was incised deep to adipose tissue with a #15 scalpel blade.  The skin margins were undermined to an appropriate distance in all directions utilizing iris scissors.

## 2024-04-18 NOTE — TELEPHONE ENCOUNTER
Rx Refill Note  Requested Prescriptions     Pending Prescriptions Disp Refills    HYDROcodone-acetaminophen (NORCO) 5-325 MG per tablet 30 tablet 0     Sig: Take 1 tablet by mouth Daily As Needed for Moderate Pain.      Last office visit with prescribing clinician: 1/24/2024       Next office visit with prescribing clinician: 4/24/2024    Last filled   3/22/24  #30      POC Urine Drug Screen [facility] (09/08/2022 14:45)    Selma Suarze LPN  04/18/24, 16:30 EDT

## 2024-04-20 RX ORDER — HYDROCODONE BITARTRATE AND ACETAMINOPHEN 5; 325 MG/1; MG/1
1 TABLET ORAL DAILY PRN
Qty: 30 TABLET | Refills: 0 | Status: SHIPPED | OUTPATIENT
Start: 2024-04-20

## 2024-04-24 ENCOUNTER — OFFICE VISIT (OUTPATIENT)
Dept: FAMILY MEDICINE CLINIC | Age: 84
End: 2024-04-24
Payer: OTHER MISCELLANEOUS

## 2024-04-24 VITALS
BODY MASS INDEX: 25.58 KG/M2 | HEIGHT: 62 IN | WEIGHT: 139 LBS | DIASTOLIC BLOOD PRESSURE: 76 MMHG | HEART RATE: 73 BPM | SYSTOLIC BLOOD PRESSURE: 148 MMHG | TEMPERATURE: 97.8 F

## 2024-04-24 DIAGNOSIS — G89.29 CHRONIC LEFT-SIDED LOW BACK PAIN WITH LEFT-SIDED SCIATICA: ICD-10-CM

## 2024-04-24 DIAGNOSIS — E78.00 HIGH CHOLESTEROL: ICD-10-CM

## 2024-04-24 DIAGNOSIS — Z85.3 HISTORY OF BREAST CANCER: ICD-10-CM

## 2024-04-24 DIAGNOSIS — M54.42 CHRONIC LEFT-SIDED LOW BACK PAIN WITH LEFT-SIDED SCIATICA: ICD-10-CM

## 2024-04-24 DIAGNOSIS — I10 ESSENTIAL HYPERTENSION: ICD-10-CM

## 2024-04-24 DIAGNOSIS — Z79.899 LONG-TERM USE OF HIGH-RISK MEDICATION: Primary | ICD-10-CM

## 2024-04-24 PROBLEM — N18.31 STAGE 3A CHRONIC KIDNEY DISEASE: Status: ACTIVE | Noted: 2024-04-24

## 2024-04-24 LAB
AMPHET+METHAMPHET UR QL: NEGATIVE
AMPHETAMINES UR QL: NEGATIVE
BARBITURATES UR QL SCN: NEGATIVE
BENZODIAZ UR QL SCN: NEGATIVE
BUPRENORPHINE SERPL-MCNC: NEGATIVE NG/ML
CANNABINOIDS SERPL QL: NEGATIVE
COCAINE UR QL: NEGATIVE
EXPIRATION DATE: ABNORMAL
Lab: ABNORMAL
MDMA UR QL SCN: NEGATIVE
METHADONE UR QL SCN: NEGATIVE
MORPHINE/OPIATES SCREEN, URINE: POSITIVE
OXYCODONE UR QL SCN: NEGATIVE
PCP UR QL SCN: NEGATIVE

## 2024-04-24 PROCEDURE — 80305 DRUG TEST PRSMV DIR OPT OBS: CPT | Performed by: FAMILY MEDICINE

## 2024-04-24 PROCEDURE — 99214 OFFICE O/P EST MOD 30 MIN: CPT | Performed by: FAMILY MEDICINE

## 2024-04-24 RX ORDER — MELOXICAM 15 MG/1
15 TABLET ORAL DAILY
COMMUNITY
Start: 2024-02-20

## 2024-04-24 NOTE — PROGRESS NOTES
Chief Complaint  Back Pain (3 month follow up )    Subjective          Paige Cantu presents to [facility]  History of Present Illness  --LAST LIPIDS WERE OK, NO ISSUES WITH THE STATIN  --TOLERATING BLOOD PRESSURE MEDICATION WITHOUT APPARENT SIDE EFFECTS  --HISTORY OF BREAST CANCER S/P A LUMPECTOMY, RECENT MAMMOGRAM WAS OK  --CONTINUES ON ROUTINE LOW DOSE NARCOTICS FOR CHRONIC LOW BACK PAIN, STABLE        No Known Allergies     Health Maintenance Due   Topic Date Due    ZOSTER VACCINE (1 of 2) Never done    TDAP/TD VACCINES (2 - Tdap) 10/07/2020    LIPID PANEL  10/13/2023    ANNUAL WELLNESS VISIT  04/07/2024        Current Outpatient Medications on File Prior to Visit   Medication Sig    anastrozole (ARIMIDEX) 1 MG tablet     atorvastatin (LIPITOR) 20 MG tablet Take 1 tablet by mouth Daily.    hydroCHLOROthiazide (HYDRODIURIL) 25 MG tablet Take 1 tablet by mouth Daily.    HYDROcodone-acetaminophen (NORCO) 5-325 MG per tablet Take 1 tablet by mouth Daily As Needed for Moderate Pain.    lisinopril (PRINIVIL,ZESTRIL) 10 MG tablet Take 1 tablet by mouth Daily.    meloxicam (MOBIC) 15 MG tablet Take 1 tablet by mouth Daily.    rOPINIRole (REQUIP) 0.5 MG tablet Take 1 tablet by mouth Every Night. Take 1 hour before bedtime.     No current facility-administered medications on file prior to visit.       Immunization History   Administered Date(s) Administered    COVID-19 (MODERNA) 1st,2nd,3rd Dose Monovalent 01/26/2021, 02/23/2021    Fluzone High Dose =>65 Years ([employer] ONLY) 01/29/2018, 12/28/2018    Fluzone High-Dose 65+yrs 01/05/2022, 12/08/2022, 10/23/2023    Influenza TIV (IM) 10/26/2012    Influenza, Unspecified 09/30/2020    Pneumococcal Conjugate 13-Valent (PCV13) 08/11/2015    Pneumococcal Polysaccharide (PPSV23) 01/01/2006    Td (TDVAX) 10/07/2010       Review of Systems   Constitutional:  Negative for activity change, appetite change, chills, fatigue and fever.   HENT:  Negative  "for congestion, ear pain, rhinorrhea and sore throat.    Respiratory:  Negative for cough and shortness of breath.    Cardiovascular:  Negative for chest pain, palpitations and leg swelling.   Gastrointestinal:  Negative for abdominal pain, constipation, diarrhea, nausea and vomiting.   Musculoskeletal:  Negative for arthralgias and myalgias.   Neurological:  Negative for headache.        Objective     /76 (BP Location: Left arm, Patient Position: Sitting, Cuff Size: Adult)   Pulse 73   Temp 97.8 °F (36.6 °C) (Oral)   Ht 158.1 cm (62.25\")   Wt 63 kg (139 lb)   BMI 25.22 kg/m²       Physical Exam  Vitals and nursing note reviewed.   Constitutional:       General: She is not in acute distress.     Appearance: Normal appearance.   Cardiovascular:      Rate and Rhythm: Normal rate and regular rhythm.      Heart sounds: Normal heart sounds. No murmur heard.  Pulmonary:      Effort: Pulmonary effort is normal.      Breath sounds: Normal breath sounds.   Abdominal:      Palpations: Abdomen is soft.      Tenderness: There is no abdominal tenderness.   Musculoskeletal:      Cervical back: Neck supple.      Right lower leg: No edema.      Left lower leg: No edema.   Lymphadenopathy:      Cervical: No cervical adenopathy.   Neurological:      General: No focal deficit present.      Mental Status: She is alert.      Cranial Nerves: No cranial nerve deficit.      Coordination: Coordination normal.      Gait: Gait normal.   Psychiatric:         Mood and Affect: Mood normal.         Behavior: Behavior normal.         Result Review :                             Assessment and Plan      Diagnoses and all orders for this visit:    1. Long-term use of high-risk medication (Primary)  -     POC Medline 12 Panel Urine Drug Screen    2. Chronic left-sided low back pain with left-sided sciatica    3. Essential hypertension  Assessment & Plan:  Hypertension is stable and controlled  Continue current treatment regimen.  Blood " pressure will be reassessed in 6 months.      4. High cholesterol  Assessment & Plan:   Lipid abnormalities are stable    Plan:  Continue same medication/s without change.      Discussed medication dosage, use, side effects, and goals of treatment in detail.    Counseled patient on lifestyle modifications to help control hyperlipidemia.     Patient Treatment Goals:   LDL goal is less than 70    Followup in 6 months.      5. History of breast cancer  Assessment & Plan:  DOING WELL, PLANS PER ONCOLOGY               Follow Up     Return in about 3 months (around 7/24/2024).    Patient was given instructions and counseling regarding her condition or for health maintenance advice. Please see specific information pulled into the AVS if appropriate.

## 2024-05-20 ENCOUNTER — LAB (OUTPATIENT)
Dept: LAB | Facility: HOSPITAL | Age: 84
End: 2024-05-20
Payer: MEDICARE

## 2024-05-20 ENCOUNTER — OFFICE VISIT (OUTPATIENT)
Dept: FAMILY MEDICINE CLINIC | Age: 84
End: 2024-05-20
Payer: MEDICARE

## 2024-05-20 VITALS
DIASTOLIC BLOOD PRESSURE: 74 MMHG | SYSTOLIC BLOOD PRESSURE: 136 MMHG | HEART RATE: 68 BPM | HEIGHT: 62 IN | WEIGHT: 143.2 LBS | BODY MASS INDEX: 26.35 KG/M2

## 2024-05-20 DIAGNOSIS — E78.00 HIGH CHOLESTEROL: ICD-10-CM

## 2024-05-20 DIAGNOSIS — G89.29 CHRONIC LEFT-SIDED LOW BACK PAIN WITH LEFT-SIDED SCIATICA: ICD-10-CM

## 2024-05-20 DIAGNOSIS — I10 ESSENTIAL HYPERTENSION: ICD-10-CM

## 2024-05-20 DIAGNOSIS — Z00.00 MEDICARE ANNUAL WELLNESS VISIT, SUBSEQUENT: Primary | ICD-10-CM

## 2024-05-20 DIAGNOSIS — M54.42 CHRONIC LEFT-SIDED LOW BACK PAIN WITH LEFT-SIDED SCIATICA: ICD-10-CM

## 2024-05-20 LAB
CHOLEST SERPL-MCNC: 165 MG/DL (ref 0–200)
HDLC SERPL-MCNC: 67 MG/DL (ref 40–60)
LDLC SERPL CALC-MCNC: 58 MG/DL (ref 0–100)
LDLC/HDLC SERPL: 0.71 {RATIO}
TRIGL SERPL-MCNC: 253 MG/DL (ref 0–150)
VLDLC SERPL-MCNC: 40 MG/DL (ref 5–40)

## 2024-05-20 PROCEDURE — 36415 COLL VENOUS BLD VENIPUNCTURE: CPT | Performed by: FAMILY MEDICINE

## 2024-05-20 PROCEDURE — 80061 LIPID PANEL: CPT | Performed by: FAMILY MEDICINE

## 2024-05-20 RX ORDER — HYDROCHLOROTHIAZIDE 25 MG/1
25 TABLET ORAL DAILY
Qty: 90 TABLET | Refills: 3 | Status: CANCELLED | OUTPATIENT
Start: 2024-05-20

## 2024-05-20 RX ORDER — ATORVASTATIN CALCIUM 20 MG/1
20 TABLET, FILM COATED ORAL DAILY
Qty: 90 TABLET | Refills: 3 | Status: SHIPPED | OUTPATIENT
Start: 2024-05-20

## 2024-05-20 RX ORDER — HYDROCODONE BITARTRATE AND ACETAMINOPHEN 5; 325 MG/1; MG/1
1 TABLET ORAL DAILY PRN
Qty: 30 TABLET | Refills: 0 | Status: SHIPPED | OUTPATIENT
Start: 2024-05-20

## 2024-05-20 RX ORDER — ATORVASTATIN CALCIUM 20 MG/1
20 TABLET, FILM COATED ORAL DAILY
Qty: 90 TABLET | Refills: 1 | Status: CANCELLED | OUTPATIENT
Start: 2024-05-20

## 2024-05-20 RX ORDER — ROPINIROLE 0.5 MG/1
0.5 TABLET, FILM COATED ORAL NIGHTLY
Qty: 90 TABLET | Refills: 3 | Status: SHIPPED | OUTPATIENT
Start: 2024-05-20

## 2024-05-20 RX ORDER — ROPINIROLE 0.5 MG/1
0.5 TABLET, FILM COATED ORAL NIGHTLY
Qty: 90 TABLET | Refills: 1 | Status: CANCELLED | OUTPATIENT
Start: 2024-05-20

## 2024-05-20 RX ORDER — HYDROCHLOROTHIAZIDE 25 MG/1
25 TABLET ORAL DAILY
Qty: 90 TABLET | Refills: 3 | Status: SHIPPED | OUTPATIENT
Start: 2024-05-20

## 2024-05-20 NOTE — ASSESSMENT & PLAN NOTE
Lipid abnormalities are stable    Plan:  Continue same medication/s without change.      Discussed medication dosage, use, side effects, and goals of treatment in detail.    Counseled patient on lifestyle modifications to help control hyperlipidemia.     Patient Treatment Goals:   LDL goal is less than 70    Followup in 6 months  UNKNOWN DEGREE OF CONTROL, WILL OBTAIN LABS AND PROCEED ACCORDINGLY .

## 2024-05-20 NOTE — PROGRESS NOTES
The ABCs of the Annual Wellness Visit  Subsequent Medicare Wellness Visit    Subjective    Paige Cantu is a 83 y.o. female who presents for a Subsequent Medicare Wellness Visit.    The following portions of the patient's history were reviewed and   updated as appropriate: allergies, current medications, past family history, past medical history, past social history, past surgical history, and problem list.    Compared to one year ago, the patient feels her physical   health is the same.    Compared to one year ago, the patient feels her mental   health is the same.    Recent Hospitalizations:  She was not admitted to the hospital during the last year.       Current Medical Providers:  Patient Care Team:  Reza Dougherty MD as PCP - General (Family Medicine)    Outpatient Medications Prior to Visit   Medication Sig Dispense Refill    anastrozole (ARIMIDEX) 1 MG tablet       lisinopril (PRINIVIL,ZESTRIL) 10 MG tablet Take 1 tablet by mouth Daily. 90 tablet 1    meloxicam (MOBIC) 15 MG tablet Take 1 tablet by mouth Daily.      atorvastatin (LIPITOR) 20 MG tablet Take 1 tablet by mouth Daily. 90 tablet 1    hydroCHLOROthiazide (HYDRODIURIL) 25 MG tablet Take 1 tablet by mouth Daily. 90 tablet 3    HYDROcodone-acetaminophen (NORCO) 5-325 MG per tablet Take 1 tablet by mouth Daily As Needed for Moderate Pain. 30 tablet 0    rOPINIRole (REQUIP) 0.5 MG tablet Take 1 tablet by mouth Every Night. Take 1 hour before bedtime. 90 tablet 1     No facility-administered medications prior to visit.       Opioid medication/s are on active medication list.  and I have evaluated her active treatment plan and pain score trends (see table).  Vitals:    05/20/24 1402   PainSc: 0-No pain     I have reviewed the chart for potential of high risk medication and harmful drug interactions in the elderly.          Aspirin is not on active medication list.  Aspirin use is not indicated based on review of current medical condition/s.  "Risk of harm outweighs potential benefits.  .    Patient Active Problem List   Diagnosis    Chronic left-sided low back pain with left-sided sciatica    High cholesterol    Essential hypertension    History of breast cancer    Medicare annual wellness visit, subsequent    Stage 3a chronic kidney disease     Advance Care Planning   Advance Care Planning     Advance Directive is not on file.  ACP discussion was held with the patient during this visit. Patient has an advance directive (not in EMR), copy requested.     Objective    Vitals:    24 1402 24 1404   BP: (!) 162/116 136/74   BP Location: Left arm Left arm   Patient Position: Sitting Sitting   Pulse: 81 68   Weight: 65 kg (143 lb 3.2 oz)    Height: 158.1 cm (62.25\")    PainSc: 0-No pain      Estimated body mass index is 25.98 kg/m² as calculated from the following:    Height as of this encounter: 158.1 cm (62.25\").    Weight as of this encounter: 65 kg (143 lb 3.2 oz).           Does the patient have evidence of cognitive impairment? No          HEALTH RISK ASSESSMENT    Smoking Status:  Social History     Tobacco Use   Smoking Status Never    Passive exposure: Never   Smokeless Tobacco Never     Alcohol Consumption:  Social History     Substance and Sexual Activity   Alcohol Use None     Fall Risk Screen:    STEADI Fall Risk Assessment was completed, and patient is at LOW risk for falls.Assessment completed on:2024    Depression Screenin/20/2024     1:59 PM   PHQ-2/PHQ-9 Depression Screening   Little Interest or Pleasure in Doing Things 0-->not at all   Feeling Down, Depressed or Hopeless 0-->not at all   PHQ-9: Brief Depression Severity Measure Score 0       Health Habits and Functional and Cognitive Screenin/20/2024     1:59 PM   Functional & Cognitive Status   Do you have difficulty preparing food and eating? No   Do you have difficulty bathing yourself, getting dressed or grooming yourself? No   Do you have difficulty " using the toilet? No   Do you have difficulty moving around from place to place? No   Do you have trouble with steps or getting out of a bed or a chair? No   Current Diet Well Balanced Diet   Dental Exam Up to date   Eye Exam Up to date   Exercise (times per week) 0 times per week   Current Exercises Include No Regular Exercise   Do you need help using the phone?  No   Are you deaf or do you have serious difficulty hearing?  No   Do you need help to go to places out of walking distance? No   Do you need help shopping? No   Do you need help preparing meals?  No   Do you need help with housework?  No   Do you need help with laundry? No   Do you need help taking your medications? No   Do you need help managing money? No   Do you ever drive or ride in a car without wearing a seat belt? No   Have you felt unusual stress, anger or loneliness in the last month? No   Who do you live with? Alone   If you need help, do you have trouble finding someone available to you? No   Have you been bothered in the last four weeks by sexual problems? No   Do you have difficulty concentrating, remembering or making decisions? No       Age-appropriate Screening Schedule:  Refer to the list below for future screening recommendations based on patient's age, sex and/or medical conditions. Orders for these recommended tests are listed in the plan section. The patient has been provided with a written plan.    Health Maintenance   Topic Date Due    COVID-19 Vaccine (3 - 2023-24 season) 09/01/2023    LIPID PANEL  10/13/2023    RSV Vaccine - Adults (1 - 1-dose 60+ series) 04/24/2025 (Originally 12/28/2000)    TDAP/TD VACCINES (2 - Tdap) 05/20/2025 (Originally 10/7/2020)    ZOSTER VACCINE (1 of 2) 05/20/2025 (Originally 12/28/1990)    INFLUENZA VACCINE  08/01/2024    BMI FOLLOWUP  11/22/2024    DXA SCAN  12/06/2024    ANNUAL WELLNESS VISIT  05/20/2025    Pneumococcal Vaccine 65+  Completed                  CMS Preventative Services Quick  "Reference  Risk Factors Identified During Encounter  None Identified  The above risks/problems have been discussed with the patient.  Pertinent information has been shared with the patient in the After Visit Summary.  An After Visit Summary and PPPS were made available to the patient.    Follow Up:   Next Medicare Wellness visit to be scheduled in 1 year.       Additional E&M Note during same encounter follows:  Patient has multiple medical problems which are significant and separately identifiable that require additional work above and beyond the Medicare Wellness Visit.      Chief Complaint  Medicare Wellness-subsequent    Subjective        --TOLERATING BLOOD PRESSURE MEDICATION WITHOUT APPARENT SIDE EFFECTS  --RECENT NORMAL TSH, CBC AND CMP BUT NO LIPID PANEL WAS DONE, TOLERATING THE STATIN WELL  --CONTINUES ON ROUTINE NARCOTICS, NSAIDS AND REQUIP FOR CHRONIC LOW BACK PAIN, STABLE      Paige E Seem is also being seen today for BLOOD PRESSURE, CHOLESTEROL, PAIN     Review of Systems   Constitutional:  Negative for activity change, appetite change, chills, fatigue and fever.   HENT:  Negative for congestion, ear pain, hearing loss, rhinorrhea and sore throat.    Eyes:  Negative for discharge and visual disturbance.   Respiratory:  Negative for cough and shortness of breath.    Cardiovascular:  Negative for chest pain, palpitations and leg swelling.   Gastrointestinal:  Negative for abdominal pain, diarrhea, nausea and vomiting.   Genitourinary:  Negative for dysuria and hematuria.   Musculoskeletal:  Negative for arthralgias and myalgias.   Psychiatric/Behavioral:  Negative for dysphoric mood.        Objective   Vital Signs:  /74 (BP Location: Left arm, Patient Position: Sitting)   Pulse 68   Ht 158.1 cm (62.25\")   Wt 65 kg (143 lb 3.2 oz)   BMI 25.98 kg/m²     Physical Exam  Vitals and nursing note reviewed.   Constitutional:       General: She is not in acute distress.     Appearance: Normal appearance. "   HENT:      Right Ear: Tympanic membrane normal.      Left Ear: Tympanic membrane normal.      Mouth/Throat:      Pharynx: Oropharynx is clear.   Eyes:      Conjunctiva/sclera: Conjunctivae normal.   Cardiovascular:      Rate and Rhythm: Normal rate and regular rhythm.      Heart sounds: Normal heart sounds. No murmur heard.  Pulmonary:      Effort: Pulmonary effort is normal.      Breath sounds: Normal breath sounds.   Abdominal:      General: Bowel sounds are normal.      Palpations: Abdomen is soft.      Tenderness: There is no abdominal tenderness.   Musculoskeletal:      Cervical back: Neck supple.      Right lower leg: No edema.      Left lower leg: No edema.   Lymphadenopathy:      Cervical: No cervical adenopathy.   Neurological:      General: No focal deficit present.      Mental Status: She is alert.      Cranial Nerves: No cranial nerve deficit.      Coordination: Coordination normal.      Gait: Gait normal.   Psychiatric:         Mood and Affect: Mood normal.         Behavior: Behavior normal.          The following data was reviewed by: Reza Dougherty MD on 05/20/2024:               Assessment and Plan   Diagnoses and all orders for this visit:    1. Medicare annual wellness visit, subsequent (Primary)  Assessment & Plan:  ADVICE GIVEN RE:  SEATBELT USE, ALCOHOL USE, HEALTHY DIET, ROUTINE EYE AND DENTAL EXAM, ROUTINE VACCINATIONS.        2. Essential hypertension  Assessment & Plan:  Hypertension is stable and controlled  Continue current treatment regimen.  Blood pressure will be reassessed in 6 months.    Orders:  -     hydroCHLOROthiazide 25 MG tablet; Take 1 tablet by mouth Daily.  Dispense: 90 tablet; Refill: 3    3. Chronic left-sided low back pain with left-sided sciatica  Assessment & Plan:  STABLE ON CURRENT MEDICATION.  AZRA REVIEWED.  TOX SCREEN IS UP TO DATE.  THE CONTINUED USE OF A CONTROLLED SUBSTANCE IS NECESSARY FOR THIS PATIENT TO HAVE A NEAR NORMAL QUALITY OF LIFE AND WILL  BE REVIEWED AT THE NEXT ROUTINE VISIT.    Orders:  -     HYDROcodone-acetaminophen (NORCO) 5-325 MG per tablet; Take 1 tablet by mouth Daily As Needed for Moderate Pain.  Dispense: 30 tablet; Refill: 0  -     rOPINIRole (REQUIP) 0.5 MG tablet; Take 1 tablet by mouth Every Night. Take 1 hour before bedtime.  Dispense: 90 tablet; Refill: 3    4. High cholesterol  Assessment & Plan:   Lipid abnormalities are stable    Plan:  Continue same medication/s without change.      Discussed medication dosage, use, side effects, and goals of treatment in detail.    Counseled patient on lifestyle modifications to help control hyperlipidemia.     Patient Treatment Goals:   LDL goal is less than 70    Followup in 6 months  UNKNOWN DEGREE OF CONTROL, WILL OBTAIN LABS AND PROCEED ACCORDINGLY .    Orders:  -     atorvastatin (LIPITOR) 20 MG tablet; Take 1 tablet by mouth Daily.  Dispense: 90 tablet; Refill: 3  -     Lipid Panel             Follow Up   Return in about 3 months (around 8/20/2024).  Patient was given instructions and counseling regarding her condition or for health maintenance advice. Please see specific information pulled into the AVS if appropriate.

## 2024-06-17 DIAGNOSIS — G89.29 CHRONIC LEFT-SIDED LOW BACK PAIN WITH LEFT-SIDED SCIATICA: ICD-10-CM

## 2024-06-17 DIAGNOSIS — M54.42 CHRONIC LEFT-SIDED LOW BACK PAIN WITH LEFT-SIDED SCIATICA: ICD-10-CM

## 2024-06-17 NOTE — TELEPHONE ENCOUNTER
Caller: AlondraPaige    Relationship: Self    Best call back number: [phone]     Requested Prescriptions:   Requested Prescriptions     Pending Prescriptions Disp Refills    HYDROcodone-acetaminophen (NORCO) 5-325 MG per tablet 30 tablet 0     Sig: Take 1 tablet by mouth Daily As Needed for Moderate Pain.        Pharmacy where request should be sent: [facility] [id number] - [facility] [place] - [address] [facility] - [phone]  - [phone] FX     Last office visit with prescribing clinician: 5/20/2024   Last telemedicine visit with prescribing clinician: Visit date not found   Next office visit with prescribing clinician: 7/25/2024     Additional details provided by patient:     Does the patient have less than a 3 day supply:  [x] Yes  [] No    Would you like a call back once the refill request has been completed: [] Yes [] No    If the office needs to give you a call back, can they leave a voicemail: [] Yes [] No    Gale Silva, PCT   06/17/24 10:27 EDT

## 2024-06-17 NOTE — TELEPHONE ENCOUNTER
Rx Refill Note  Requested Prescriptions     Pending Prescriptions Disp Refills    HYDROcodone-acetaminophen (NORCO) 5-325 MG per tablet 30 tablet 0     Sig: Take 1 tablet by mouth Daily As Needed for Moderate Pain.      Last office visit with prescribing clinician: 5/20/2024   Last telemedicine visit with prescribing clinician: Visit date not found   Next office visit with prescribing clinician: 7/25/2024       Nathalie Sales LPN  06/17/24, 11:19 EDT    LF-5/20/24 #30, RF-0  UDS-POC Medline 12 Panel Urine Drug Screen (04/24/2024 15:13)

## 2024-06-18 RX ORDER — HYDROCODONE BITARTRATE AND ACETAMINOPHEN 5; 325 MG/1; MG/1
1 TABLET ORAL DAILY PRN
Qty: 30 TABLET | Refills: 0 | Status: SHIPPED | OUTPATIENT
Start: 2024-06-18

## 2024-07-15 DIAGNOSIS — M54.42 CHRONIC LEFT-SIDED LOW BACK PAIN WITH LEFT-SIDED SCIATICA: ICD-10-CM

## 2024-07-15 DIAGNOSIS — G89.29 CHRONIC LEFT-SIDED LOW BACK PAIN WITH LEFT-SIDED SCIATICA: ICD-10-CM

## 2024-07-15 NOTE — TELEPHONE ENCOUNTER
Caller: Paige Cantu    Relationship: Self    Best call back number: [phone]    Requested Prescriptions:   Requested Prescriptions     Pending Prescriptions Disp Refills    HYDROcodone-acetaminophen (NORCO) 5-325 MG per tablet 30 tablet 0     Sig: Take 1 tablet by mouth Daily As Needed for Moderate Pain.        Pharmacy where request should be sent: [facility] [id number] - [facility] [place] - [address] [facility] - [phone]  - [phone] FX     Last office visit with prescribing clinician: 5/20/2024   Last telemedicine visit with prescribing clinician: Visit date not found   Next office visit with prescribing clinician: 7/25/2024     Does the patient have less than a 3 day supply:  [x] Yes  [] No    Would you like a call back once the refill request has been completed: [] Yes [] No    If the office needs to give you a call back, can they leave a voicemail: [] Yes [] No    Vianey Meyers   07/15/24 13:55 EDT

## 2024-07-16 RX ORDER — HYDROCODONE BITARTRATE AND ACETAMINOPHEN 5; 325 MG/1; MG/1
1 TABLET ORAL DAILY PRN
Qty: 30 TABLET | Refills: 0 | Status: SHIPPED | OUTPATIENT
Start: 2024-07-16

## 2024-07-25 ENCOUNTER — OFFICE VISIT (OUTPATIENT)
Dept: FAMILY MEDICINE CLINIC | Age: 84
End: 2024-07-25
Payer: MEDICARE

## 2024-07-25 VITALS
TEMPERATURE: 98.8 F | SYSTOLIC BLOOD PRESSURE: 161 MMHG | BODY MASS INDEX: 25.98 KG/M2 | WEIGHT: 141.2 LBS | HEIGHT: 62 IN | DIASTOLIC BLOOD PRESSURE: 71 MMHG | HEART RATE: 76 BPM

## 2024-07-25 DIAGNOSIS — G25.81 RLS (RESTLESS LEGS SYNDROME): ICD-10-CM

## 2024-07-25 DIAGNOSIS — G89.29 CHRONIC LEFT-SIDED LOW BACK PAIN WITH LEFT-SIDED SCIATICA: Primary | ICD-10-CM

## 2024-07-25 DIAGNOSIS — M54.42 CHRONIC LEFT-SIDED LOW BACK PAIN WITH LEFT-SIDED SCIATICA: Primary | ICD-10-CM

## 2024-07-25 PROBLEM — Z00.00 MEDICARE ANNUAL WELLNESS VISIT, SUBSEQUENT: Status: RESOLVED | Noted: 2023-04-07 | Resolved: 2024-07-25

## 2024-07-25 PROCEDURE — 1159F MED LIST DOCD IN RCRD: CPT | Performed by: FAMILY MEDICINE

## 2024-07-25 PROCEDURE — 3078F DIAST BP <80 MM HG: CPT | Performed by: FAMILY MEDICINE

## 2024-07-25 PROCEDURE — 99213 OFFICE O/P EST LOW 20 MIN: CPT | Performed by: FAMILY MEDICINE

## 2024-07-25 PROCEDURE — 1126F AMNT PAIN NOTED NONE PRSNT: CPT | Performed by: FAMILY MEDICINE

## 2024-07-25 PROCEDURE — 1160F RVW MEDS BY RX/DR IN RCRD: CPT | Performed by: FAMILY MEDICINE

## 2024-07-25 PROCEDURE — 3077F SYST BP >= 140 MM HG: CPT | Performed by: FAMILY MEDICINE

## 2024-07-25 NOTE — PROGRESS NOTES
Chief Complaint  Hypertension (3 months)    Subjective          Paige Cantu presents to [facility]  History of Present Illness  --CHRONIC MID LOW BACK PAIN WITH LEFT SIDED SCIATICA AND SUBSEQUENT RLS.  THIS IS DUE TO AN INJURY AT WORK SEVERAL YEARS AGO.  HAS BEEN THROUGH P.T. WITH LITTLE BENEFIT.  CONTINUES ON ROUTINE LOW DOSE NARCOTICS AND REQUIP.  STABLE         No Known Allergies     Health Maintenance Due   Topic Date Due    COVID-19 Vaccine (3 - 2023-24 season) 09/01/2023        Current Outpatient Medications on File Prior to Visit   Medication Sig    anastrozole (ARIMIDEX) 1 MG tablet     atorvastatin (LIPITOR) 20 MG tablet Take 1 tablet by mouth Daily.    hydroCHLOROthiazide 25 MG tablet Take 1 tablet by mouth Daily.    HYDROcodone-acetaminophen (NORCO) 5-325 MG per tablet Take 1 tablet by mouth Daily As Needed for Moderate Pain.    lisinopril (PRINIVIL,ZESTRIL) 10 MG tablet Take 1 tablet by mouth Daily.    meloxicam (MOBIC) 15 MG tablet Take 1 tablet by mouth Daily.    rOPINIRole (REQUIP) 0.5 MG tablet Take 1 tablet by mouth Every Night. Take 1 hour before bedtime.     No current facility-administered medications on file prior to visit.       Immunization History   Administered Date(s) Administered    COVID-19 (MODERNA) 1st,2nd,3rd Dose Monovalent 01/26/2021, 02/23/2021    Fluzone High Dose =>65 Years ([employer] ONLY) 01/29/2018, 12/28/2018    Fluzone High-Dose 65+yrs 01/05/2022, 12/08/2022, 10/23/2023    Influenza TIV (IM) 10/26/2012    Influenza, Unspecified 09/30/2020    Pneumococcal Conjugate 13-Valent (PCV13) 08/11/2015    Pneumococcal Polysaccharide (PPSV23) 01/01/2006    Td (TDVAX) 10/07/2010       Review of Systems   Constitutional:  Negative for activity change, appetite change, chills, fatigue and fever.   HENT:  Negative for congestion, ear pain, rhinorrhea and sore throat.    Respiratory:  Negative for cough and shortness of breath.    Cardiovascular:  Negative  "for chest pain, palpitations and leg swelling.   Gastrointestinal:  Negative for abdominal pain, constipation, diarrhea, nausea and vomiting.   Musculoskeletal:  Negative for arthralgias and myalgias.   Neurological:  Negative for headache.        Objective     /71 (BP Location: Left arm, Patient Position: Sitting)   Pulse 76   Temp 98.8 °F (37.1 °C) (Oral)   Ht 158.1 cm (62.25\")   Wt 64 kg (141 lb 3.2 oz)   BMI 25.62 kg/m²       Physical Exam  Vitals and nursing note reviewed.   Constitutional:       General: She is not in acute distress.     Appearance: Normal appearance.   Cardiovascular:      Rate and Rhythm: Normal rate and regular rhythm.      Heart sounds: Normal heart sounds. No murmur heard.  Pulmonary:      Effort: Pulmonary effort is normal.      Breath sounds: Normal breath sounds.   Abdominal:      Palpations: Abdomen is soft.      Tenderness: There is no abdominal tenderness.   Musculoskeletal:      Cervical back: Neck supple.      Right lower leg: No edema.      Left lower leg: No edema.      Comments: LOW BACK WITH LIMITED ROM, POSITIVE LEFT SLR    Lymphadenopathy:      Cervical: No cervical adenopathy.   Neurological:      General: No focal deficit present.      Mental Status: She is alert.      Cranial Nerves: No cranial nerve deficit.      Coordination: Coordination normal.      Gait: Gait normal.   Psychiatric:         Mood and Affect: Mood normal.         Behavior: Behavior normal.         Result Review :                             Assessment and Plan      Diagnoses and all orders for this visit:    1. 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 RLS (restless legs syndrome)  Assessment & Plan:  IMPROVED WITH MEDS BUT NOT AT GOAL.  WILL REASSESS AT NEXT VISIT               Follow Up     Return in about 3 months (around 10/25/2024).    Patient was " given instructions and counseling regarding her condition or for health maintenance advice. Please see specific information pulled into the AVS if appropriate.

## 2024-08-13 DIAGNOSIS — M54.42 CHRONIC LEFT-SIDED LOW BACK PAIN WITH LEFT-SIDED SCIATICA: ICD-10-CM

## 2024-08-13 DIAGNOSIS — G89.29 CHRONIC LEFT-SIDED LOW BACK PAIN WITH LEFT-SIDED SCIATICA: ICD-10-CM

## 2024-08-13 RX ORDER — HYDROCODONE BITARTRATE AND ACETAMINOPHEN 5; 325 MG/1; MG/1
1 TABLET ORAL DAILY PRN
Qty: 30 TABLET | Refills: 0 | Status: SHIPPED | OUTPATIENT
Start: 2024-08-13

## 2024-08-13 NOTE — TELEPHONE ENCOUNTER
Last refill sent: 07/16/24, #30  POC Medline 12 Panel Urine Drug Screen (04/24/2024 15:13)   Dr Dougherty is out of the office until 08/21/24, sent to on call Dr Garg.

## 2024-08-13 NOTE — TELEPHONE ENCOUNTER
Caller: Paige Cantu    Relationship: Self    Best call back number: [phone]    Requested Prescriptions:   Requested Prescriptions     Pending Prescriptions Disp Refills    HYDROcodone-acetaminophen (NORCO) 5-325 MG per tablet 30 tablet 0     Sig: Take 1 tablet by mouth Daily As Needed for Moderate Pain.        Pharmacy where request should be sent: [facility] [id number] - [facility] [place] - [address] [facility] - [phone]  - [phone] FX     Last office visit with prescribing clinician: 7/25/2024   Last telemedicine visit with prescribing clinician: Visit date not found   Next office visit with prescribing clinician: 10/28/2024       Does the patient have less than a 3 day supply:  [x] Yes  [] No    Would you like a call back once the refill request has been completed: [] Yes [x] No    If the office needs to give you a call back, can they leave a voicemail: [] Yes [x] No    Vianey Chan   08/13/24 14:46 EDT

## 2024-08-26 DIAGNOSIS — I10 ESSENTIAL HYPERTENSION: ICD-10-CM

## 2024-08-26 RX ORDER — LISINOPRIL 10 MG/1
10 TABLET ORAL DAILY
Qty: 90 TABLET | Refills: 3 | Status: SHIPPED | OUTPATIENT
Start: 2024-08-26

## 2024-08-26 NOTE — TELEPHONE ENCOUNTER
Failed protocol    LOV  7/25/24    LF  6/1/24  #90        COMPREHENSIVE METABOLIC PANEL (03/11/2024 13:49)

## 2024-09-11 DIAGNOSIS — M54.42 CHRONIC LEFT-SIDED LOW BACK PAIN WITH LEFT-SIDED SCIATICA: ICD-10-CM

## 2024-09-11 DIAGNOSIS — G89.29 CHRONIC LEFT-SIDED LOW BACK PAIN WITH LEFT-SIDED SCIATICA: ICD-10-CM

## 2024-09-11 NOTE — TELEPHONE ENCOUNTER
Caller: Paige Cantu    Relationship: Self    Best call back number: [phone]     Requested Prescriptions:   Requested Prescriptions     Pending Prescriptions Disp Refills    HYDROcodone-acetaminophen (NORCO) 5-325 MG per tablet 30 tablet 0     Sig: Take 1 tablet by mouth Daily As Needed for Moderate Pain.        Pharmacy where request should be sent: [facility] [id number] - [place], [place] - [address] [facility] - [phone]  - [phone] FX     Last office visit with prescribing clinician: 7/25/2024   Last telemedicine visit with prescribing clinician: Visit date not found   Next office visit with prescribing clinician: 10/28/2024     Additional details provided by patient:     Does the patient have less than a 3 day supply:  [x] Yes  [] No    Would you like a call back once the refill request has been completed: [] Yes [x] No    If the office needs to give you a call back, can they leave a voicemail: [x] Yes [] No    Vianey Wahl Rep   09/11/24 12:29 EDT

## 2024-09-11 NOTE — TELEPHONE ENCOUNTER
Last refill sent by on call Dr Garg 08/13/24, #30  POC Medline 12 Panel Urine Drug Screen (04/24/2024 15:13)

## 2024-09-12 RX ORDER — HYDROCODONE BITARTRATE AND ACETAMINOPHEN 5; 325 MG/1; MG/1
1 TABLET ORAL DAILY PRN
Qty: 30 TABLET | Refills: 0 | Status: SHIPPED | OUTPATIENT
Start: 2024-09-12

## 2024-09-17 ENCOUNTER — TELEPHONE (OUTPATIENT)
Dept: FAMILY MEDICINE CLINIC | Age: 84
End: 2024-09-17
Payer: MEDICARE

## 2024-10-10 DIAGNOSIS — G89.29 CHRONIC LEFT-SIDED LOW BACK PAIN WITH LEFT-SIDED SCIATICA: ICD-10-CM

## 2024-10-10 DIAGNOSIS — M54.42 CHRONIC LEFT-SIDED LOW BACK PAIN WITH LEFT-SIDED SCIATICA: ICD-10-CM

## 2024-10-10 NOTE — TELEPHONE ENCOUNTER
Caller: Paige Cantu    Relationship: Self    Best call back number: [phone]     Requested Prescriptions:   Requested Prescriptions     Pending Prescriptions Disp Refills    HYDROcodone-acetaminophen (NORCO) 5-325 MG per tablet 30 tablet 0     Sig: Take 1 tablet by mouth Daily As Needed for Moderate Pain.        Pharmacy where request should be sent: [facility] [id number] - [facility] [place] - [address] [facility] - [phone]  - [phone] FX     Last office visit with prescribing clinician: 7/25/2024   Last telemedicine visit with prescribing clinician: Visit date not found   Next office visit with prescribing clinician: 10/28/2024     Additional details provided by patient: PATIENT HAS 2 DAYS LEFT OF MEDICATION     Does the patient have less than a 3 day supply:  [x] Yes  [] No      Vianey Ji Rep   10/10/24 14:22 EDT

## 2024-10-11 NOTE — TELEPHONE ENCOUNTER
Caller: Paige Cantu    Relationship to patient: Self    Best call back number: [phone]     Patient is needing: PATIENT WAS CALLING FOR A STATUS UPDATE SHE WILL BE OUT OF MEDICATION IF NOT CALLED IN BEFORE THE WEEKEND.    PLEASE CONTACT PATIENT TO ADVISE.         Is it okay if the provider responds through MyChart: NO, CALL PREFERRED MAY LEAVE VOICEMAIL.

## 2024-10-13 RX ORDER — HYDROCODONE BITARTRATE AND ACETAMINOPHEN 5; 325 MG/1; MG/1
1 TABLET ORAL DAILY PRN
Qty: 30 TABLET | Refills: 0 | Status: SHIPPED | OUTPATIENT
Start: 2024-10-13

## 2024-10-28 ENCOUNTER — LAB (OUTPATIENT)
Dept: LAB | Facility: HOSPITAL | Age: 84
End: 2024-10-28
Payer: MEDICARE

## 2024-10-28 ENCOUNTER — OFFICE VISIT (OUTPATIENT)
Dept: FAMILY MEDICINE CLINIC | Age: 84
End: 2024-10-28
Payer: MEDICARE

## 2024-10-28 VITALS
BODY MASS INDEX: 25.47 KG/M2 | WEIGHT: 138.4 LBS | HEART RATE: 84 BPM | DIASTOLIC BLOOD PRESSURE: 77 MMHG | TEMPERATURE: 98.1 F | SYSTOLIC BLOOD PRESSURE: 172 MMHG | HEIGHT: 62 IN

## 2024-10-28 DIAGNOSIS — Z23 NEED FOR IMMUNIZATION AGAINST INFLUENZA: ICD-10-CM

## 2024-10-28 DIAGNOSIS — G89.29 CHRONIC LEFT-SIDED LOW BACK PAIN WITH LEFT-SIDED SCIATICA: ICD-10-CM

## 2024-10-28 DIAGNOSIS — I10 ESSENTIAL HYPERTENSION: Primary | ICD-10-CM

## 2024-10-28 DIAGNOSIS — M54.42 CHRONIC LEFT-SIDED LOW BACK PAIN WITH LEFT-SIDED SCIATICA: ICD-10-CM

## 2024-10-28 DIAGNOSIS — E78.00 HIGH CHOLESTEROL: ICD-10-CM

## 2024-10-28 PROCEDURE — 80061 LIPID PANEL: CPT | Performed by: FAMILY MEDICINE

## 2024-10-28 PROCEDURE — 3078F DIAST BP <80 MM HG: CPT | Performed by: FAMILY MEDICINE

## 2024-10-28 PROCEDURE — 1160F RVW MEDS BY RX/DR IN RCRD: CPT | Performed by: FAMILY MEDICINE

## 2024-10-28 PROCEDURE — 99214 OFFICE O/P EST MOD 30 MIN: CPT | Performed by: FAMILY MEDICINE

## 2024-10-28 PROCEDURE — 1126F AMNT PAIN NOTED NONE PRSNT: CPT | Performed by: FAMILY MEDICINE

## 2024-10-28 PROCEDURE — G0008 ADMIN INFLUENZA VIRUS VAC: HCPCS | Performed by: FAMILY MEDICINE

## 2024-10-28 PROCEDURE — 84443 ASSAY THYROID STIM HORMONE: CPT | Performed by: FAMILY MEDICINE

## 2024-10-28 PROCEDURE — 3077F SYST BP >= 140 MM HG: CPT | Performed by: FAMILY MEDICINE

## 2024-10-28 PROCEDURE — 1159F MED LIST DOCD IN RCRD: CPT | Performed by: FAMILY MEDICINE

## 2024-10-28 PROCEDURE — 36415 COLL VENOUS BLD VENIPUNCTURE: CPT | Performed by: FAMILY MEDICINE

## 2024-10-28 PROCEDURE — 90662 IIV NO PRSV INCREASED AG IM: CPT | Performed by: FAMILY MEDICINE

## 2024-10-28 PROCEDURE — 85027 COMPLETE CBC AUTOMATED: CPT | Performed by: FAMILY MEDICINE

## 2024-10-28 PROCEDURE — 80053 COMPREHEN METABOLIC PANEL: CPT | Performed by: FAMILY MEDICINE

## 2024-10-28 NOTE — PROGRESS NOTES
Chief Complaint  Back Pain (Chronic 3 month follow up)    Subjective          Paige Cantu presents to [facility]  History of Present Illness  --TOLERATING BLOOD PRESSURE MEDICATION WITHOUT APPARENT SIDE EFFECTS, BP IS HIGHER TODAY THAN AT LAST VISIT  --LAST LIPIDS WERE OK, NO ISSUES WITH THE STATIN  --CONTINUES ON ROUTINE LOW DOSE NARCOTICS FOR CHRONIC LOW BACK PAIN, STABLE        No Known Allergies     Health Maintenance Due   Topic Date Due    DXA SCAN  12/06/2024        Current Outpatient Medications on File Prior to Visit   Medication Sig    anastrozole (ARIMIDEX) 1 MG tablet     atorvastatin (LIPITOR) 20 MG tablet Take 1 tablet by mouth Daily.    hydroCHLOROthiazide 25 MG tablet Take 1 tablet by mouth Daily.    HYDROcodone-acetaminophen (NORCO) 5-325 MG per tablet Take 1 tablet by mouth Daily As Needed for Moderate Pain.    lisinopril (PRINIVIL,ZESTRIL) 10 MG tablet Take 1 tablet by mouth Daily.    meloxicam (MOBIC) 15 MG tablet Take 1 tablet by mouth Daily.    rOPINIRole (REQUIP) 0.5 MG tablet Take 1 tablet by mouth Every Night. Take 1 hour before bedtime.     No current facility-administered medications on file prior to visit.       Immunization History   Administered Date(s) Administered    COVID-19 (MODERNA) 1st,2nd,3rd Dose Monovalent 01/26/2021, 02/23/2021    Fluzone High-Dose 65+YRS 01/29/2018, 12/28/2018, 10/28/2024    Fluzone High-Dose 65+yrs 01/05/2022, 12/08/2022, 10/23/2023    Influenza TIV (IM) 10/26/2012    Influenza, Unspecified 09/30/2020    Pneumococcal Conjugate 13-Valent (PCV13) 08/11/2015    Pneumococcal Polysaccharide (PPSV23) 01/01/2006    Td (TDVAX) 10/07/2010       Review of Systems   Constitutional:  Negative for activity change, appetite change, chills, fatigue and fever.   HENT:  Negative for congestion, ear pain, rhinorrhea and sore throat.    Respiratory:  Negative for cough and shortness of breath.    Cardiovascular:  Negative for chest pain,  "palpitations and leg swelling.   Gastrointestinal:  Negative for abdominal pain, constipation, diarrhea, nausea and vomiting.   Musculoskeletal:  Negative for arthralgias and myalgias.   Neurological:  Negative for headache.        Objective     /77 (BP Location: Left arm, Patient Position: Sitting)   Pulse 84   Temp 98.1 °F (36.7 °C) (Oral)   Ht 158.1 cm (62.25\")   Wt 62.8 kg (138 lb 6.4 oz)   BMI 25.11 kg/m²       Physical Exam  Vitals and nursing note reviewed.   Constitutional:       General: She is not in acute distress.     Appearance: Normal appearance.   Cardiovascular:      Rate and Rhythm: Normal rate and regular rhythm.      Heart sounds: Normal heart sounds. No murmur heard.  Pulmonary:      Effort: Pulmonary effort is normal.      Breath sounds: Normal breath sounds.   Abdominal:      Palpations: Abdomen is soft.      Tenderness: There is no abdominal tenderness.   Musculoskeletal:      Cervical back: Neck supple.      Right lower leg: No edema.      Left lower leg: No edema.   Lymphadenopathy:      Cervical: No cervical adenopathy.   Neurological:      General: No focal deficit present.      Mental Status: She is alert.      Cranial Nerves: No cranial nerve deficit.      Coordination: Coordination normal.      Gait: Gait normal.   Psychiatric:         Mood and Affect: Mood normal.         Behavior: Behavior normal.         Result Review :                             Assessment and Plan      Diagnoses and all orders for this visit:    1. Essential hypertension (Primary)  Assessment & Plan:  Hypertension is stable and controlled  Continue current treatment regimen.  Blood pressure will be reassessed in 6 months  NOT AT GOAL TODAY, WILL REASSESS AT NEXT VISIT .    Orders:  -     CBC (No Diff)  -     Comprehensive Metabolic Panel  -     TSH Rfx On Abnormal To Free T4    2. Need for immunization against influenza  -     Fluzone High-Dose 65+yrs (1826-4368)    3. Chronic left-sided low back pain " with left-sided sciatica  Assessment & Plan:  STABLE ON CURRENT MEDICATION.  AZRA REVIEWED.  TOX SCREEN IS UP TO DATE.  THE CONTINUED USE OF A CONTROLLED SUBSTANCE IS NECESSARY FOR THIS PATIENT TO HAVE A NEAR NORMAL QUALITY OF LIFE AND WILL BE REVIEWED AT THE NEXT ROUTINE VISIT.      4. High cholesterol  Assessment & Plan:   Lipid abnormalities are stable    Plan:  Continue same medication/s without change.      Discussed medication dosage, use, side effects, and goals of treatment in detail.    Counseled patient on lifestyle modifications to help control hyperlipidemia.     Patient Treatment Goals:   LDL goal is less than 70    Followup in 6 months.    Orders:  -     Lipid Panel                    Follow Up     Return in about 3 months (around 1/28/2025).    Patient was given instructions and counseling regarding her condition or for health maintenance advice. Please see specific information pulled into the AVS if appropriate.

## 2024-10-28 NOTE — ASSESSMENT & PLAN NOTE
Hypertension is stable and controlled  Continue current treatment regimen.  Blood pressure will be reassessed in 6 months  NOT AT GOAL TODAY, WILL REASSESS AT NEXT VISIT .

## 2024-10-29 LAB
ALBUMIN SERPL-MCNC: 3.8 G/DL (ref 3.5–5.2)
ALBUMIN/GLOB SERPL: 1.4 G/DL
ALP SERPL-CCNC: 86 U/L (ref 39–117)
ALT SERPL W P-5'-P-CCNC: 7 U/L (ref 1–33)
ANION GAP SERPL CALCULATED.3IONS-SCNC: 5.4 MMOL/L (ref 5–15)
AST SERPL-CCNC: 16 U/L (ref 1–32)
BILIRUB SERPL-MCNC: 0.4 MG/DL (ref 0–1.2)
BUN SERPL-MCNC: 19 MG/DL (ref 8–23)
BUN/CREAT SERPL: 15.3 (ref 7–25)
CALCIUM SPEC-SCNC: 9.2 MG/DL (ref 8.6–10.5)
CHLORIDE SERPL-SCNC: 106 MMOL/L (ref 98–107)
CHOLEST SERPL-MCNC: 183 MG/DL (ref 0–200)
CO2 SERPL-SCNC: 25.6 MMOL/L (ref 22–29)
CREAT SERPL-MCNC: 1.24 MG/DL (ref 0.57–1)
DEPRECATED RDW RBC AUTO: 44.2 FL (ref 37–54)
EGFRCR SERPLBLD CKD-EPI 2021: 43.3 ML/MIN/1.73
ERYTHROCYTE [DISTWIDTH] IN BLOOD BY AUTOMATED COUNT: 12.7 % (ref 12.3–15.4)
GLOBULIN UR ELPH-MCNC: 2.7 GM/DL
GLUCOSE SERPL-MCNC: 92 MG/DL (ref 65–99)
HCT VFR BLD AUTO: 32.9 % (ref 34–46.6)
HDLC SERPL-MCNC: 53 MG/DL (ref 40–60)
HGB BLD-MCNC: 11 G/DL (ref 12–15.9)
LDLC SERPL CALC-MCNC: 98 MG/DL (ref 0–100)
LDLC/HDLC SERPL: 1.74 {RATIO}
MCH RBC QN AUTO: 32.1 PG (ref 26.6–33)
MCHC RBC AUTO-ENTMCNC: 33.4 G/DL (ref 31.5–35.7)
MCV RBC AUTO: 95.9 FL (ref 79–97)
PLATELET # BLD AUTO: 251 10*3/MM3 (ref 140–450)
PMV BLD AUTO: 10.2 FL (ref 6–12)
POTASSIUM SERPL-SCNC: 4.1 MMOL/L (ref 3.5–5.2)
PROT SERPL-MCNC: 6.5 G/DL (ref 6–8.5)
RBC # BLD AUTO: 3.43 10*6/MM3 (ref 3.77–5.28)
SODIUM SERPL-SCNC: 137 MMOL/L (ref 136–145)
TRIGL SERPL-MCNC: 188 MG/DL (ref 0–150)
TSH SERPL DL<=0.05 MIU/L-ACNC: 0.99 UIU/ML (ref 0.27–4.2)
VLDLC SERPL-MCNC: 32 MG/DL (ref 5–40)
WBC NRBC COR # BLD AUTO: 7.77 10*3/MM3 (ref 3.4–10.8)

## 2024-11-11 DIAGNOSIS — G89.29 CHRONIC LEFT-SIDED LOW BACK PAIN WITH LEFT-SIDED SCIATICA: ICD-10-CM

## 2024-11-11 DIAGNOSIS — M54.42 CHRONIC LEFT-SIDED LOW BACK PAIN WITH LEFT-SIDED SCIATICA: ICD-10-CM

## 2024-11-11 NOTE — TELEPHONE ENCOUNTER
Caller: Paige Cantu    Relationship: Self    Best call back number: [phone]    Requested Prescriptions:   Requested Prescriptions     Pending Prescriptions Disp Refills    HYDROcodone-acetaminophen (NORCO) 5-325 MG per tablet 30 tablet 0     Sig: Take 1 tablet by mouth Daily As Needed for Moderate Pain.        Pharmacy where request should be sent: [facility] [id number] - [facility] [place] - [address] [facility] - [phone]  - [phone] FX     Last office visit with prescribing clinician: 10/28/2024   Last telemedicine visit with prescribing clinician: Visit date not found   Next office visit with prescribing clinician: 1/31/2025     Does the patient have less than a 3 day supply:  [x] Yes  [] No    Would you like a call back once the refill request has been completed: [] Yes [x] No    If the office needs to give you a call back, can they leave a voicemail: [] Yes [x] No    Vianey Chan Rep   11/11/24 14:50 EST

## 2024-11-12 RX ORDER — HYDROCODONE BITARTRATE AND ACETAMINOPHEN 5; 325 MG/1; MG/1
1 TABLET ORAL DAILY PRN
Qty: 30 TABLET | Refills: 0 | Status: SHIPPED | OUTPATIENT
Start: 2024-11-12

## 2024-11-12 NOTE — TELEPHONE ENCOUNTER
LOV  10/28/24    LF  10/13/24  #30    POC Medline 12 Panel Urine Drug Screen (04/24/2024 15:13)    Sending to on call Dr Mary Dougherty out of office

## 2024-12-11 DIAGNOSIS — M54.42 CHRONIC LEFT-SIDED LOW BACK PAIN WITH LEFT-SIDED SCIATICA: ICD-10-CM

## 2024-12-11 DIAGNOSIS — G89.29 CHRONIC LEFT-SIDED LOW BACK PAIN WITH LEFT-SIDED SCIATICA: ICD-10-CM

## 2024-12-11 NOTE — TELEPHONE ENCOUNTER
Caller: Paige Cantu    Relationship: Self    Best call back number: [phone]     Requested Prescriptions:   Requested Prescriptions     Pending Prescriptions Disp Refills    HYDROcodone-acetaminophen (NORCO) 5-325 MG per tablet 30 tablet 0     Sig: Take 1 tablet by mouth Daily As Needed for Moderate Pain.        Pharmacy where request should be sent: [facility] [id number] - [place] [facility] [place] - [address] [facility] - [phone]  - [phone] FX     Last office visit with prescribing clinician: 10/28/2024   Last telemedicine visit with prescribing clinician: Visit date not found   Next office visit with prescribing clinician: 1/31/2025     Additional details provided by patient: PATIENT HAS1 PILL LEFT    Does the patient have less than a 3 day supply:  [x] Yes  [] No      Vianey Ji Rep   12/11/24 13:44 EST

## 2024-12-12 RX ORDER — HYDROCODONE BITARTRATE AND ACETAMINOPHEN 5; 325 MG/1; MG/1
1 TABLET ORAL DAILY PRN
Qty: 30 TABLET | Refills: 0 | Status: SHIPPED | OUTPATIENT
Start: 2024-12-12

## 2024-12-23 ENCOUNTER — TELEPHONE (OUTPATIENT)
Dept: FAMILY MEDICINE CLINIC | Age: 84
End: 2024-12-23
Payer: MEDICARE

## 2024-12-23 NOTE — TELEPHONE ENCOUNTER
Left a message that her controlled med hydrocodone always goes to the local [facility]. It was sent on 12/12/24.

## 2024-12-23 NOTE — TELEPHONE ENCOUNTER
Pt called the answering service and stated that she was needing more information for her pharmacy in order to refill a medication. She said she uses [facility].

## 2025-01-28 DIAGNOSIS — G89.29 CHRONIC LEFT-SIDED LOW BACK PAIN WITH LEFT-SIDED SCIATICA: ICD-10-CM

## 2025-01-28 DIAGNOSIS — M54.42 CHRONIC LEFT-SIDED LOW BACK PAIN WITH LEFT-SIDED SCIATICA: ICD-10-CM

## 2025-01-28 NOTE — TELEPHONE ENCOUNTER
Rx Refill Note  Requested Prescriptions     Pending Prescriptions Disp Refills    HYDROcodone-acetaminophen (NORCO) 5-325 MG per tablet 30 tablet 0     Sig: Take 1 tablet by mouth Daily As Needed for Moderate Pain.      Last office visit with prescribing clinician: 10/28/2024     Next office visit with prescribing clinician: 1/31/2025    HYDROcodone-acetaminophen (NORCO) 5-325 MG per tablet (12/12/2024)     POC Medline 12 Panel Urine Drug Screen (04/24/2024 15:13)     Selma Suarez LPN  01/28/25, 13:54 EST

## 2025-01-28 NOTE — TELEPHONE ENCOUNTER
Caller: Paige Catnu    Relationship: Self    Best call back number: [phone]    Requested Prescriptions:   Requested Prescriptions     Pending Prescriptions Disp Refills    HYDROcodone-acetaminophen (NORCO) 5-325 MG per tablet 30 tablet 0     Sig: Take 1 tablet by mouth Daily As Needed for Moderate Pain.        Pharmacy where request should be sent: [facility] [id number] - [facility] [place] - [address] [facility] - [phone]  - [phone] FX     Last office visit with prescribing clinician: 10/28/2024   Last telemedicine visit with prescribing clinician: Visit date not found   Next office visit with prescribing clinician: 1/31/2025     Additional details provided by patient:     Does the patient have less than a 3 day supply:  [x] Yes  [] No        Vianey Leyva Rep   01/28/25 13:35 EST

## 2025-01-29 RX ORDER — HYDROCODONE BITARTRATE AND ACETAMINOPHEN 5; 325 MG/1; MG/1
1 TABLET ORAL DAILY PRN
Qty: 30 TABLET | Refills: 0 | Status: SHIPPED | OUTPATIENT
Start: 2025-01-29

## 2025-01-31 ENCOUNTER — OFFICE VISIT (OUTPATIENT)
Dept: FAMILY MEDICINE CLINIC | Age: 85
End: 2025-01-31
Payer: MEDICARE

## 2025-01-31 VITALS
SYSTOLIC BLOOD PRESSURE: 137 MMHG | WEIGHT: 142.6 LBS | TEMPERATURE: 98.5 F | HEIGHT: 62 IN | DIASTOLIC BLOOD PRESSURE: 75 MMHG | BODY MASS INDEX: 26.24 KG/M2 | OXYGEN SATURATION: 100 % | HEART RATE: 87 BPM

## 2025-01-31 DIAGNOSIS — M54.42 CHRONIC LEFT-SIDED LOW BACK PAIN WITH LEFT-SIDED SCIATICA: Primary | ICD-10-CM

## 2025-01-31 DIAGNOSIS — G89.29 CHRONIC LEFT-SIDED LOW BACK PAIN WITH LEFT-SIDED SCIATICA: Primary | ICD-10-CM

## 2025-01-31 DIAGNOSIS — G25.81 RLS (RESTLESS LEGS SYNDROME): ICD-10-CM

## 2025-01-31 PROCEDURE — 1159F MED LIST DOCD IN RCRD: CPT | Performed by: FAMILY MEDICINE

## 2025-01-31 PROCEDURE — 1160F RVW MEDS BY RX/DR IN RCRD: CPT | Performed by: FAMILY MEDICINE

## 2025-01-31 PROCEDURE — 99213 OFFICE O/P EST LOW 20 MIN: CPT | Performed by: FAMILY MEDICINE

## 2025-01-31 PROCEDURE — G2211 COMPLEX E/M VISIT ADD ON: HCPCS | Performed by: FAMILY MEDICINE

## 2025-01-31 PROCEDURE — 1126F AMNT PAIN NOTED NONE PRSNT: CPT | Performed by: FAMILY MEDICINE

## 2025-01-31 PROCEDURE — 3078F DIAST BP <80 MM HG: CPT | Performed by: FAMILY MEDICINE

## 2025-01-31 PROCEDURE — 3075F SYST BP GE 130 - 139MM HG: CPT | Performed by: FAMILY MEDICINE

## 2025-01-31 NOTE — PROGRESS NOTES
Chief Complaint  Chronic low back pain    Subjective          Paige Cantu presents to [facility]  History of Present Illness  --CHRONIC MID LOW BACK PAIN WITH LEFT SIDED SCIATICA AND SUBSEQUENT RLS.  THIS IS RELATED TO AN INJURY AT WORK SEVERAL YEARS AGO.  SHE IS S/P IMAGING AND P.T. WITH LITTLE CHANGE IN SYMPTOMS.  CONTINUES ON ROUTINE LOW DOSE NARCOTICS AND REQUIP.  STABLE.          No Known Allergies     Health Maintenance Due   Topic Date Due    COVID-19 Vaccine (3 - 2024-25 season) 09/01/2024    BMI FOLLOWUP  11/22/2024    DXA SCAN  12/06/2024        Current Outpatient Medications on File Prior to Visit   Medication Sig    anastrozole (ARIMIDEX) 1 MG tablet     atorvastatin (LIPITOR) 20 MG tablet Take 1 tablet by mouth Daily.    hydroCHLOROthiazide 25 MG tablet Take 1 tablet by mouth Daily.    HYDROcodone-acetaminophen (NORCO) 5-325 MG per tablet Take 1 tablet by mouth Daily As Needed for Moderate Pain.    lisinopril (PRINIVIL,ZESTRIL) 10 MG tablet Take 1 tablet by mouth Daily.    meloxicam (MOBIC) 15 MG tablet Take 1 tablet by mouth Daily.    rOPINIRole (REQUIP) 0.5 MG tablet Take 1 tablet by mouth Every Night. Take 1 hour before bedtime.     No current facility-administered medications on file prior to visit.       Immunization History   Administered Date(s) Administered    COVID-19 (MODERNA) 1st,2nd,3rd Dose Monovalent 01/26/2021, 02/23/2021    Fluzone High-Dose 65+YRS 01/29/2018, 12/28/2018, 10/28/2024    Fluzone High-Dose 65+yrs 01/05/2022, 12/08/2022, 10/23/2023    Influenza TIV (IM) 10/26/2012    Influenza, Unspecified 09/30/2020    Pneumococcal Conjugate 13-Valent (PCV13) 08/11/2015    Pneumococcal Polysaccharide (PPSV23) 01/01/2006    Td (TDVAX) 10/07/2010       Review of Systems   Constitutional:  Negative for activity change, appetite change, chills, fatigue and fever.   HENT:  Negative for congestion, ear pain, rhinorrhea and sore throat.    Respiratory:  Negative  "for cough and shortness of breath.    Cardiovascular:  Negative for chest pain, palpitations and leg swelling.   Gastrointestinal:  Negative for abdominal pain, constipation, diarrhea, nausea and vomiting.   Musculoskeletal:  Negative for arthralgias and myalgias.   Neurological:  Negative for headache.        Objective     /75 (BP Location: Left arm, Patient Position: Sitting)   Pulse 87   Temp 98.5 °F (36.9 °C) (Oral)   Ht 158.1 cm (62.25\")   Wt 64.7 kg (142 lb 9.6 oz)   SpO2 100% Comment: on room air  BMI 25.87 kg/m²       Physical Exam  Vitals and nursing note reviewed.   Constitutional:       General: She is not in acute distress.     Appearance: Normal appearance.   Pulmonary:      Effort: Pulmonary effort is normal.   Musculoskeletal:      Right lower leg: No edema.      Left lower leg: No edema.   Neurological:      General: No focal deficit present.      Mental Status: She is alert.      Cranial Nerves: No cranial nerve deficit.      Coordination: Coordination normal.      Gait: Gait normal.      Comments: --LOW BACK WITH GOOD ROM,  TENDER IN LEFT PARALUMBAR AREA.  POSITIVE LEFT SLR.     Psychiatric:         Mood and Affect: Mood normal.         Behavior: Behavior normal.         Result Review :                             Assessment and Plan      Diagnoses and all orders for this visit:    1. 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  AZRA REVIEWED.  TOX SCREEN IS UP TO DATE.  THE CONTINUED USE OF A CONTROLLED SUBSTANCE IS NECESSARY FOR THIS PATIENT TO HAVE A NEAR NORMAL QUALITY OF LIFE AND WILL BE REVIEWED AT THE NEXT ROUTINE VISIT.      2. RLS (restless legs syndrome)  Assessment & Plan:  IMPROVED WITH CURRENT TREATMENT, WILL REEVALUATE AT NEXT VISIT           BMI is >= 25 and <30. (Overweight) The following options were offered after discussion;: nutrition counseling/recommendations           Follow Up     Return " in about 3 months (around 4/30/2025).    Patient was given instructions and counseling regarding her condition or for health maintenance advice. Please see specific information pulled into the AVS if appropriate.

## 2025-02-27 DIAGNOSIS — M54.42 CHRONIC LEFT-SIDED LOW BACK PAIN WITH LEFT-SIDED SCIATICA: ICD-10-CM

## 2025-02-27 DIAGNOSIS — G89.29 CHRONIC LEFT-SIDED LOW BACK PAIN WITH LEFT-SIDED SCIATICA: ICD-10-CM

## 2025-02-27 RX ORDER — HYDROCODONE BITARTRATE AND ACETAMINOPHEN 5; 325 MG/1; MG/1
1 TABLET ORAL DAILY PRN
Qty: 30 TABLET | Refills: 0 | Status: SHIPPED | OUTPATIENT
Start: 2025-02-27

## 2025-02-27 NOTE — TELEPHONE ENCOUNTER
Rx Refill Note  Requested Prescriptions     Pending Prescriptions Disp Refills    HYDROcodone-acetaminophen (NORCO) 5-325 MG per tablet 30 tablet 0     Sig: Take 1 tablet by mouth Daily As Needed for Moderate Pain.      Last office visit with prescribing clinician: 1/31/2025   Last telemedicine visit with prescribing clinician: Visit date not found   Next office visit with prescribing clinician: 5/22/2025       Nathalie Sales LPN  02/27/25, 14:19 EST    LF-1/29/25 #30, RF-0  UDS-POC Medline 12 Panel Urine Drug Screen (04/24/2024 15:13)

## 2025-02-27 NOTE — TELEPHONE ENCOUNTER
Caller: Paige Cantu    Relationship: Self    Best call back number:     [phone]       Requested Prescriptions:   Requested Prescriptions     Pending Prescriptions Disp Refills    HYDROcodone-acetaminophen (NORCO) 5-325 MG per tablet 30 tablet 0     Sig: Take 1 tablet by mouth Daily As Needed for Moderate Pain.        Pharmacy where request should be sent: [facility] [id number] - [facility] [place] - [address] [facility] - [phone]  - [phone] FX     Last office visit with prescribing clinician: 1/31/2025   Last telemedicine visit with prescribing clinician: Visit date not found   Next office visit with prescribing clinician: 5/22/2025     Does the patient have less than a 3 day supply:  [x] Yes  [] No    Would you like a call back once the refill request has been completed: [] Yes [x] No    If the office needs to give you a call back, can they leave a voicemail: [] Yes [x] No    Vianey Barth Rep   02/27/25 13:52 EST

## 2025-03-28 DIAGNOSIS — G89.29 CHRONIC LEFT-SIDED LOW BACK PAIN WITH LEFT-SIDED SCIATICA: ICD-10-CM

## 2025-03-28 DIAGNOSIS — M54.42 CHRONIC LEFT-SIDED LOW BACK PAIN WITH LEFT-SIDED SCIATICA: ICD-10-CM

## 2025-03-28 NOTE — TELEPHONE ENCOUNTER
Controlled refill request:  Requested Prescriptions     Pending Prescriptions Disp Refills    HYDROcodone-acetaminophen (NORCO) 5-325 MG per tablet 30 tablet 0     Sig: Take 1 tablet by mouth Daily As Needed for Moderate Pain.      Last OV:  1/31/2025  Next OV:  5/22/2025  Last fill:  02/27/25, #30  Last tox:  04/24/2024    Dr Dougherty is out of the office until 03/31/25, sent to on call provider Dr FAISAL Mcarthur

## 2025-03-28 NOTE — TELEPHONE ENCOUNTER
Caller: Paige Cantu    Relationship: Self    Best call back number: [phone]     Requested Prescriptions:   Requested Prescriptions     Pending Prescriptions Disp Refills    HYDROcodone-acetaminophen (NORCO) 5-325 MG per tablet 30 tablet 0     Sig: Take 1 tablet by mouth Daily As Needed for Moderate Pain.        Pharmacy where request should be sent: [facility] [id number] - [facility] [place] - [address] [facility] - [phone]  - [phone] FX     Last office visit with prescribing clinician: 1/31/2025   Last telemedicine visit with prescribing clinician: Visit date not found   Next office visit with prescribing clinician: 5/22/2025     Does the patient have less than a 3 day supply:  [x] Yes  [] No    Would you like a call back once the refill request has been completed: [] Yes [] No    If the office needs to give you a call back, can they leave a voicemail: [] Yes [] No    Vianey Stoner Rep   03/28/25 13:23 EDT

## 2025-03-30 RX ORDER — HYDROCODONE BITARTRATE AND ACETAMINOPHEN 5; 325 MG/1; MG/1
1 TABLET ORAL DAILY PRN
Qty: 30 TABLET | Refills: 0 | Status: SHIPPED | OUTPATIENT
Start: 2025-03-30

## 2025-04-23 DIAGNOSIS — I10 ESSENTIAL HYPERTENSION: ICD-10-CM

## 2025-04-23 DIAGNOSIS — G89.29 CHRONIC LEFT-SIDED LOW BACK PAIN WITH LEFT-SIDED SCIATICA: ICD-10-CM

## 2025-04-23 DIAGNOSIS — M54.42 CHRONIC LEFT-SIDED LOW BACK PAIN WITH LEFT-SIDED SCIATICA: ICD-10-CM

## 2025-04-23 RX ORDER — HYDROCHLOROTHIAZIDE 25 MG/1
25 TABLET ORAL DAILY
Qty: 90 TABLET | Refills: 3 | Status: SHIPPED | OUTPATIENT
Start: 2025-04-23

## 2025-04-23 RX ORDER — ROPINIROLE 0.5 MG/1
0.5 TABLET, FILM COATED ORAL NIGHTLY
Qty: 90 TABLET | Refills: 3 | Status: SHIPPED | OUTPATIENT
Start: 2025-04-23

## 2025-04-23 RX ORDER — LISINOPRIL 10 MG/1
10 TABLET ORAL DAILY
Qty: 90 TABLET | Refills: 3 | Status: SHIPPED | OUTPATIENT
Start: 2025-04-23

## 2025-04-23 NOTE — TELEPHONE ENCOUNTER
Caller: AlondraPaige    Relationship: Self    Best call back number: [phone]     Requested Prescriptions:   Requested Prescriptions     Pending Prescriptions Disp Refills    lisinopril (PRINIVIL,ZESTRIL) 10 MG tablet 90 tablet 3     Sig: Take 1 tablet by mouth Daily.        Pharmacy where request should be sent: [facility] - [facility] [address] - [phone]  - [phone] FX     Last office visit with prescribing clinician: 1/31/2025   Last telemedicine visit with prescribing clinician: Visit date not found   Next office visit with prescribing clinician: 5/22/2025     Additional details provided by patient: PATIENT STATES SHE SWITCHED PHARMACIES.     Does the patient have less than a 3 day supply:  [] Yes  [x] No    Vianey Ayon Rep   04/23/25 14:49 EDT

## 2025-04-29 DIAGNOSIS — G89.29 CHRONIC LEFT-SIDED LOW BACK PAIN WITH LEFT-SIDED SCIATICA: ICD-10-CM

## 2025-04-29 DIAGNOSIS — M54.42 CHRONIC LEFT-SIDED LOW BACK PAIN WITH LEFT-SIDED SCIATICA: ICD-10-CM

## 2025-04-29 NOTE — TELEPHONE ENCOUNTER
Caller: AlondraPaige    Relationship: Self    Best call back number: [phone]     Requested Prescriptions:   Requested Prescriptions     Pending Prescriptions Disp Refills    HYDROcodone-acetaminophen (NORCO) 5-325 MG per tablet 30 tablet 0     Sig: Take 1 tablet by mouth Daily As Needed for Moderate Pain.        Pharmacy where request should be sent: [facility] [id number] - [facility] [place] - [address] [facility] - [phone]  - [phone] FX     Last office visit with prescribing clinician: 1/31/2025   Last telemedicine visit with prescribing clinician: Visit date not found   Next office visit with prescribing clinician: 5/29/2025     Additional details provided by patient: PATIENT HAS 2 DOSES LEFT    Does the patient have less than a 3 day supply:  [x] Yes  [] No    Would you like a call back once the refill request has been completed: [] Yes [] No    If the office needs to give you a call back, can they leave a voicemail: [] Yes [] No    Vianey Montero Rep   04/29/25 15:40 EDT

## 2025-04-29 NOTE — TELEPHONE ENCOUNTER
Controlled refill request:  Requested Prescriptions     Pending Prescriptions Disp Refills    HYDROcodone-acetaminophen (NORCO) 5-325 MG per tablet 30 tablet 0     Sig: Take 1 tablet by mouth Daily As Needed for Moderate Pain.      Last OV:  1/31/2025  Next OV:  5/29/2025  Last fill:  03/30/25, # 30  Last tox:  02/24/24

## 2025-04-30 RX ORDER — HYDROCODONE BITARTRATE AND ACETAMINOPHEN 5; 325 MG/1; MG/1
1 TABLET ORAL DAILY PRN
Qty: 30 TABLET | Refills: 0 | Status: SHIPPED | OUTPATIENT
Start: 2025-04-30

## 2025-05-29 ENCOUNTER — OFFICE VISIT (OUTPATIENT)
Dept: FAMILY MEDICINE CLINIC | Age: 85
End: 2025-05-29
Payer: MEDICARE

## 2025-05-29 VITALS
HEART RATE: 78 BPM | SYSTOLIC BLOOD PRESSURE: 154 MMHG | TEMPERATURE: 98.6 F | HEIGHT: 62 IN | BODY MASS INDEX: 24.84 KG/M2 | WEIGHT: 135 LBS | DIASTOLIC BLOOD PRESSURE: 72 MMHG

## 2025-05-29 DIAGNOSIS — M54.42 CHRONIC LEFT-SIDED LOW BACK PAIN WITH LEFT-SIDED SCIATICA: ICD-10-CM

## 2025-05-29 DIAGNOSIS — E78.00 HIGH CHOLESTEROL: ICD-10-CM

## 2025-05-29 DIAGNOSIS — K59.09 CHRONIC CONSTIPATION: ICD-10-CM

## 2025-05-29 DIAGNOSIS — I10 ESSENTIAL HYPERTENSION: ICD-10-CM

## 2025-05-29 DIAGNOSIS — G89.29 CHRONIC LEFT-SIDED LOW BACK PAIN WITH LEFT-SIDED SCIATICA: ICD-10-CM

## 2025-05-29 DIAGNOSIS — Z00.00 MEDICARE ANNUAL WELLNESS VISIT, SUBSEQUENT: Primary | ICD-10-CM

## 2025-05-29 RX ORDER — HYDROCODONE BITARTRATE AND ACETAMINOPHEN 5; 325 MG/1; MG/1
1 TABLET ORAL DAILY PRN
Qty: 30 TABLET | Refills: 0 | Status: SHIPPED | OUTPATIENT
Start: 2025-05-29

## 2025-05-29 NOTE — ASSESSMENT & PLAN NOTE
Orders:    HYDROcodone-acetaminophen (NORCO) 5-325 MG per tablet; Take 1 tablet by mouth Daily As Needed for Moderate Pain.

## 2025-05-29 NOTE — PROGRESS NOTES
Subjective   The ABCs of the Annual Wellness Visit  Medicare Wellness Visit      Paige Cantu is a 84 y.o. patient who presents for a Medicare Wellness Visit.    The following portions of the patient's history were reviewed and   updated as appropriate: allergies, current medications, past family history, past medical history, past social history, past surgical history, and problem list.    Compared to one year ago, the patient's physical   health is the same.  Compared to one year ago, the patient's mental   health is the same.    Recent Hospitalizations:  She was not admitted to the hospital during the last year.     Current Medical Providers:  Patient Care Team:  Reza Dougherty MD as PCP - General (Family Medicine)    Outpatient Medications Prior to Visit   Medication Sig Dispense Refill    atorvastatin (LIPITOR) 20 MG tablet Take 1 tablet by mouth Daily. 90 tablet 3    hydroCHLOROthiazide 25 MG tablet TAKE 1 TABLET EVERY DAY 90 tablet 3    lisinopril (PRINIVIL,ZESTRIL) 10 MG tablet Take 1 tablet by mouth Daily. 90 tablet 3    rOPINIRole (REQUIP) 0.5 MG tablet TAKE 1 TABLET BY MOUTH EVERY NIGHT. TAKE 1 HOUR BEFORE BEDTIME. 90 tablet 3    anastrozole (ARIMIDEX) 1 MG tablet       HYDROcodone-acetaminophen (NORCO) 5-325 MG per tablet Take 1 tablet by mouth Daily As Needed for Moderate Pain. 30 tablet 0    meloxicam (MOBIC) 15 MG tablet Take 1 tablet by mouth Daily.       No facility-administered medications prior to visit.     Opioid medication/s are on active medication list.  and I have evaluated her active treatment plan and pain score trends (see table).  Vitals:    05/29/25 1427   PainSc: 0-No pain     I have reviewed the chart for potential of high risk medication and harmful drug interactions in the elderly.        Aspirin is not on active medication list.  Aspirin use is not indicated based on review of current medical condition/s. Risk of harm outweighs potential benefits.  .    Patient Active  "Problem List   Diagnosis    Chronic left-sided low back pain with left-sided sciatica    High cholesterol    Essential hypertension    History of breast cancer    Medicare annual wellness visit, subsequent    Stage 3a chronic kidney disease    RLS (restless legs syndrome)    Chronic constipation     Advance Care Planning Advance Directive is not on file.  ACP discussion was held with the patient during this visit. Patient has an advance directive (not in EMR), copy requested.            Objective   Vitals:    25 1427   BP: 154/72   BP Location: Left arm   Patient Position: Sitting   Pulse: 78   Temp: 98.6 °F (37 °C)   TempSrc: Oral   Weight: 61.2 kg (135 lb)   Height: 158.1 cm (62.25\")   PainSc: 0-No pain       Estimated body mass index is 24.49 kg/m² as calculated from the following:    Height as of this encounter: 158.1 cm (62.25\").    Weight as of this encounter: 61.2 kg (135 lb).    BMI is within normal parameters. No other follow-up for BMI required.           Does the patient have evidence of cognitive impairment? No                                                                                                Health  Risk Assessment    Smoking Status:  Social History     Tobacco Use   Smoking Status Never    Passive exposure: Never   Smokeless Tobacco Never     Alcohol Consumption:  Social History     Substance and Sexual Activity   Alcohol Use None       Fall Risk Screen  STEADI Fall Risk Assessment was completed, and patient is at LOW risk for falls.Assessment completed on:2025    Depression Screening   Little interest or pleasure in doing things? Not at all   Feeling down, depressed, or hopeless? Several days   PHQ-2 Total Score 1      Health Habits and Functional and Cognitive Screenin/29/2025     2:31 PM   Functional & Cognitive Status   Do you have difficulty preparing food and eating? No   Do you have difficulty bathing yourself, getting dressed or grooming yourself? No   Do you " have difficulty using the toilet? No   Do you have difficulty moving around from place to place? No   Do you have trouble with steps or getting out of a bed or a chair? No   Current Diet Well Balanced Diet   Dental Exam Not up to date   Eye Exam Up to date   Exercise (times per week) 0 times per week   Current Exercises Include No Regular Exercise   Do you need help using the phone?  No   Are you deaf or do you have serious difficulty hearing?  No   Do you need help to go to places out of walking distance? No   Do you need help shopping? No   Do you need help preparing meals?  No   Do you need help with housework?  No   Do you need help with laundry? No   Do you need help taking your medications? No   Do you need help managing money? No   Do you ever drive or ride in a car without wearing a seat belt? No   Have you felt unusual stress, anger or loneliness in the last month? No   Who do you live with? Alone   If you need help, do you have trouble finding someone available to you? No   Have you been bothered in the last four weeks by sexual problems? No   Do you have difficulty concentrating, remembering or making decisions? No           Age-appropriate Screening Schedule:  Refer to the list below for future screening recommendations based on patient's age, sex and/or medical conditions. Orders for these recommended tests are listed in the plan section. The patient has been provided with a written plan.    Health Maintenance List  Health Maintenance   Topic Date Due    ZOSTER VACCINE (1 of 2) Never done    RSV Vaccine - Adults (1 - 1-dose 75+ series) Never done    TDAP/TD VACCINES (2 - Tdap) 10/07/2020    COVID-19 Vaccine (3 - 2024-25 season) 09/01/2024    INFLUENZA VACCINE  07/01/2025    LIPID PANEL  10/28/2025    ANNUAL WELLNESS VISIT  05/29/2026    DXA SCAN  03/13/2027    Pneumococcal Vaccine 50+  Completed                                                                                                                "                                 CMS Preventative Services Quick Reference  Risk Factors Identified During Encounter  None Identified    The above risks/problems have been discussed with the patient.  Pertinent information has been shared with the patient in the After Visit Summary.  An After Visit Summary and PPPS were made available to the patient.    Follow Up:   Next Medicare Wellness visit to be scheduled in 1 year.         Additional E&M Note during same encounter follows:  Patient has additional, significant, and separately identifiable condition(s)/problem(s) that require work above and beyond the Medicare Wellness Visit     Chief Complaint  Medicare Wellness-subsequent and Constipation (Requesting linzess)    Subjective   --TOLERATING BLOOD PRESSURE MEDICATION WITHOUT APPARENT SIDE EFFECTS  --LAST LIPIDS WERE OK, NO ISSUES WITH THE STATIN  --HAD A HARD B.M. EVERY TWO TO THREE DAYS.  NORMAL COLONOSCOPY IN 2019.  HAS ONLY TAKEN AN OCCASIONAL STOOL SOFTNER.            Review of Systems   Constitutional:  Negative for activity change, appetite change, chills, fatigue and fever.   HENT:  Negative for congestion, ear pain, hearing loss, rhinorrhea and sore throat.    Eyes:  Negative for discharge and visual disturbance.   Respiratory:  Negative for cough and shortness of breath.    Cardiovascular:  Negative for chest pain, palpitations and leg swelling.   Gastrointestinal:  Negative for abdominal pain, diarrhea, nausea and vomiting.   Genitourinary:  Negative for dysuria and hematuria.   Musculoskeletal:  Negative for arthralgias and myalgias.   Psychiatric/Behavioral:  Negative for dysphoric mood.               Objective   Vital Signs:  /72 (BP Location: Left arm, Patient Position: Sitting)   Pulse 78   Temp 98.6 °F (37 °C) (Oral)   Ht 158.1 cm (62.25\")   Wt 61.2 kg (135 lb)   BMI 24.49 kg/m²   Physical Exam  Vitals and nursing note reviewed.   Constitutional:       General: She is not in acute " distress.     Appearance: Normal appearance.   HENT:      Right Ear: Tympanic membrane normal.      Left Ear: Tympanic membrane normal.      Mouth/Throat:      Pharynx: Oropharynx is clear.   Eyes:      Conjunctiva/sclera: Conjunctivae normal.   Cardiovascular:      Rate and Rhythm: Normal rate and regular rhythm.      Heart sounds: Normal heart sounds. No murmur heard.  Pulmonary:      Effort: Pulmonary effort is normal.      Breath sounds: Normal breath sounds.   Abdominal:      General: Bowel sounds are normal.      Palpations: Abdomen is soft.      Tenderness: There is no abdominal tenderness.   Musculoskeletal:      Cervical back: Neck supple.      Right lower leg: No edema.      Left lower leg: No edema.   Lymphadenopathy:      Cervical: No cervical adenopathy.   Neurological:      General: No focal deficit present.      Mental Status: She is alert.      Cranial Nerves: No cranial nerve deficit.      Coordination: Coordination normal.      Gait: Gait normal.   Psychiatric:         Mood and Affect: Mood normal.         Behavior: Behavior normal.                    Assessment and Plan      Chronic left-sided low back pain with left-sided sciatica    Orders:    HYDROcodone-acetaminophen (NORCO) 5-325 MG per tablet; Take 1 tablet by mouth Daily As Needed for Moderate Pain.    Essential hypertension  Hypertension is stable and controlled  Continue current treatment regimen.  Blood pressure will be reassessed in 6 months.         High cholesterol   Lipid abnormalities are stable    Plan:  Continue same medication/s without change.      Discussed medication dosage, use, side effects, and goals of treatment in detail.    Counseled patient on lifestyle modifications to help control hyperlipidemia.     Patient Treatment Goals:   LDL goal is less than 70    Followup in 6 months.         Medicare annual wellness visit, subsequent  ADVICE GIVEN RE:  SEATBELT USE, ALCOHOL USE, HEALTHY DIET, ROUTINE EYE AND DENTAL EXAM,  ROUTINE VACCINATIONS.           Chronic constipation  ADVICE GIVEN RE:  FLUIDS, EXERCISE, FIBER INTAKE, OTC MIRALAX.  CONSIDER FURTER WORKUP OR RX MEDS.                   Follow Up   Return in about 3 months (around 8/29/2025).  Patient was given instructions and counseling regarding her condition or for health maintenance advice. Please see specific information pulled into the AVS if appropriate.

## 2025-05-29 NOTE — ASSESSMENT & PLAN NOTE
ADVICE GIVEN RE:  FLUIDS, EXERCISE, FIBER INTAKE, OTC MIRALAX.  CONSIDER FURTER WORKUP OR RX MEDS.

## 2025-06-02 DIAGNOSIS — G89.29 CHRONIC LEFT-SIDED LOW BACK PAIN WITH LEFT-SIDED SCIATICA: ICD-10-CM

## 2025-06-02 DIAGNOSIS — M54.42 CHRONIC LEFT-SIDED LOW BACK PAIN WITH LEFT-SIDED SCIATICA: ICD-10-CM

## 2025-06-02 NOTE — TELEPHONE ENCOUNTER
Controlled refill request:  Requested Prescriptions     Pending Prescriptions Disp Refills    HYDROcodone-acetaminophen (NORCO) 5-325 MG per tablet 30 tablet 0     Sig: Take 1 tablet by mouth Daily As Needed for Moderate Pain.      Last OV:  5/29/2025  Next OV:  8/29/2025  Last fill:  05/29/25, #30, sent to [facility] mail order.  Pt needing it go to local [facility].   Last tox:  04/24/2024

## 2025-06-02 NOTE — TELEPHONE ENCOUNTER
Caller: Paige Cantu    Relationship: Self    Best call back number: [phone]    Requested Prescriptions:   Requested Prescriptions     Pending Prescriptions Disp Refills    HYDROcodone-acetaminophen (NORCO) 5-325 MG per tablet 30 tablet 0     Sig: Take 1 tablet by mouth Daily As Needed for Moderate Pain.        Pharmacy where request should be sent: [facility] [id number] - [place], [place] - [address] [facility] - [phone]  - [phone] FX     Last office visit with prescribing clinician: 5/29/2025   Last telemedicine visit with prescribing clinician: Visit date not found   Next office visit with prescribing clinician: 8/29/2025       Does the patient have less than a 3 day supply:  [x] Yes  [] No    Would you like a call back once the refill request has been completed: [] Yes [x] No    If the office needs to give you a call back, can they leave a voicemail: [] Yes [x] No    Vianey Chan   06/02/25 14:44 EDT

## 2025-06-03 RX ORDER — HYDROCODONE BITARTRATE AND ACETAMINOPHEN 5; 325 MG/1; MG/1
1 TABLET ORAL DAILY PRN
Qty: 30 TABLET | Refills: 0 | Status: SHIPPED | OUTPATIENT
Start: 2025-06-03

## 2025-07-07 DIAGNOSIS — G89.29 CHRONIC LEFT-SIDED LOW BACK PAIN WITH LEFT-SIDED SCIATICA: ICD-10-CM

## 2025-07-07 DIAGNOSIS — M54.42 CHRONIC LEFT-SIDED LOW BACK PAIN WITH LEFT-SIDED SCIATICA: ICD-10-CM

## 2025-07-07 RX ORDER — HYDROCODONE BITARTRATE AND ACETAMINOPHEN 5; 325 MG/1; MG/1
1 TABLET ORAL DAILY PRN
Qty: 14 TABLET | Refills: 0 | Status: SHIPPED | OUTPATIENT
Start: 2025-07-07

## 2025-07-07 NOTE — TELEPHONE ENCOUNTER
Caller: Paige Cantu    Relationship: Self    Best call back number: [phone]     Requested Prescriptions:   Requested Prescriptions     Pending Prescriptions Disp Refills    HYDROcodone-acetaminophen (NORCO) 5-325 MG per tablet 30 tablet 0     Sig: Take 1 tablet by mouth Daily As Needed for Moderate Pain.        Pharmacy where request should be sent: [facility] [id number] [facility] [place] - [address] [facility] - [phone]  - [phone] FX     Last office visit with prescribing clinician: 5/29/2025   Last telemedicine visit with prescribing clinician: Visit date not found   Next office visit with prescribing clinician: 9/5/2025     Additional details provided by patient:     Does the patient have less than a 3 day supply:  [x] Yes  [] No      Gale Silva, PCT   07/07/25 14:09 EDT

## 2025-07-07 NOTE — TELEPHONE ENCOUNTER
Controlled refill request:  Requested Prescriptions     Pending Prescriptions Disp Refills    HYDROcodone-acetaminophen (NORCO) 5-325 MG per tablet 30 tablet 0     Sig: Take 1 tablet by mouth Daily As Needed for Moderate Pain.      Last OV:  5/29/2025  Next OV:  9/5/2025  Last fill:  06/03/2025, #30  Last tox:  04/24/2024    Dr Dougherty is out of the office until 07/14/25, sent to on call provider Dr Hernandez.

## 2025-07-15 ENCOUNTER — HOSPITAL ENCOUNTER (OUTPATIENT)
Dept: GENERAL RADIOLOGY | Facility: HOSPITAL | Age: 85
Discharge: HOME | End: 2025-07-15
Admitting: STUDENT IN AN ORGANIZED HEALTH CARE EDUCATION/TRAINING PROGRAM
Payer: MEDICARE

## 2025-07-15 ENCOUNTER — OFFICE VISIT (OUTPATIENT)
Dept: FAMILY MEDICINE CLINIC | Age: 85
End: 2025-07-15
Payer: MEDICARE

## 2025-07-15 VITALS
DIASTOLIC BLOOD PRESSURE: 74 MMHG | TEMPERATURE: 98.4 F | HEART RATE: 87 BPM | OXYGEN SATURATION: 97 % | BODY MASS INDEX: 23.48 KG/M2 | WEIGHT: 127.6 LBS | SYSTOLIC BLOOD PRESSURE: 132 MMHG | HEIGHT: 62 IN

## 2025-07-15 DIAGNOSIS — M25.512 ACUTE PAIN OF LEFT SHOULDER: Primary | ICD-10-CM

## 2025-07-15 DIAGNOSIS — I10 ESSENTIAL HYPERTENSION: ICD-10-CM

## 2025-07-15 DIAGNOSIS — M25.512 ACUTE PAIN OF LEFT SHOULDER: ICD-10-CM

## 2025-07-15 PROCEDURE — 73030 X-RAY EXAM OF SHOULDER: CPT

## 2025-07-15 RX ORDER — METHOCARBAMOL 500 MG/1
250 TABLET, FILM COATED ORAL NIGHTLY PRN
Qty: 15 TABLET | Refills: 0 | Status: SHIPPED | OUTPATIENT
Start: 2025-07-15

## 2025-07-15 NOTE — PROGRESS NOTES
Chief Complaint     Fall (Patient tripped over cat, has pain and bruising to left shoulder)    History of Present Illness     Paige Cantu is a 84 y.o. female who presents to [facility].     Patient or patient representative verbalized consent for the use of Ambient Listening during the visit with  MARQUITA Phan for chart documentation. 7/17/2025  14:07 EDT      History of Present Illness  The patient is an 84-year-old female who presents for evaluation of left shoulder pain.    She reports a burning sensation in her left shoulder, which she attributes to a fall that occurred late at night on 07/11/2025. The incident involved tripping over her cat and landing on a large chair with substantial legs. She experiences difficulty in dressing herself and moving her arm across her body, and is unable to carry objects. The pain extends down her arm and up her neck, behind her ear. She also reports a slight headache, which is unusual for her. She has not tried Tylenol in conjunction with hydrocodone. She has attempted to alleviate the pain with hydrocodone, a medication she typically uses for back pain. Her son provided her with a 10 mg dose of hydrocodone, as she only had 5 mg doses at home. She has also tried using a lidocaine patch, which provided some relief. She has not used any muscle relaxers in the past.    Blood pressure elevated today at 171/79 initially, rechecked prior to leaving the office it was 132/74. She continues to take lisinopril and hydrochlorothiazide for blood pressure management.         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 No pertinent family history.     Current Medications        Current Outpatient Medications:     atorvastatin (LIPITOR) 20 MG tablet, Take 1 tablet by mouth Daily., Disp: 90 tablet, Rfl: 3    hydroCHLOROthiazide 25 MG tablet, TAKE 1 TABLET EVERY DAY, Disp: 90 tablet, Rfl: 3    HYDROcodone-acetaminophen (NORCO) 5-325 MG per tablet, Take 1 tablet by mouth Daily As Needed for Moderate Pain., Disp: 14 tablet, Rfl: 0    lisinopril (PRINIVIL,ZESTRIL) 10 MG tablet, Take 1 tablet by mouth Daily., Disp: 90 tablet, Rfl: 3    rOPINIRole (REQUIP) 0.5 MG tablet, TAKE 1 TABLET BY MOUTH EVERY NIGHT. TAKE 1 HOUR BEFORE BEDTIME., Disp: 90 tablet, Rfl: 3    methocarbamol (ROBAXIN) 500 MG tablet, Take 0.5 tablets by mouth At Night As Needed for Muscle Spasms., Disp: 15 tablet, Rfl: 0     Allergies     No Known Allergies    Social History       Social History     Social History Narrative    Not on file       Immunizations     Immunization:  Immunization History   Administered Date(s) Administered    COVID-19 (MODERNA) 1st,2nd,3rd Dose Monovalent 01/26/2021, 02/23/2021    Fluzone High-Dose 65+YRS 01/29/2018, 12/28/2018, 10/28/2024    Fluzone High-Dose 65+yrs 01/05/2022, 12/08/2022, 10/23/2023    Influenza TIV (IM) 10/26/2012    Influenza, Unspecified 09/30/2020    Pneumococcal Conjugate 13-Valent (PCV13) 08/11/2015    Pneumococcal Polysaccharide (PPSV23) 01/01/2006    Td (TDVAX) 10/07/2010          Objective     Objective     Vital Signs:   /74 (BP Location: Right arm, Patient Position: Sitting, Cuff Size: Adult) Comment (BP Location): manual  Pulse 87   Temp 98.4 °F (36.9 °C) (Oral)   Ht 158.1 cm (62.25\")   Wt 57.9 kg (127 lb 9.6 oz)   SpO2 97% Comment: on RA  BMI 23.15 kg/m²       Physical Exam  Vitals and nursing note reviewed.   Constitutional:       Appearance: Normal appearance. She is normal weight.   HENT:      Head: " Normocephalic.   Eyes:      Conjunctiva/sclera: Conjunctivae normal.      Pupils: Pupils are equal, round, and reactive to light.   Cardiovascular:      Rate and Rhythm: Normal rate and regular rhythm.      Pulses: Normal pulses.      Heart sounds: Normal heart sounds.   Pulmonary:      Effort: Pulmonary effort is normal.      Breath sounds: Normal breath sounds.   Abdominal:      General: Bowel sounds are normal.      Palpations: Abdomen is soft.   Musculoskeletal:         General: Normal range of motion.      Left shoulder: Tenderness present.      Cervical back: Normal range of motion and neck supple.      Comments: Ecchymosis on the left shoulder along with tenderness   Skin:     General: Skin is warm and dry.   Neurological:      General: No focal deficit present.      Mental Status: She is alert and oriented to person, place, and time.   Psychiatric:         Attention and Perception: Attention normal.         Mood and Affect: Mood and affect normal.         Behavior: Behavior normal. Behavior is cooperative.         Physical Exam        Results    The following data was reviewed by: MARQUITA Phan on 07/17/25             XR Shoulder 2+ View Left (07/15/2025 14:51)   Results           Assessment and Plan        Assessment and Plan       Acute pain of left shoulder    Orders:    XR Shoulder 2+ View Left; Future    methocarbamol (ROBAXIN) 500 MG tablet; Take 0.5 tablets by mouth At Night As Needed for Muscle Spasms.    Essential hypertension    Blood pressure elevated today at 171/79 initially, rechecked prior to leaving the office it was 132/74.  Continue taking lisinopril and hydrochlorothiazide as directed.  Monitor blood pressure at home and follow-up with PCP.            Assessment & Plan  1. Left shoulder pain.  - Reports left shoulder pain with a burning sensation following a fall on 07/11/2025.  - Pain is exacerbated by movement and prevents sleeping on the affected side.  - An x-ray of the left  shoulder will be ordered to assess for fractures or other issues.  - Continue using lidocaine patches, alternating heat and cold therapy. Methocarbamol, half a tablet at bedtime, will be prescribed for muscle relaxation and pain management.    2. Elevated blood pressure.  - Blood pressure noted to be high during the visit.  - Currently taking lisinopril and hydrochlorothiazide.  - Blood pressure will be rechecked during this visit.  - Continue current medications and monitor blood pressure.        Follow Up        Follow Up   Return for With PCP, Next scheduled follow up, sooner if condition worsens.  Patient was given instructions and counseling regarding her condition or for health maintenance advice. Please see specific information pulled into the AVS if appropriate.

## 2025-07-17 NOTE — ASSESSMENT & PLAN NOTE
{Hypertension is (optional):[id number]}  Blood pressure elevated today at 171/79 initially, rechecked prior to leaving the office it was 132/74.  Continue taking lisinopril and hydrochlorothiazide as directed.  Monitor blood pressure at home and follow-up with PCP.

## 2025-08-05 DIAGNOSIS — G89.29 CHRONIC LEFT-SIDED LOW BACK PAIN WITH LEFT-SIDED SCIATICA: ICD-10-CM

## 2025-08-05 DIAGNOSIS — M54.42 CHRONIC LEFT-SIDED LOW BACK PAIN WITH LEFT-SIDED SCIATICA: ICD-10-CM

## 2025-08-06 RX ORDER — HYDROCODONE BITARTRATE AND ACETAMINOPHEN 5; 325 MG/1; MG/1
1 TABLET ORAL DAILY PRN
Qty: 30 TABLET | Refills: 0 | Status: SHIPPED | OUTPATIENT
Start: 2025-08-06